# Patient Record
Sex: FEMALE | Race: WHITE | NOT HISPANIC OR LATINO | Employment: OTHER | URBAN - METROPOLITAN AREA
[De-identification: names, ages, dates, MRNs, and addresses within clinical notes are randomized per-mention and may not be internally consistent; named-entity substitution may affect disease eponyms.]

---

## 2017-01-23 ENCOUNTER — CONSULT (OUTPATIENT)
Dept: CARDIOLOGY | Facility: CLINIC | Age: 67
End: 2017-01-23

## 2017-01-23 VITALS
SYSTOLIC BLOOD PRESSURE: 134 MMHG | DIASTOLIC BLOOD PRESSURE: 88 MMHG | BODY MASS INDEX: 36.67 KG/M2 | HEIGHT: 64 IN | HEART RATE: 112 BPM | WEIGHT: 214.8 LBS

## 2017-01-23 DIAGNOSIS — I48.19 PERSISTENT ATRIAL FIBRILLATION (HCC): Primary | ICD-10-CM

## 2017-01-23 PROCEDURE — 93000 ELECTROCARDIOGRAM COMPLETE: CPT | Performed by: INTERNAL MEDICINE

## 2017-01-23 PROCEDURE — 99204 OFFICE O/P NEW MOD 45 MIN: CPT | Performed by: INTERNAL MEDICINE

## 2017-01-23 NOTE — MR AVS SNAPSHOT
Chelsey Herrera   1/23/2017 9:00 AM   Consult    Dept Phone:  [phone]   Encounter #:  [id number]    Provider:  Miguelito Owusu DO   Department:  [facility] [place] CARDIOLOGY                Your Full Care Plan              Today's Medication Changes          These changes are accurate as of: 1/23/17 10:11 AM.  If you have any questions, ask your nurse or doctor.               Stop taking medication(s)listed here:     flecainide 100 MG tablet   Commonly known as:  TAMBOCOR   Stopped by:  Miguelito Owusu DO                      Your Updated Medication List          This list is accurate as of: 1/23/17 10:11 AM.  Always use your most recent med list.                apixaban 5 MG tablet tablet   Commonly known as:  ELIQUIS       B-12 (METHYLCOBALAMIN) SL       cholecalciferol 1000 UNITS tablet   Commonly known as:  VITAMIN D3       cyclobenzaprine 10 MG tablet   Commonly known as:  FLEXERIL       ferrous sulfate 325 (65 FE) MG tablet       furosemide 40 MG tablet   Commonly known as:  LASIX       HYDROcodone-acetaminophen  MG per tablet   Commonly known as:  NORCO       MULTI VITAMIN PO       nebivolol 10 MG tablet   Commonly known as:  BYSTOLIC   Take 1 tablet by mouth 2 (Two) Times a Day.       potassium chloride 20 MEQ CR tablet   Commonly known as:  K-DUR,KLOR-CON               You Were Diagnosed With        Codes Comments    Persistent atrial fibrillation    -  Primary ICD-10-CM: I48.1  ICD-9-CM: 427.31       Instructions     None    Patient Instructions History      Upcoming Appointments     Visit Type Date Time Department    CONSULT 1/23/2017  9:00 AM [facility]      MyChart Signup     Our records indicate that you have declined [facility] MyChart signup. If you would like to sign up for [employer], please email [email or web address] or call [phone] to obtain an activation code.             Other Info from Your Visit            "  Allergies     Accupril [Quinapril Hcl]  Cough      Vital Signs     Blood Pressure Pulse Height Weight Last Menstrual Period Body Mass Index    134/88 (BP Location: Left arm, Patient Position: Sitting) 112 64\" (162.6 cm) 214 lb 12.8 oz (97.4 kg) (LMP Unknown) 36.87 kg/m2    Smoking Status                   Never Smoker           Problems and Diagnoses Noted     Atrial fibrillation (irregular heartbeat)        "

## 2017-01-23 NOTE — PROGRESS NOTES
Chelsey Herrera  1950  [phone]      01/23/2017    [facility] CARDIOLOGY    Everardo Mccormack MD  [address] [place] [id number]    REFERRING DOCTOR : Jeremiah Sinclair MD         Patient ID: Chelsey Herrera is a 66 y.o. female.    Chief Complaint: afib w/ RVR       Allergies   Allergen Reactions   • Accupril [Quinapril Hcl] Cough       Current Outpatient Prescriptions:   •  apixaban (ELIQUIS) 5 MG tablet tablet, Take 5 mg by mouth 2 (Two) Times a Day., Disp: , Rfl:   •  B-12, METHYLCOBALAMIN, SL, Place 2,500 mg under the tongue Daily., Disp: , Rfl:   •  cholecalciferol (VITAMIN D3) 1000 UNITS tablet, Take 1,000 Units by mouth Daily., Disp: , Rfl:   •  cyclobenzaprine (FLEXERIL) 10 MG tablet, Take 10 mg by mouth At Night As Needed for muscle spasms., Disp: , Rfl:   •  ferrous sulfate 325 (65 FE) MG tablet, Take 325 mg by mouth Daily With Breakfast., Disp: , Rfl:   •  furosemide (LASIX) 40 MG tablet, Take 40 mg by mouth Daily As Needed., Disp: , Rfl:   •  HYDROcodone-acetaminophen (NORCO)  MG per tablet, Take 1 tablet by mouth Every 6 (Six) Hours As Needed for moderate pain (4-6)., Disp: , Rfl:   •  Multiple Vitamin (MULTI VITAMIN PO), Take 1 tablet by mouth Daily., Disp: , Rfl:   •  nebivolol (BYSTOLIC) 10 MG tablet, Take 1 tablet by mouth 2 (Two) Times a Day., Disp: 30 tablet, Rfl: 12  •  potassium chloride (K-DUR,KLOR-CON) 20 MEQ CR tablet, Take 20 mEq by mouth Daily., Disp: , Rfl:     History of Present Illness     Patient presents today for consultation referred by Migel Sinclair MD regarding atrial fibrillation and failed medications. She was diagnosed about one year ago by PCP when she went in for extreme fatigue. Her main symptoms are fatigue, SOA, and palpitations. She was initially tried on Sotalol with ECV and only maintained sinus for a few minutes and a second cardioversion couple weeks later which then resulted in normal rhythm however  lasted only about 24 hours.. She then tried Flecainide with subsequent ECV and maintained sinus for a  She tells me that she had TTE which was reportedly normal and after talking to Dr Sherwood office EF 65%. She has had normal stress test. No issues with CP, PND, orthopnea, dizziness, syncope.     The following portions of the patient's history were reviewed and updated as appropriate: allergies, current medications, past family history, past medical history, past social history, past surgical history and problem list.    Past Medical History   Diagnosis Date   • Arrhythmia    • Arthritis    • Atrial fibrillation    • Bilateral kidney stones    • Cough    • Hypertension    • Muscle weakness (generalized)    • Palpitations    • SOB (shortness of breath)    • Urination frequency          Past Surgical History   Procedure Laterality Date   • Replacement total knee bilateral     • Carpal tunnel release     • Sinus surgery     • Bariatric surgery     • Cardioversion     • Cardiac catheterization         Social History     Social History   • Marital status:      Spouse name: N/A   • Number of children: N/A   • Years of education: N/A     Occupational History   • Not on file.     Social History Main Topics   • Smoking status: Never Smoker   • Smokeless tobacco: Never Used   • Alcohol use No   • Drug use: No   • Sexual activity: Defer     Other Topics Concern   • Not on file     Social History Narrative     Family History   Problem Relation Age of Onset   • Heart attack Mother    • Heart attack Father    • Heart disease Brother    • Diabetes Brother    • Kidney disease Brother          REVIEW OF SYSTEMS:   CONSTITUTIONAL: No weight loss, fever, chills. + fatigue  HEENT: Eyes: No visual loss, blurred vision, double vision or yellow sclerae. Ears, Nose, Throat: No hearing loss, sneezing, congestion, runny nose or sore throat.   SKIN: No rash or itching.     RESPIRATORY: No hemoptysis, cough or sputum. + SOB  "noted.  GASTROINTESTINAL: No anorexia, nausea, vomiting or diarrhea. No abdominal pain, bright red blood per rectum or melena.  GENITOURINARY: No burning on urination, hematuria or increased frequency.  NEUROLOGICAL: No headache, dizziness, syncope, paralysis, ataxia, numbness or tingling in the extremities. No change in bowel or bladder control.   MUSCULOSKELETAL: No muscle, back pain, joint pain or stiffness.   HEMATOLOGIC: No anemia, bleeding or bruising.   LYMPHATICS: No enlarged nodes. No history of splenectomy.   PSYCHIATRIC: No history of depression, anxiety, hallucinations.   ENDOCRINOLOGIC: No reports of sweating, cold or heat intolerance. No polyuria or polydipsia.   Ext: No edema or bruising      The patient's old records including ambulatory rhythm recordings (ECGs, Holter/event monitor) were reviewed and discussed.           Objective:       Vitals:    01/23/17 0917   BP: 134/88   BP Location: Left arm   Patient Position: Sitting   Pulse: 112   Weight: 214 lb 12.8 oz (97.4 kg)   Height: 64\" (162.6 cm)       Constitutional: oriented to person, place, and time.  well-developed and well-nourished. No distress.   HENT: Normocephalic.   Eyes: Conjunctivae are normal. No scleral icterus.   Neck: Normal carotid pulses, no hepatojugular reflux and no JVD present. Carotid bruit is not present. No tracheal deviation, no edema and no erythema present. No thyromegaly present.   Cardiovascular: Irregularly irregular tachy, S1 normal, S2 normal, normal heart sounds and intact distal pulses.   No extrasystoles are present. PMI is not displaced.  Exam reveals no gallop, no distant heart sounds and no friction rub.    No murmur heard.  Pulses:       Radial pulses are 2+ on the right side, and 2+ on the left side.       Dorsalis pedis pulses are 2+ on the right side, and 2+ on the left side.   Pulmonary/Chest: Effort normal and breath sounds normal. No respiratory distress. She has no decreased breath sounds.  no " wheezes,  Rhonchi or rales.  no tenderness.   Abdominal: Soft. Bowel sounds are normal. She exhibits no distension and no mass. There is no hepatosplenomegaly. There is no tenderness. There is no rebound and no guarding.   Musculoskeletal:  Trace edema, tenderness or deformity.   Neurological: is alert and oriented to person, place, and time.   Skin: Skin is warm and dry. No rash noted. No diaphoretic. No cyanosis or erythema. No pallor. Nails show no clubbing.   Psychiatric: Normal mood and affect.Speech is normal and behavior is normal.    Lab Review:   Results for orders placed or performed during the hospital encounter of 11/29/16   CBC (No Diff)   Result Value Ref Range    WBC 9.41 3.50 - 10.80 10*3/mm3    RBC 4.98 3.89 - 5.14 10*6/mm3    Hemoglobin 13.9 11.5 - 15.5 g/dL    Hematocrit 43.4 34.5 - 44.0 %    MCV 87.1 80.0 - 99.0 fL    MCH 27.9 27.0 - 31.0 pg    MCHC 32.0 32.0 - 36.0 g/dL    RDW 15.0 (H) 11.3 - 14.5 %    RDW-SD 48.0 37.0 - 54.0 fl    MPV 10.5 6.0 - 12.0 fL    Platelets 277 150 - 450 10*3/mm3   Basic Metabolic Panel   Result Value Ref Range    Glucose 109 (H) 70 - 100 mg/dL    BUN 20 9 - 23 mg/dL    Creatinine 0.80 0.60 - 1.30 mg/dL    Sodium 141 132 - 146 mmol/L    Potassium 4.5 3.5 - 5.5 mmol/L    Chloride 103 99 - 109 mmol/L    CO2 30.0 20.0 - 31.0 mmol/L    Calcium 10.4 8.7 - 10.4 mg/dL    eGFR Non African Amer 71 >60 mL/min/1.73    BUN/Creatinine Ratio 25.0 7.0 - 25.0    Anion Gap 8.0 3.0 - 11.0 mmol/L         ECG 12 Lead  Date/Time: 1/23/2017 10:19 AM  Performed by: NOY MIJARES  Authorized by: NOY MIJARES   Rhythm: atrial fibrillation  Comments: Afib with RVR                Diagnosis:   1. Persistent atrial fibrillation      Assessment & Plan:     1. Symptomatic Persistent Atrial Fibrillation w/ failure of Sotalol, Flecainide, and ECV x 3 with Early Recurrence of Afib. CHADS VASC 2 with age and female. Will plan for PVA in near future per patients preference since she kareen failed to  drugs and continues to feels very symptomatic with the atrial fibrillation with shortness of breath and fatigue like she's never had. CHADS-VASC=2, on Eliquis 5mg BID. Currently on Bystolic 10mg BID. Will add Cardizem 120mg daily for better rate control and will send a EKG next week to look at rates. The risks, benefits, and alternatives of the procedure have been reviewed and the patient wishes to proceed. Normal EF and negative stress for ischemia in the past.         Scribed for Miguelito Owusu DO by Merline Nieto PA-C. 1/23/2017  9:49 AM    I, Miguelito Owusu DO, personally performed the services described in this documentation as scribed by the above named individual in my presence, and it is both accurate and complete.  1/23/2017  10:22 AM    Miguelito Owusu DO  10:22 AM  01/23/17        CC: MD Merline Bazzi PA  01/23/17  9:49 AM      EMR Dragon/Transcription disclaimer:  Much of this encounter note is an electronic transcription/translation of spoken language to printed text. Electronic translation of spoken language may permit erroneous, or at times, nonsensical words or phrases to be inadvertently transcribed. Although I have reviewed the note for such errors, some may still exist.

## 2017-01-30 DIAGNOSIS — I48.19 PERSISTENT ATRIAL FIBRILLATION (HCC): Primary | ICD-10-CM

## 2017-02-09 DIAGNOSIS — I48.19 PERSISTENT ATRIAL FIBRILLATION (HCC): Primary | ICD-10-CM

## 2017-02-09 RX ORDER — ACETAMINOPHEN 325 MG/1
650 TABLET ORAL EVERY 4 HOURS PRN
Status: CANCELLED | OUTPATIENT
Start: 2017-02-09

## 2017-02-09 RX ORDER — SODIUM CHLORIDE 0.9 % (FLUSH) 0.9 %
1-10 SYRINGE (ML) INJECTION AS NEEDED
Status: CANCELLED | OUTPATIENT
Start: 2017-02-09

## 2017-02-09 RX ORDER — ONDANSETRON 2 MG/ML
4 INJECTION INTRAMUSCULAR; INTRAVENOUS EVERY 6 HOURS PRN
Status: CANCELLED | OUTPATIENT
Start: 2017-02-09

## 2017-02-17 PROBLEM — M79.7 FIBROMYALGIA: Status: ACTIVE | Noted: 2017-02-17

## 2017-02-17 PROBLEM — N20.0 NEPHROLITHIASIS: Status: ACTIVE | Noted: 2017-02-17

## 2017-02-17 PROBLEM — Z98.890 HISTORY OF SURGERY: Status: ACTIVE | Noted: 2017-02-17

## 2017-02-17 PROBLEM — E66.812 OBESITY, CLASS II, BMI 35-39.9: Status: ACTIVE | Noted: 2017-02-17

## 2017-02-17 PROBLEM — E66.9 OBESITY, CLASS II, BMI 35-39.9: Status: ACTIVE | Noted: 2017-02-17

## 2017-02-17 PROBLEM — I10 HYPERTENSION: Status: ACTIVE | Noted: 2017-02-17

## 2017-02-17 NOTE — NURSING NOTE
"PVA SCREENING TOOL  Chelsey Herrera 1950   [address] [address]   [phone] (home)     Referral Source: Sinclair  Information obtained from: [x] Medical record review  [x] Patient report  Scheduled for: PVA on 2/22/17 with Dr Owusu     AFib Specific History:  AFib Type: persistent  [id number] Score: 3 Contributing Factors: HTN, AGE >65 and Female   Anticoagulation: Eliquis  Cardioversion x 2  Failed AAD(s): sotalol, flecainide  Prior Ablation: None     Is Ms. Herrera aware of her AFib? Yes   Onset: Nov 2015  Exacerbations:    Frequency:  persistent Alleviations:     Duration:      Symptoms:   [x] Palpitations:    [] Chest Discomfort:    [x] Dizziness: occasional [] Presyncope:    [] Lightheadedness:   [] Syncope:    [x] Fatigue:    [] Other: diaphoresis    [x] Short of Breath:     Last Echo(s):  [x] TTE Date: 10/20/16        [] BRETT Date: 11/10/16       EF: 65%     EF: 36-40%        LA: 3.97cm    LA: \"normal size\"        VHD? Mild TR   VHD? Mild MR & TR     Past medical History:   [] Diabetes - NO    [] HYPOthyroid  [] HYPERthyroid - NO       No results found for: HGBA1C        No results found for: TSH    [x] HTN        [x] Tx? Bystolic, diltazem, lasix prn       [x] Controlled? Yes    [] Heart Failure - NO   [] CVA - NO                        [] TIA - NO        [] Ischemic        [] Hemorrhagic         [] Nonischemic         [] Embolic        [] Diastolic    [] CAD - NO       [] MI - NO        [] Dyslipidemia - NO    [x] Ischemic Evaluation       [x] Stress Test: [facility] 2/12/15: EF 63%, normal perfusion & wall motion       [] Heart Cath:     [] Sleep Apnea Diagnosed - NO     [] Sleep Apnea Suspected - NO     [x] Obesity - gastric bypass 2005 w/ subsequent weight gain       [] BMI 30-35        [x] BMI >35 (36.9)        [x] Weight reduction discussed with Ms. Herrera         [] Nutrition Consult            [] Declined by Ms. Herrera     Other Pertinent PMH:    Social / Lifestyle History:   []   " Tobacco: NEVER     []   Alcohol: No    [x]   Caffeine: 2 cups per day   []   Recreational Drugs: Denies   []   Stress Issues:    []   Exercise: none at present due to SOA from persistent AFib    Summary of Patient Contact:  I spoke with Ms. Herrera about her upcoming PVA.  She was well informed about the procedure from prior discussion with her physician and from reading the provided literature.  I answered a few remaining questions. Ms. Herrera verbalized understanding and she is ready to proceed.      REG Leary, RN 02/17/17 14:10

## 2017-02-21 ENCOUNTER — HOSPITAL ENCOUNTER (OUTPATIENT)
Dept: CT IMAGING | Facility: HOSPITAL | Age: 67
Discharge: HOME | End: 2017-02-21
Attending: INTERNAL MEDICINE | Admitting: INTERNAL MEDICINE

## 2017-02-21 ENCOUNTER — APPOINTMENT (OUTPATIENT)
Dept: PREADMISSION TESTING | Facility: HOSPITAL | Age: 67
End: 2017-02-21

## 2017-02-21 DIAGNOSIS — Z79.899 OTHER LONG TERM (CURRENT) DRUG THERAPY: ICD-10-CM

## 2017-02-21 DIAGNOSIS — R79.89 ABNORMAL TSH: ICD-10-CM

## 2017-02-21 DIAGNOSIS — I48.19 PERSISTENT ATRIAL FIBRILLATION (HCC): ICD-10-CM

## 2017-02-21 DIAGNOSIS — Z13.1 DM (DIABETES MELLITUS SCREEN): Primary | ICD-10-CM

## 2017-02-21 DIAGNOSIS — R94.6 ABNORMAL RESULTS OF THYROID FUNCTION STUDIES: ICD-10-CM

## 2017-02-21 LAB
ALBUMIN SERPL-MCNC: 4.6 G/DL (ref 3.2–4.8)
ALBUMIN/GLOB SERPL: 1.6 G/DL (ref 1.5–2.5)
ALP SERPL-CCNC: 121 U/L (ref 25–100)
ALT SERPL W P-5'-P-CCNC: 13 U/L (ref 7–40)
ANION GAP SERPL CALCULATED.3IONS-SCNC: 7 MMOL/L (ref 3–11)
AST SERPL-CCNC: 22 U/L (ref 0–33)
BILIRUB SERPL-MCNC: 0.9 MG/DL (ref 0.3–1.2)
BUN BLD-MCNC: 17 MG/DL (ref 9–23)
BUN/CREAT SERPL: 21.3 (ref 7–25)
CALCIUM SPEC-SCNC: 9.8 MG/DL (ref 8.7–10.4)
CHLORIDE SERPL-SCNC: 105 MMOL/L (ref 99–109)
CO2 SERPL-SCNC: 27 MMOL/L (ref 20–31)
CREAT BLD-MCNC: 0.8 MG/DL (ref 0.6–1.3)
DEPRECATED RDW RBC AUTO: 47.2 FL (ref 37–54)
ERYTHROCYTE [DISTWIDTH] IN BLOOD BY AUTOMATED COUNT: 14.6 % (ref 11.3–14.5)
GFR SERPL CREATININE-BSD FRML MDRD: 71 ML/MIN/1.73
GLOBULIN UR ELPH-MCNC: 2.8 GM/DL
GLUCOSE BLD-MCNC: 112 MG/DL (ref 70–100)
HBA1C MFR BLD: 6.1 % (ref 4.8–5.6)
HCT VFR BLD AUTO: 40.2 % (ref 34.5–44)
HGB BLD-MCNC: 13.7 G/DL (ref 11.5–15.5)
INR PPP: 1.01
MAGNESIUM SERPL-MCNC: 2.2 MG/DL (ref 1.3–2.7)
MCH RBC QN AUTO: 30 PG (ref 27–31)
MCHC RBC AUTO-ENTMCNC: 34.1 G/DL (ref 32–36)
MCV RBC AUTO: 88 FL (ref 80–99)
PLATELET # BLD AUTO: 282 10*3/MM3 (ref 150–450)
PMV BLD AUTO: 10.2 FL (ref 6–12)
POTASSIUM BLD-SCNC: 4.6 MMOL/L (ref 3.5–5.5)
PROT SERPL-MCNC: 7.4 G/DL (ref 5.7–8.2)
PROTHROMBIN TIME: 11 SECONDS (ref 9.6–11.5)
RBC # BLD AUTO: 4.57 10*6/MM3 (ref 3.89–5.14)
SODIUM BLD-SCNC: 139 MMOL/L (ref 132–146)
T4 FREE SERPL-MCNC: 0.83 NG/DL (ref 0.89–1.76)
TSH SERPL DL<=0.05 MIU/L-ACNC: 6.87 MIU/ML (ref 0.35–5.35)
WBC NRBC COR # BLD: 9.7 10*3/MM3 (ref 3.5–10.8)

## 2017-02-21 PROCEDURE — 85610 PROTHROMBIN TIME: CPT | Performed by: PHYSICIAN ASSISTANT

## 2017-02-21 PROCEDURE — 36415 COLL VENOUS BLD VENIPUNCTURE: CPT

## 2017-02-21 PROCEDURE — 83036 HEMOGLOBIN GLYCOSYLATED A1C: CPT | Performed by: INTERNAL MEDICINE

## 2017-02-21 PROCEDURE — 84439 ASSAY OF FREE THYROXINE: CPT | Performed by: PHYSICIAN ASSISTANT

## 2017-02-21 PROCEDURE — 0 IOPAMIDOL PER 1 ML: Performed by: INTERNAL MEDICINE

## 2017-02-21 PROCEDURE — 85027 COMPLETE CBC AUTOMATED: CPT | Performed by: PHYSICIAN ASSISTANT

## 2017-02-21 PROCEDURE — 84443 ASSAY THYROID STIM HORMONE: CPT | Performed by: PHYSICIAN ASSISTANT

## 2017-02-21 PROCEDURE — 83735 ASSAY OF MAGNESIUM: CPT | Performed by: PHYSICIAN ASSISTANT

## 2017-02-21 PROCEDURE — 71275 CT ANGIOGRAPHY CHEST: CPT

## 2017-02-21 PROCEDURE — 80053 COMPREHEN METABOLIC PANEL: CPT | Performed by: PHYSICIAN ASSISTANT

## 2017-02-21 RX ORDER — HYDROCODONE BITARTRATE AND ACETAMINOPHEN 7.5; 325 MG/1; MG/1
1 TABLET ORAL EVERY 6 HOURS PRN
COMMUNITY

## 2017-02-21 RX ORDER — DILTIAZEM HYDROCHLORIDE 120 MG/1
120 CAPSULE, COATED, EXTENDED RELEASE ORAL DAILY
COMMUNITY
End: 2017-02-23 | Stop reason: HOSPADM

## 2017-02-21 RX ADMIN — IOPAMIDOL 80 ML: 755 INJECTION, SOLUTION INTRAVENOUS at 13:20

## 2017-02-21 NOTE — DISCHARGE INSTRUCTIONS
The following instructions given during Pre Admission Testing visit:    Do not eat or drink anything after MN except for sips of water with your a.m. Prescription meds unless otherwise instructed by your physician.    Glasses and jewelry may be worn, but dentures must be removed prior to cath/procedure.    Leave any items you consider valuable at home.    Family members may wait in [facility] waiting area during procedure.    Bring all medications in their original containers the day of procedure.    Bring photo ID and insurance cards on the day of procedure.    Need to make arrangements for transportation prior to discharge.    The following handouts were given:     Heart Cath pathway (if applicable)   Cardiac Cath booklet published by [facility]    OR appropriate [facility] procedure booklet    If applicable, pt instructed to bring CPAP mask and tubing the day of procedure.  E.P. Book given

## 2017-02-22 ENCOUNTER — ANESTHESIA EVENT (OUTPATIENT)
Dept: CARDIOLOGY | Facility: HOSPITAL | Age: 67
End: 2017-02-22

## 2017-02-22 ENCOUNTER — ANESTHESIA (OUTPATIENT)
Dept: CARDIOLOGY | Facility: HOSPITAL | Age: 67
End: 2017-02-22

## 2017-02-22 ENCOUNTER — HOSPITAL ENCOUNTER (OUTPATIENT)
Facility: HOSPITAL | Age: 67
Discharge: HOME | End: 2017-02-23
Attending: INTERNAL MEDICINE | Admitting: INTERNAL MEDICINE

## 2017-02-22 ENCOUNTER — HOSPITAL ENCOUNTER (OUTPATIENT)
Dept: CARDIOLOGY | Facility: HOSPITAL | Age: 67
Setting detail: HOSPITAL OUTPATIENT SURGERY
Discharge: HOME | End: 2017-02-22
Attending: INTERNAL MEDICINE

## 2017-02-22 VITALS — SYSTOLIC BLOOD PRESSURE: 114 MMHG | HEART RATE: 129 BPM | DIASTOLIC BLOOD PRESSURE: 86 MMHG | OXYGEN SATURATION: 97 %

## 2017-02-22 DIAGNOSIS — I48.19 PERSISTENT ATRIAL FIBRILLATION (HCC): ICD-10-CM

## 2017-02-22 LAB
ACT BLD: 157 SECONDS (ref 82–152)
ACT BLD: 337 SECONDS (ref 82–152)
ACT BLD: 348 SECONDS (ref 82–152)
ACT BLD: 353 SECONDS (ref 82–152)
ACT BLD: 358 SECONDS (ref 82–152)
ACT BLD: 368 SECONDS (ref 82–152)
ACT BLD: 379 SECONDS (ref 82–152)
LV EF 2D ECHO EST: 55 %

## 2017-02-22 PROCEDURE — 85347 COAGULATION TIME ACTIVATED: CPT

## 2017-02-22 PROCEDURE — C1894 INTRO/SHEATH, NON-LASER: HCPCS | Performed by: INTERNAL MEDICINE

## 2017-02-22 PROCEDURE — 93613 INTRACARDIAC EPHYS 3D MAPG: CPT | Performed by: INTERNAL MEDICINE

## 2017-02-22 PROCEDURE — C1759 CATH, INTRA ECHOCARDIOGRAPHY: HCPCS | Performed by: INTERNAL MEDICINE

## 2017-02-22 PROCEDURE — 93320 DOPPLER ECHO COMPLETE: CPT | Performed by: INTERNAL MEDICINE

## 2017-02-22 PROCEDURE — 93623 PRGRMD STIMJ&PACG IV RX NFS: CPT | Performed by: INTERNAL MEDICINE

## 2017-02-22 PROCEDURE — C1769 GUIDE WIRE: HCPCS | Performed by: INTERNAL MEDICINE

## 2017-02-22 PROCEDURE — 93656 COMPRE EP EVAL ABLTJ ATR FIB: CPT | Performed by: INTERNAL MEDICINE

## 2017-02-22 PROCEDURE — G0378 HOSPITAL OBSERVATION PER HR: HCPCS

## 2017-02-22 PROCEDURE — C1732 CATH, EP, DIAG/ABL, 3D/VECT: HCPCS | Performed by: INTERNAL MEDICINE

## 2017-02-22 PROCEDURE — 93325 DOPPLER ECHO COLOR FLOW MAPG: CPT | Performed by: INTERNAL MEDICINE

## 2017-02-22 PROCEDURE — C1893 INTRO/SHEATH, FIXED,NON-PEEL: HCPCS | Performed by: INTERNAL MEDICINE

## 2017-02-22 PROCEDURE — 93662 INTRACARDIAC ECG (ICE): CPT | Performed by: INTERNAL MEDICINE

## 2017-02-22 PROCEDURE — 25010000002 FUROSEMIDE PER 20 MG: Performed by: INTERNAL MEDICINE

## 2017-02-22 PROCEDURE — 25010000002 ONDANSETRON PER 1 MG: Performed by: PHYSICIAN ASSISTANT

## 2017-02-22 PROCEDURE — 25010000002 PROTAMINE SULFATE PER 10 MG: Performed by: INTERNAL MEDICINE

## 2017-02-22 PROCEDURE — 25010000002 KETOROLAC TROMETHAMINE PER 15 MG: Performed by: INTERNAL MEDICINE

## 2017-02-22 PROCEDURE — 93325 DOPPLER ECHO COLOR FLOW MAPG: CPT

## 2017-02-22 PROCEDURE — 93312 ECHO TRANSESOPHAGEAL: CPT | Performed by: INTERNAL MEDICINE

## 2017-02-22 PROCEDURE — 25010000002 PROPOFOL 10 MG/ML EMULSION: Performed by: NURSE ANESTHETIST, CERTIFIED REGISTERED

## 2017-02-22 PROCEDURE — 93312 ECHO TRANSESOPHAGEAL: CPT

## 2017-02-22 PROCEDURE — 25010000002 HEPARIN (PORCINE) PER 1000 UNITS: Performed by: INTERNAL MEDICINE

## 2017-02-22 PROCEDURE — 93321 DOPPLER ECHO F-UP/LMTD STD: CPT

## 2017-02-22 PROCEDURE — 25010000002 PHENYLEPHRINE PER 1 ML: Performed by: NURSE ANESTHETIST, CERTIFIED REGISTERED

## 2017-02-22 PROCEDURE — 25010000002 MIDAZOLAM PER 1 MG: Performed by: INTERNAL MEDICINE

## 2017-02-22 PROCEDURE — 25010000002 DEXAMETHASONE PER 1 MG: Performed by: NURSE ANESTHETIST, CERTIFIED REGISTERED

## 2017-02-22 PROCEDURE — C1730 CATH, EP, 19 OR FEW ELECT: HCPCS | Performed by: INTERNAL MEDICINE

## 2017-02-22 PROCEDURE — 25010000002 FENTANYL CITRATE (PF) 100 MCG/2ML SOLUTION: Performed by: NURSE ANESTHETIST, CERTIFIED REGISTERED

## 2017-02-22 RX ORDER — PROMETHAZINE HYDROCHLORIDE 25 MG/ML
6.25 INJECTION, SOLUTION INTRAMUSCULAR; INTRAVENOUS ONCE AS NEEDED
Status: DISCONTINUED | OUTPATIENT
Start: 2017-02-22 | End: 2017-02-23 | Stop reason: HOSPADM

## 2017-02-22 RX ORDER — ATRACURIUM BESYLATE 10 MG/ML
INJECTION, SOLUTION INTRAVENOUS AS NEEDED
Status: DISCONTINUED | OUTPATIENT
Start: 2017-02-22 | End: 2017-02-22 | Stop reason: SURG

## 2017-02-22 RX ORDER — OXYCODONE HYDROCHLORIDE AND ACETAMINOPHEN 5; 325 MG/1; MG/1
1 TABLET ORAL EVERY 4 HOURS PRN
Status: DISCONTINUED | OUTPATIENT
Start: 2017-02-22 | End: 2017-02-23 | Stop reason: HOSPADM

## 2017-02-22 RX ORDER — LABETALOL HYDROCHLORIDE 5 MG/ML
5 INJECTION, SOLUTION INTRAVENOUS
Status: DISCONTINUED | OUTPATIENT
Start: 2017-02-22 | End: 2017-02-23 | Stop reason: HOSPADM

## 2017-02-22 RX ORDER — SODIUM CHLORIDE, SODIUM LACTATE, POTASSIUM CHLORIDE, CALCIUM CHLORIDE 600; 310; 30; 20 MG/100ML; MG/100ML; MG/100ML; MG/100ML
100 INJECTION, SOLUTION INTRAVENOUS CONTINUOUS
Status: DISCONTINUED | OUTPATIENT
Start: 2017-02-22 | End: 2017-02-23 | Stop reason: HOSPADM

## 2017-02-22 RX ORDER — FENTANYL CITRATE 50 UG/ML
INJECTION, SOLUTION INTRAMUSCULAR; INTRAVENOUS AS NEEDED
Status: DISCONTINUED | OUTPATIENT
Start: 2017-02-22 | End: 2017-02-22 | Stop reason: SURG

## 2017-02-22 RX ORDER — LIDOCAINE HYDROCHLORIDE 10 MG/ML
INJECTION, SOLUTION INFILTRATION; PERINEURAL AS NEEDED
Status: DISCONTINUED | OUTPATIENT
Start: 2017-02-22 | End: 2017-02-22 | Stop reason: HOSPADM

## 2017-02-22 RX ORDER — PROTAMINE SULFATE 10 MG/ML
INJECTION, SOLUTION INTRAVENOUS AS NEEDED
Status: DISCONTINUED | OUTPATIENT
Start: 2017-02-22 | End: 2017-02-22 | Stop reason: HOSPADM

## 2017-02-22 RX ORDER — HYDROMORPHONE HYDROCHLORIDE 1 MG/ML
0.5 INJECTION, SOLUTION INTRAMUSCULAR; INTRAVENOUS; SUBCUTANEOUS
Status: DISCONTINUED | OUTPATIENT
Start: 2017-02-22 | End: 2017-02-23 | Stop reason: HOSPADM

## 2017-02-22 RX ORDER — FENTANYL CITRATE 50 UG/ML
50 INJECTION, SOLUTION INTRAMUSCULAR; INTRAVENOUS
Status: DISCONTINUED | OUTPATIENT
Start: 2017-02-22 | End: 2017-02-22

## 2017-02-22 RX ORDER — CYCLOBENZAPRINE HCL 10 MG
5 TABLET ORAL ONCE
Status: COMPLETED | OUTPATIENT
Start: 2017-02-22 | End: 2017-02-22

## 2017-02-22 RX ORDER — FUROSEMIDE 40 MG/1
40 TABLET ORAL DAILY PRN
Status: DISCONTINUED | OUTPATIENT
Start: 2017-02-22 | End: 2017-02-23 | Stop reason: HOSPADM

## 2017-02-22 RX ORDER — FUROSEMIDE 10 MG/ML
INJECTION INTRAMUSCULAR; INTRAVENOUS AS NEEDED
Status: DISCONTINUED | OUTPATIENT
Start: 2017-02-22 | End: 2017-02-22 | Stop reason: HOSPADM

## 2017-02-22 RX ORDER — SODIUM CHLORIDE, SODIUM LACTATE, POTASSIUM CHLORIDE, CALCIUM CHLORIDE 600; 310; 30; 20 MG/100ML; MG/100ML; MG/100ML; MG/100ML
INJECTION, SOLUTION INTRAVENOUS CONTINUOUS PRN
Status: DISCONTINUED | OUTPATIENT
Start: 2017-02-22 | End: 2017-02-22 | Stop reason: SURG

## 2017-02-22 RX ORDER — FENTANYL CITRATE 50 UG/ML
INJECTION, SOLUTION INTRAMUSCULAR; INTRAVENOUS
Status: DISCONTINUED
Start: 2017-02-22 | End: 2017-02-22 | Stop reason: WASHOUT

## 2017-02-22 RX ORDER — PANTOPRAZOLE SODIUM 40 MG/1
40 TABLET, DELAYED RELEASE ORAL
Status: DISCONTINUED | OUTPATIENT
Start: 2017-02-23 | End: 2017-02-23 | Stop reason: HOSPADM

## 2017-02-22 RX ORDER — PROMETHAZINE HYDROCHLORIDE 25 MG/1
25 TABLET ORAL ONCE AS NEEDED
Status: DISCONTINUED | OUTPATIENT
Start: 2017-02-22 | End: 2017-02-23 | Stop reason: HOSPADM

## 2017-02-22 RX ORDER — SODIUM CHLORIDE 0.9 % (FLUSH) 0.9 %
1-10 SYRINGE (ML) INJECTION AS NEEDED
Status: DISCONTINUED | OUTPATIENT
Start: 2017-02-22 | End: 2017-02-23 | Stop reason: HOSPADM

## 2017-02-22 RX ORDER — MEPERIDINE HYDROCHLORIDE 25 MG/ML
12.5 INJECTION INTRAMUSCULAR; INTRAVENOUS; SUBCUTANEOUS
Status: DISCONTINUED | OUTPATIENT
Start: 2017-02-22 | End: 2017-02-22

## 2017-02-22 RX ORDER — ACETAMINOPHEN 325 MG/1
650 TABLET ORAL EVERY 4 HOURS PRN
Status: DISCONTINUED | OUTPATIENT
Start: 2017-02-22 | End: 2017-02-22

## 2017-02-22 RX ORDER — ONDANSETRON 2 MG/ML
4 INJECTION INTRAMUSCULAR; INTRAVENOUS EVERY 6 HOURS PRN
Status: DISCONTINUED | OUTPATIENT
Start: 2017-02-22 | End: 2017-02-22

## 2017-02-22 RX ORDER — SODIUM CHLORIDE 0.9 % (FLUSH) 0.9 %
1-10 SYRINGE (ML) INJECTION AS NEEDED
Status: DISCONTINUED | OUTPATIENT
Start: 2017-02-22 | End: 2017-02-22

## 2017-02-22 RX ORDER — NEBIVOLOL 10 MG/1
10 TABLET ORAL
Status: DISCONTINUED | OUTPATIENT
Start: 2017-02-23 | End: 2017-02-23 | Stop reason: HOSPADM

## 2017-02-22 RX ORDER — FLUMAZENIL 0.1 MG/ML
INJECTION INTRAVENOUS
Status: DISCONTINUED
Start: 2017-02-22 | End: 2017-02-22 | Stop reason: WASHOUT

## 2017-02-22 RX ORDER — FLECAINIDE ACETATE 50 MG/1
50 TABLET ORAL EVERY 12 HOURS SCHEDULED
Status: DISCONTINUED | OUTPATIENT
Start: 2017-02-22 | End: 2017-02-23 | Stop reason: HOSPADM

## 2017-02-22 RX ORDER — PROMETHAZINE HYDROCHLORIDE 25 MG/1
25 SUPPOSITORY RECTAL ONCE AS NEEDED
Status: DISCONTINUED | OUTPATIENT
Start: 2017-02-22 | End: 2017-02-23 | Stop reason: HOSPADM

## 2017-02-22 RX ORDER — ONDANSETRON 2 MG/ML
4 INJECTION INTRAMUSCULAR; INTRAVENOUS ONCE AS NEEDED
Status: DISCONTINUED | OUTPATIENT
Start: 2017-02-22 | End: 2017-02-23 | Stop reason: HOSPADM

## 2017-02-22 RX ORDER — DILTIAZEM HYDROCHLORIDE 120 MG/1
120 CAPSULE, COATED, EXTENDED RELEASE ORAL DAILY
Status: DISCONTINUED | OUTPATIENT
Start: 2017-02-22 | End: 2017-02-22

## 2017-02-22 RX ORDER — NALOXONE HYDROCHLORIDE 0.4 MG/ML
INJECTION, SOLUTION INTRAMUSCULAR; INTRAVENOUS; SUBCUTANEOUS
Status: DISCONTINUED
Start: 2017-02-22 | End: 2017-02-22 | Stop reason: WASHOUT

## 2017-02-22 RX ORDER — KETOROLAC TROMETHAMINE 15 MG/ML
15 INJECTION, SOLUTION INTRAMUSCULAR; INTRAVENOUS EVERY 12 HOURS
Status: COMPLETED | OUTPATIENT
Start: 2017-02-22 | End: 2017-02-23

## 2017-02-22 RX ORDER — SODIUM CHLORIDE 9 MG/ML
INJECTION, SOLUTION INTRAVENOUS CONTINUOUS PRN
Status: DISCONTINUED | OUTPATIENT
Start: 2017-02-22 | End: 2017-02-22 | Stop reason: HOSPADM

## 2017-02-22 RX ORDER — MIDAZOLAM HYDROCHLORIDE 1 MG/ML
INJECTION INTRAMUSCULAR; INTRAVENOUS
Status: COMPLETED | OUTPATIENT
Start: 2017-02-22 | End: 2017-02-22

## 2017-02-22 RX ORDER — LIDOCAINE HYDROCHLORIDE 10 MG/ML
INJECTION, SOLUTION INFILTRATION; PERINEURAL AS NEEDED
Status: DISCONTINUED | OUTPATIENT
Start: 2017-02-22 | End: 2017-02-22 | Stop reason: SURG

## 2017-02-22 RX ORDER — SUCRALFATE 1 G/1
1 TABLET ORAL
Status: DISCONTINUED | OUTPATIENT
Start: 2017-02-22 | End: 2017-02-23 | Stop reason: HOSPADM

## 2017-02-22 RX ORDER — ONDANSETRON 2 MG/ML
4 INJECTION INTRAMUSCULAR; INTRAVENOUS EVERY 6 HOURS PRN
Status: DISCONTINUED | OUTPATIENT
Start: 2017-02-22 | End: 2017-02-23 | Stop reason: HOSPADM

## 2017-02-22 RX ORDER — MIDAZOLAM HYDROCHLORIDE 1 MG/ML
INJECTION INTRAMUSCULAR; INTRAVENOUS
Status: DISCONTINUED
Start: 2017-02-22 | End: 2017-02-23 | Stop reason: HOSPADM

## 2017-02-22 RX ORDER — HEPARIN SODIUM 1000 [USP'U]/ML
INJECTION, SOLUTION INTRAVENOUS; SUBCUTANEOUS AS NEEDED
Status: DISCONTINUED | OUTPATIENT
Start: 2017-02-22 | End: 2017-02-22 | Stop reason: HOSPADM

## 2017-02-22 RX ORDER — DEXAMETHASONE SODIUM PHOSPHATE 4 MG/ML
INJECTION, SOLUTION INTRA-ARTICULAR; INTRALESIONAL; INTRAMUSCULAR; INTRAVENOUS; SOFT TISSUE AS NEEDED
Status: DISCONTINUED | OUTPATIENT
Start: 2017-02-22 | End: 2017-02-22 | Stop reason: SURG

## 2017-02-22 RX ORDER — PROPOFOL 10 MG/ML
VIAL (ML) INTRAVENOUS AS NEEDED
Status: DISCONTINUED | OUTPATIENT
Start: 2017-02-22 | End: 2017-02-22 | Stop reason: SURG

## 2017-02-22 RX ADMIN — DEXAMETHASONE SODIUM PHOSPHATE 8 MG: 4 INJECTION, SOLUTION INTRAMUSCULAR; INTRAVENOUS at 13:39

## 2017-02-22 RX ADMIN — MIDAZOLAM HYDROCHLORIDE 2 MG: 1 INJECTION, SOLUTION INTRAMUSCULAR; INTRAVENOUS at 13:10

## 2017-02-22 RX ADMIN — PHENYLEPHRINE HYDROCHLORIDE 1 MCG/KG/MIN: 10 INJECTION INTRAVENOUS at 13:50

## 2017-02-22 RX ADMIN — Medication 2 PATCH: at 23:00

## 2017-02-22 RX ADMIN — FLECAINIDE ACETATE 50 MG: 50 TABLET ORAL at 21:51

## 2017-02-22 RX ADMIN — APIXABAN 5 MG: 5 TABLET, FILM COATED ORAL at 21:51

## 2017-02-22 RX ADMIN — PHENYLEPHRINE HYDROCHLORIDE 1 MCG: 10 INJECTION INTRAVENOUS at 13:49

## 2017-02-22 RX ADMIN — PROPOFOL 120 MG: 10 INJECTION, EMULSION INTRAVENOUS at 13:39

## 2017-02-22 RX ADMIN — KETOROLAC TROMETHAMINE 15 MG: 15 INJECTION, SOLUTION INTRAMUSCULAR; INTRAVENOUS at 19:25

## 2017-02-22 RX ADMIN — MIDAZOLAM HYDROCHLORIDE 2 MG: 1 INJECTION, SOLUTION INTRAMUSCULAR; INTRAVENOUS at 13:12

## 2017-02-22 RX ADMIN — OXYCODONE AND ACETAMINOPHEN 1 TABLET: 5; 325 TABLET ORAL at 22:14

## 2017-02-22 RX ADMIN — LIDOCAINE HYDROCHLORIDE 50 MG: 10 INJECTION, SOLUTION INFILTRATION; PERINEURAL at 13:39

## 2017-02-22 RX ADMIN — SUCRALFATE 1 G: 1 TABLET ORAL at 21:51

## 2017-02-22 RX ADMIN — ONDANSETRON 4 MG: 2 INJECTION INTRAMUSCULAR; INTRAVENOUS at 17:23

## 2017-02-22 RX ADMIN — FENTANYL CITRATE 100 MCG: 50 INJECTION, SOLUTION INTRAMUSCULAR; INTRAVENOUS at 13:39

## 2017-02-22 RX ADMIN — CYCLOBENZAPRINE HYDROCHLORIDE 5 MG: 10 TABLET, FILM COATED ORAL at 23:14

## 2017-02-22 RX ADMIN — MIDAZOLAM HYDROCHLORIDE 2 MG: 1 INJECTION, SOLUTION INTRAMUSCULAR; INTRAVENOUS at 13:08

## 2017-02-22 RX ADMIN — SODIUM CHLORIDE, POTASSIUM CHLORIDE, SODIUM LACTATE AND CALCIUM CHLORIDE: 600; 310; 30; 20 INJECTION, SOLUTION INTRAVENOUS at 13:39

## 2017-02-22 RX ADMIN — ATRACURIUM BESYLATE 50 MG: 10 INJECTION, SOLUTION INTRAVENOUS at 13:39

## 2017-02-22 NOTE — H&P
[facility] CARDIOLOGY     Everardo Mccormack MD  [address] [address]     REFERRING DOCTOR : Jeremiah Sinclair MD         Subjective    Patient ID: Chelsey Herrera is a 66 y.o. female.     Chief Complaint: afib w/ RVR              Allergies   Allergen Reactions   • Accupril [Quinapril Hcl] Cough         Current Outpatient Prescriptions:   • apixaban (ELIQUIS) 5 MG tablet tablet, Take 5 mg by mouth 2 (Two) Times a Day., Disp: , Rfl:   • B-12, METHYLCOBALAMIN, SL, Place 2,500 mg under the tongue Daily., Disp: , Rfl:   • cholecalciferol (VITAMIN D3) 1000 UNITS tablet, Take 1,000 Units by mouth Daily., Disp: , Rfl:   • cyclobenzaprine (FLEXERIL) 10 MG tablet, Take 10 mg by mouth At Night As Needed for muscle spasms., Disp: , Rfl:   • ferrous sulfate 325 (65 FE) MG tablet, Take 325 mg by mouth Daily With Breakfast., Disp: , Rfl:   • furosemide (LASIX) 40 MG tablet, Take 40 mg by mouth Daily As Needed., Disp: , Rfl:   • HYDROcodone-acetaminophen (NORCO)  MG per tablet, Take 1 tablet by mouth Every 6 (Six) Hours As Needed for moderate pain (4-6)., Disp: , Rfl:   • Multiple Vitamin (MULTI VITAMIN PO), Take 1 tablet by mouth Daily., Disp: , Rfl:   • nebivolol (BYSTOLIC) 10 MG tablet, Take 1 tablet by mouth 2 (Two) Times a Day., Disp: 30 tablet, Rfl: 12  • potassium chloride (K-DUR,KLOR-CON) 20 MEQ CR tablet, Take 20 mEq by mouth Daily., Disp: , Rfl:      History of Present Illness      Patient presents today for consultation referred by Migel Sinclair MD regarding atrial fibrillation and failed medications. She was diagnosed about one year ago by PCP when she went in for extreme fatigue. Her main symptoms are fatigue, SOA, and palpitations. She was initially tried on Sotalol with ECV and only maintained sinus for a few minutes and a second cardioversion couple weeks later which then resulted in normal rhythm however lasted only about 24 hours.. She then tried  Flecainide with subsequent ECV and maintained sinus for a  She tells me that she had TTE which was reportedly normal and after talking to Dr Sherwood office EF 65%. She has had normal stress test. No issues with CP, PND, orthopnea, dizziness, syncope.      The following portions of the patient's history were reviewed and updated as appropriate: allergies, current medications, past family history, past medical history, past social history, past surgical history and problem list.      Medical History         Past Medical History   Diagnosis Date   • Arrhythmia     • Arthritis     • Atrial fibrillation     • Bilateral kidney stones     • Cough     • Hypertension     • Muscle weakness (generalized)     • Palpitations     • SOB (shortness of breath)     • Urination frequency                  Surgical History          Past Surgical History   Procedure Laterality Date   • Replacement total knee bilateral       • Carpal tunnel release       • Sinus surgery       • Bariatric surgery       • Cardioversion       • Cardiac catheterization                 Social History    Social History            Social History   • Marital status:        Spouse name: N/A   • Number of children: N/A   • Years of education: N/A      Occupational History   • Not on file.           Social History Main Topics   • Smoking status: Never Smoker   • Smokeless tobacco: Never Used   • Alcohol use No   • Drug use: No   • Sexual activity: Defer           Other Topics Concern   • Not on file      Social History Narrative               Family History   Problem Relation Age of Onset   • Heart attack Mother     • Heart attack Father     • Heart disease Brother     • Diabetes Brother     • Kidney disease Brother              REVIEW OF SYSTEMS:   CONSTITUTIONAL: No weight loss, fever, chills. + fatigue  HEENT: Eyes: No visual loss, blurred vision, double vision or yellow sclerae. Ears, Nose, Throat: No hearing loss, sneezing, congestion, runny nose or  "sore throat.   SKIN: No rash or itching.   RESPIRATORY: No hemoptysis, cough or sputum. + SOB noted.  GASTROINTESTINAL: No anorexia, nausea, vomiting or diarrhea. No abdominal pain, bright red blood per rectum or melena.  GENITOURINARY: No burning on urination, hematuria or increased frequency.  NEUROLOGICAL: No headache, dizziness, syncope, paralysis, ataxia, numbness or tingling in the extremities. No change in bowel or bladder control.   MUSCULOSKELETAL: No muscle, back pain, joint pain or stiffness.   HEMATOLOGIC: No anemia, bleeding or bruising.   LYMPHATICS: No enlarged nodes. No history of splenectomy.   PSYCHIATRIC: No history of depression, anxiety, hallucinations.   ENDOCRINOLOGIC: No reports of sweating, cold or heat intolerance. No polyuria or polydipsia.   Ext: No edema or bruising        The patient's old records including ambulatory rhythm recordings (ECGs, Holter/event monitor) were reviewed and discussed.            Objective:   Objective       Vitals        Vitals:     01/23/17 0917   BP: 134/88   BP Location: Left arm   Patient Position: Sitting   Pulse: 112   Weight: 214 lb 12.8 oz (97.4 kg)   Height: 64\" (162.6 cm)            Constitutional: oriented to person, place, and time. well-developed and well-nourished. No distress.   HENT: Normocephalic.   Eyes: Conjunctivae are normal. No scleral icterus.   Neck: Normal carotid pulses, no hepatojugular reflux and no JVD present. Carotid bruit is not present. No tracheal deviation, no edema and no erythema present. No thyromegaly present.   Cardiovascular: Irregularly irregular tachy, S1 normal, S2 normal, normal heart sounds and intact distal pulses. No extrasystoles are present. PMI is not displaced. Exam reveals no gallop, no distant heart sounds and no friction rub.   No murmur heard.  Pulses:  Radial pulses are 2+ on the right side, and 2+ on the left side.  Dorsalis pedis pulses are 2+ on the right side, and 2+ on the left side. "   Pulmonary/Chest: Effort normal and breath sounds normal. No respiratory distress. She has no decreased breath sounds. no wheezes, Rhonchi or rales. no tenderness.   Abdominal: Soft. Bowel sounds are normal. She exhibits no distension and no mass. There is no hepatosplenomegaly. There is no tenderness. There is no rebound and no guarding.   Musculoskeletal: Trace edema, tenderness or deformity.   Neurological: is alert and oriented to person, place, and time.   Skin: Skin is warm and dry. No rash noted. No diaphoretic. No cyanosis or erythema. No pallor. Nails show no clubbing.   Psychiatric: Normal mood and affect.Speech is normal and behavior is normal.     Lab Review:         Results for orders placed or performed during the hospital encounter of 11/29/16   CBC (No Diff)   Result Value Ref Range     WBC 9.41 3.50 - 10.80 10*3/mm3     RBC 4.98 3.89 - 5.14 10*6/mm3     Hemoglobin 13.9 11.5 - 15.5 g/dL     Hematocrit 43.4 34.5 - 44.0 %     MCV 87.1 80.0 - 99.0 fL     MCH 27.9 27.0 - 31.0 pg     MCHC 32.0 32.0 - 36.0 g/dL     RDW 15.0 (H) 11.3 - 14.5 %     RDW-SD 48.0 37.0 - 54.0 fl     MPV 10.5 6.0 - 12.0 fL     Platelets 277 150 - 450 10*3/mm3   Basic Metabolic Panel   Result Value Ref Range     Glucose 109 (H) 70 - 100 mg/dL     BUN 20 9 - 23 mg/dL     Creatinine 0.80 0.60 - 1.30 mg/dL     Sodium 141 132 - 146 mmol/L     Potassium 4.5 3.5 - 5.5 mmol/L     Chloride 103 99 - 109 mmol/L     CO2 30.0 20.0 - 31.0 mmol/L     Calcium 10.4 8.7 - 10.4 mg/dL     eGFR Non African Amer 71 >60 mL/min/1.73     BUN/Creatinine Ratio 25.0 7.0 - 25.0     Anion Gap 8.0 3.0 - 11.0 mmol/L           ECG 12 Lead  Date/Time: 1/23/2017 10:19 AM  Performed by: NOY MIJARES  Authorized by: MARYANA, NOY   Rhythm: atrial fibrillation  Comments: Afib with RVR                Diagnosis:   1. Persistent atrial fibrillation        Assessment & Plan:      1. Symptomatic Persistent Atrial Fibrillation w/ failure of Sotalol, Flecainide, and  ECV x 3 with Early Recurrence of Afib. CHADS VASC 2 with age and female. Will plan for PVA in near future per patients preference since she kareen failed to drugs and continues to feels very symptomatic with the atrial fibrillation with shortness of breath and fatigue like she's never had. CHADS-VASC=2, on Eliquis 5mg BID. Currently on Bystolic 10mg BID. Will add Cardizem 120mg daily for better rate control and will send a EKG next week to look at rates. The risks, benefits, and alternatives of the procedure have been reviewed and the patient wishes to proceed. Normal EF and negative stress for ischemia in the past.          Scribed for Miguelito Owusu DO by Merline Nieto PA-C. 1/23/2017 9:49 AM     Miguelito DHALIWAL DO, personally performed the services described in this documentation as scribed by the above named individual in my presence, and it is both accurate and complete. 1/23/17 10:22 AM    Miguelito Owusu DO  10:22 AM  01/23/17    History and Physical Update 2/22/17    No changes in condition since seen on 1/23/17    Lab Review 2/21/17    T4- 0.83    TSH- 6.867    HGA1C- 6.10    MG-2.2    INR-1.01    CMP-glucose 112, BUN 17, creatinine 0.8, sodium 139, potassium 4.6, chloride 105, CO2 27, calcium 9.8, total protein 7.4, albumin 4.6, AST 13, AST 22, alkaline phosphatase 121, total bilirubin 0.9.    CBC-WBC 9.70, hemoglobin 13.7, hematocrit 40.2, platelet count 280,000.    Chasity Neely RN BSN  2/22/17 1230pm          STAFF:  66-year-old female with history of Symptomatic Persistent Symptomatic despite medical therapy including Flec, Sotalol and ECVs here for BRETT, PVA. The risks, benefits, and alternatives of the procedure have been reviewed and the patient wishes to proceed.       Miguelito DHALIWAL have reviewed the note in full and agree with all aspects of the above including physical exam, assessment, labs and plan with changes made accordingly.     Miguelito Owusu DO  02/22/17  12:56 PM

## 2017-02-22 NOTE — PLAN OF CARE
Problem: Patient Care Overview (Adult)  Goal: Plan of Care Review  Outcome: Ongoing (interventions implemented as appropriate)    02/22/17 1040   Coping/Psychosocial Response Interventions   Plan Of Care Reviewed With patient   Patient Care Overview   Progress no change   Outcome Evaluation   Outcome Summary/Follow up Plan Pt to have PVA with Dr. Owusu.

## 2017-02-23 VITALS
SYSTOLIC BLOOD PRESSURE: 145 MMHG | WEIGHT: 215.17 LBS | BODY MASS INDEX: 36.73 KG/M2 | TEMPERATURE: 97.6 F | RESPIRATION RATE: 18 BRPM | HEIGHT: 64 IN | DIASTOLIC BLOOD PRESSURE: 77 MMHG | HEART RATE: 94 BPM | OXYGEN SATURATION: 98 %

## 2017-02-23 PROCEDURE — 93005 ELECTROCARDIOGRAM TRACING: CPT | Performed by: INTERNAL MEDICINE

## 2017-02-23 PROCEDURE — G0378 HOSPITAL OBSERVATION PER HR: HCPCS

## 2017-02-23 PROCEDURE — 99225 PR SBSQ OBSERVATION CARE/DAY 25 MINUTES: CPT | Performed by: INTERNAL MEDICINE

## 2017-02-23 PROCEDURE — 25010000002 KETOROLAC TROMETHAMINE PER 15 MG: Performed by: INTERNAL MEDICINE

## 2017-02-23 RX ORDER — SUCRALFATE 1 G/1
1 TABLET ORAL
Qty: 28 TABLET | Refills: 0 | Status: SHIPPED | OUTPATIENT
Start: 2017-02-23 | End: 2017-03-24

## 2017-02-23 RX ORDER — METOPROLOL TARTRATE 50 MG/1
50 TABLET, FILM COATED ORAL 2 TIMES DAILY
Qty: 60 TABLET | Refills: 3 | Status: SHIPPED | OUTPATIENT
Start: 2017-02-23 | End: 2017-06-12 | Stop reason: SDUPTHER

## 2017-02-23 RX ORDER — PANTOPRAZOLE SODIUM 40 MG/1
40 TABLET, DELAYED RELEASE ORAL DAILY
Qty: 30 TABLET | Refills: 0 | Status: SHIPPED | OUTPATIENT
Start: 2017-02-23 | End: 2017-02-23

## 2017-02-23 RX ORDER — PANTOPRAZOLE SODIUM 40 MG/1
40 TABLET, DELAYED RELEASE ORAL DAILY
Qty: 30 TABLET | Refills: 0 | Status: SHIPPED | OUTPATIENT
Start: 2017-02-23 | End: 2017-03-24

## 2017-02-23 RX ORDER — METOPROLOL TARTRATE 50 MG/1
50 TABLET, FILM COATED ORAL 2 TIMES DAILY
Qty: 60 TABLET | Refills: 3 | Status: SHIPPED | OUTPATIENT
Start: 2017-02-23 | End: 2017-02-23

## 2017-02-23 RX ORDER — FLECAINIDE ACETATE 50 MG/1
50 TABLET ORAL EVERY 12 HOURS SCHEDULED
Qty: 60 TABLET | Refills: 3 | Status: SHIPPED | OUTPATIENT
Start: 2017-02-23 | End: 2017-02-23

## 2017-02-23 RX ORDER — FLECAINIDE ACETATE 50 MG/1
50 TABLET ORAL EVERY 12 HOURS SCHEDULED
Qty: 60 TABLET | Refills: 3 | Status: SHIPPED | OUTPATIENT
Start: 2017-02-23 | End: 2017-06-12 | Stop reason: SDUPTHER

## 2017-02-23 RX ORDER — SUCRALFATE 1 G/1
1 TABLET ORAL
Qty: 28 TABLET | Refills: 0 | Status: SHIPPED | OUTPATIENT
Start: 2017-02-23 | End: 2017-02-23

## 2017-02-23 RX ADMIN — APIXABAN 5 MG: 5 TABLET, FILM COATED ORAL at 09:12

## 2017-02-23 RX ADMIN — OXYCODONE AND ACETAMINOPHEN 1 TABLET: 5; 325 TABLET ORAL at 02:03

## 2017-02-23 RX ADMIN — FLECAINIDE ACETATE 50 MG: 50 TABLET ORAL at 09:12

## 2017-02-23 RX ADMIN — SUCRALFATE 1 G: 1 TABLET ORAL at 09:12

## 2017-02-23 RX ADMIN — KETOROLAC TROMETHAMINE 15 MG: 15 INJECTION, SOLUTION INTRAMUSCULAR; INTRAVENOUS at 09:14

## 2017-02-23 RX ADMIN — NEBIVOLOL HYDROCHLORIDE 10 MG: 10 TABLET ORAL at 09:13

## 2017-02-23 RX ADMIN — PANTOPRAZOLE SODIUM 40 MG: 40 TABLET, DELAYED RELEASE ORAL at 05:10

## 2017-02-23 NOTE — NURSING NOTE
Ablation Nurse Navigator    Pt s/p PVA w/RFA of LA roof & highly fractionated signals along posterior wall  as well as a box in lesion on the posterior wall yesterday. Doing well this AM. No C/O, feeling good.  NSR on Tele.  HR 80s, /77.  Has had breakfast without nausea, has voided without difficulty and has ambulated in the irving without dizziness / difficulty or groin issues.  Going home soon.  Reviewed D/C information with patient and her , particularly normal post procedural expectations and what to call for.  Ablation discharge instruction handout left for future reference.  Verbalized understanding.  Will follow up in the AFib Clinic in one month and with Dr. Owusu in 3 months.  I gave them my contact information and encouraged to call me with questions or concerns in the interim.    Of note, TSH somewhat elevated at 6.867, T4 slightly low at 0.83.  She will follow up with her PCP about this.  I gave her copy of her lab results to take with her.      REBECA LearyN, RN  02/23/17  10:12 AM

## 2017-02-23 NOTE — PROGRESS NOTES
Discharge Planning Assessment  [facility]     Patient Name: Chelsey Herrera  MRN: [id number]  Today's Date: 2/23/2017    Admit Date: 2/22/2017          Discharge Needs Assessment       02/23/17 1027    Living Environment    Lives With spouse    Living Arrangements house   Lives in [facility]    Provides Primary Care For no one    Quality Of Family Relationships supportive;involved;helpful    Able to Return to Prior Living Arrangements yes    Discharge Needs Assessment    Concerns To Be Addressed denies needs/concerns at this time    Anticipated Changes Related to Illness none    Equipment Currently Used at Home none    Equipment Needed After Discharge none    Transportation Available car;family or friend will provide    Discharge Disposition still a patient    Discharge Contact Information if Applicable [phone]            Discharge Plan       02/23/17 1028    Case Management/Social Work Plan    Plan Home    Patient/Family In Agreement With Plan yes    Additional Comments Discussed with pt and s.o. Pt is independent of ADL's. Denies HH or DME. Pt has Medicare and [place] [employer] PPO secondary. Meds are affordable. Denies needs. CM will follow.         Discharge Placement     No information found        Expected Discharge Date and Time     Expected Discharge Date Expected Discharge Time    Feb 23, 2017               Demographic Summary       02/23/17 1026    Referral Information    Referral Source admission list    Contact Information    Permission Granted to Share Information With     Primary Care Physician Information    Name Dr. Everardo Mccormack            Functional Status       02/23/17 1027    Functional Status Prior    Ambulation 0-->independent    Transferring 0-->independent    Toileting 0-->independent    Bathing 0-->independent    Dressing 0-->independent    Eating 0-->independent    Communication 0-->understands/communicates without difficulty    Swallowing 0-->swallows  foods/liquids without difficulty    IADL    Medications independent    Meal Preparation independent    Housekeeping independent    Laundry independent    Shopping independent    Oral Care independent    Activity Tolerance    Current Activity Limitations none    Usual Activity Tolerance good    Current Activity Tolerance moderate            Psychosocial     None            Abuse/Neglect     None            Legal     None            Substance Abuse     None            Patient Forms     None          Merline Multani

## 2017-02-23 NOTE — DISCHARGE SUMMARY
Date of Discharge:  2/23/2017    Discharge Diagnosis: Persistent atrial fibrillation, status post pulmonary vein ablation    Presenting Problem/History of Present Illness  Persistent atrial fibrillation [I48.1]  Persistent atrial fibrillation [I48.1]      Hospital Course  Patient is a 66 y.o. female with symptomatic persistent atrial fibrillation despite sotalol, flecainide and 3 external cardioversions presented for a scheduled pulmonary vein ablation with Dr. Miguelito Owusu.  Patient underwent a diagnostic electrophysiology study with successful radiofrequency ablation of the pulmonary veins.  The patient was transferred to the telemetry floor for post procedure monitoring where she has remained stable.  He has been ambulating the halls without any difficulties and there is no evidence of any access site bleeding or hematoma..  The patient was continued on Eliquis was 5 mg twice a day and flecainide 50 mg twice a day was initiated.  She will follow-up with the atrial fib clinic in one month and with Dr. Owusu in 10-12 weeks.      Procedures Performed  Procedure(s):  PVA. DNS meds.         Echo EF Estimated  Lab Results   Component Value Date    ECHOEFEST 55 02/22/2017       Condition on Discharge:  Stable    Physical Exam at Discharge    Vital Signs  Temp:  [97.6 °F (36.4 °C)-98.4 °F (36.9 °C)] 97.6 °F (36.4 °C)  Heart Rate:  [] 94  Resp:  [16-22] 18  BP: ()/() 145/77    Physical Exam:   Physical Exam   Constitutional: She is oriented to person, place, and time. She appears well-developed and well-nourished.   HENT:   Head: Normocephalic and atraumatic.   Eyes: Pupils are equal, round, and reactive to light. No scleral icterus.   Neck: No JVD present. Carotid bruit is not present. No thyromegaly present.   Cardiovascular: Normal rate, regular rhythm, S1 normal and S2 normal.  Exam reveals no gallop.    No murmur heard.  Pulmonary/Chest: Effort normal and breath sounds normal.   Abdominal:  Soft. There is no tenderness.   Neurological: She is alert and oriented to person, place, and time.   Skin: Skin is warm and dry. No cyanosis. Nails show no clubbing.   Psychiatric: She has a normal mood and affect. Her behavior is normal.       Discharge Disposition  Home or Self Care    Discharge Medications   Sharon Chelsey GREGORY   Home Medication Instructions ABRAM:[id number]    Printed on:02/23/17 1111   Medication Information                      apixaban (ELIQUIS) 5 MG tablet tablet  Take 5 mg by mouth 2 (Two) Times a Day.             cyclobenzaprine (FLEXERIL) 10 MG tablet  Take 10 mg by mouth At Night As Needed for muscle spasms.             flecainide (TAMBOCOR) 50 MG tablet  Take 1 tablet by mouth Every 12 (Twelve) Hours.             furosemide (LASIX) 40 MG tablet  Take 40 mg by mouth Daily As Needed (swelling).             HYDROcodone-acetaminophen (NORCO) 7.5-325 MG per tablet  Take 1 tablet by mouth Every 6 (Six) Hours As Needed for moderate pain (4-6).             metoprolol tartrate (LOPRESSOR) 50 MG tablet  Take 1 tablet by mouth 2 (Two) Times a Day.             pantoprazole (PROTONIX) 40 MG EC tablet  Take 1 tablet by mouth Daily. Take for one month             potassium chloride (K-DUR,KLOR-CON) 20 MEQ CR tablet  Take 20 mEq by mouth As Needed (with lasix).             sucralfate (CARAFATE) 1 G tablet  Take 1 tablet by mouth 4 (Four) Times a Day Before Meals & at Bedtime. Take for one week                 Discharge Diet:  healthy heart    Follow-up Appointments  Future Appointments  Date Time Provider Department Center   3/24/2017 10:00 AM TYLER Sommers [facility] None   6/12/2017 3:00 PM Miguelito Owusu DO [facility] None     Additional Instructions for the Follow-ups that You Need to Schedule     Discharge Follow-up with Specified Provider    As directed    Follow Up:  3 Months   Follow Up Details:  Follow up with Afib clinic in one month and Dr Owusu in 3 months                 Test  Results Pending at Discharge       TYLER Rodriguez  02/23/17  10:43 AM    Janae SCOTT dictating as a scribe for Dr. Miguelito Owusu      I, Miguelito Owusu have reviewed the note in full and agree with all aspects of the above including physical exam, assessment, labs and plan with changes made accordingly.     Miguelito Owusu DO  02/23/17  11:27 AM

## 2017-02-23 NOTE — PLAN OF CARE
Problem: Patient Care Overview (Adult)  Goal: Plan of Care Review  Outcome: Ongoing (interventions implemented as appropriate)    02/23/17 0520   Coping/Psychosocial Response Interventions   Plan Of Care Reviewed With patient   Patient Care Overview   Progress improving   Outcome Evaluation   Outcome Summary/Follow up Plan Vitals stable. SR on the monitor. Pt c/o chronic back pain, dose of toradol IV, percocet given prn. Pt tolerated activity well post PVA. No further complain.         Problem: Arrhythmia/Dysrhythmia (Symptomatic) (Adult)  Goal: Signs and Symptoms of Listed Potential Problems Will be Absent or Manageable (Arrhythmia/Dysrhythmia)  Outcome: Ongoing (interventions implemented as appropriate)    Problem: Cardiac Catheterization with/without PCI (Adult)  Goal: Signs and Symptoms of Listed Potential Problems Will be Absent or Manageable (Cardiac Catheterization with/without PCI)  Outcome: Ongoing (interventions implemented as appropriate)

## 2017-02-23 NOTE — PROGRESS NOTES
[facility] at [facility]  Cardiovascular Consultation Note  Chelsey Herrera  [id number]  [id number]  1950    DATE OF ADMISSION: 2/22/2017  DATE OF FOLLOW UP:  2/23/2017  /  Everardo Mccormack MD    Subjective:     Patient ID: Chelsey Herrera is a 66 y.o. female.    Chief Complaint: Afib s/p PVA      Allergies   Allergen Reactions   • Accupril [Quinapril Hcl] Cough       Current Facility-Administered Medications:   •  apixaban (ELIQUIS) tablet 5 mg, 5 mg, Oral, BID, Miguelito Umer, DO, 5 mg at 02/22/17 2151  •  flecainide (TAMBOCOR) tablet 50 mg, 50 mg, Oral, Q12H, Miguelito Umer, DO, 50 mg at 02/22/17 2151  •  furosemide (LASIX) tablet 40 mg, 40 mg, Oral, Daily PRN, Miguelito Umer, DO  •  HYDROmorphone PF (DILAUDID) injection 0.5 mg, 0.5 mg, Intravenous, Q5 Min PRN, Criselda Barragan CRNA  •  ketorolac (TORADOL) injection 15 mg, 15 mg, Intravenous, Q12H, Miguelito Umer, DO, 15 mg at 02/22/17 1925  •  labetalol (NORMODYNE,TRANDATE) injection 5 mg, 5 mg, Intravenous, Q5 Min PRN, Criselda Barragan CRNA  •  lactated ringers bolus 500 mL, 500 mL, Intravenous, Once PRN, Criselda Barragan CRNA  •  lactated ringers infusion, 100 mL/hr, Intravenous, Continuous, Kurtis Gerber CRNA, 100 mL/hr at 02/22/17 2346  •  midazolam (VERSED) 2 MG/2ML injection  - ADS Override Pull, , , ,   •  nebivolol (BYSTOLIC) tablet 10 mg, 10 mg, Oral, Q24H, Miguelito Umer, DO  •  ondansetron (ZOFRAN) injection 4 mg, 4 mg, Intravenous, Once PRN, Kurtis Gerber CRNA  •  ondansetron (ZOFRAN) injection 4 mg, 4 mg, Intravenous, Q6H PRN, Miguelito Umer, DO  •  oxyCODONE-acetaminophen (PERCOCET) 5-325 MG per tablet 1 tablet, 1 tablet, Oral, Q4H PRN, Miguelito Owusu DO, 1 tablet at 02/23/17 0203  •  pantoprazole (PROTONIX) EC tablet 40 mg, 40 mg, Oral, Q AM, Miguelito Owusu DO, 40 mg at 02/23/17 0510  •  promethazine (PHENERGAN) injection 6.25 mg, 6.25 mg, Intravenous, Once PRN **OR** promethazine (PHENERGAN) injection  6.25 mg, 6.25 mg, Intramuscular, Once PRN **OR** promethazine (PHENERGAN) suppository 25 mg, 25 mg, Rectal, Once PRN **OR** promethazine (PHENERGAN) tablet 25 mg, 25 mg, Oral, Once PRN, Criselda Barragan CRNA  •  sodium chloride 0.9 % flush 1-10 mL, 1-10 mL, Intravenous, PRN, Miguelito Umer, DO  •  sucralfate (CARAFATE) tablet 1 g, 1 g, Oral, 4x Daily AC & at Bedtime, Miguelito Umer, DO, 1 g at 02/22/17 2151    History of Present Illness  Patient doing okay today with no major complaints.  Status post pulmonary vein ablation.  No chest pain and groin sites are okay.    The following portions of the patient's history were reviewed and updated as appropriate: allergies, current medications, past family history, past medical history, past social history, past surgical history and problem list.    ROS   14 point ROS negative except as outlined in problem list, HPI and other parts of the note.    Procedures       Objective:       Vitals:    02/22/17 2100 02/23/17 0100 02/23/17 0400 02/23/17 0438   BP: 128/64 95/63 111/68 139/83   BP Location:       Patient Position:       Pulse: 71 54 73 71   Resp:  22 16 20   Temp:  98.2 °F (36.8 °C)  98.4 °F (36.9 °C)   TempSrc:  Oral  Oral   SpO2: 100%  98%    Weight:       Height:           Intake/Output Summary (Last 24 hours) at 02/23/17 0858  Last data filed at 02/23/17 0435   Gross per 24 hour   Intake   2840 ml   Output   1250 ml   Net   1590 ml       GENERAL: Well-developed, well-nourished patient in no acute distress.  HEENT: Normocephalic, atraumatic, PERRLA. Moist mucous membranes.  NECK: No JVD present at 30°. No carotid bruits auscultated.  LUNGS: Clear to auscultation.  CARDIOVASCULAR: Heart has a regular rate and rhythm. No murmurs, gallops or rubs noted.   ABDOMEN: Soft, nontender. Positive bowel sounds.  MUSCULOSKELETAL: No gross deformities. No clubbing, cyanosis  EXT: pulses intact, no swelling  SKIN: Pink, warm  Neuro: Nonfocal exam. Gait intact  Groin sites  ok    The patient's old records including ambulatory rhythm recordings (ECGs, Holter/event monitor) were reviewed and discussed.      Lab Review:     Results from last 7 days  Lab Units 02/21/17  1200   SODIUM mmol/L 139   POTASSIUM mmol/L 4.6   CHLORIDE mmol/L 105   TOTAL CO2 mmol/L 27.0   BUN mg/dL 17   CREATININE mg/dL 0.80   GLUCOSE mg/dL 112*   CALCIUM mg/dL 9.8           Results from last 7 days  Lab Units 02/21/17  1200   WBC 10*3/mm3 9.70   HEMOGLOBIN g/dL 13.7   HEMATOCRIT % 40.2   PLATELETS 10*3/mm3 282       Results from last 7 days  Lab Units 02/21/17  1200   INR  1.01           Results from last 7 days  Lab Units 02/21/17  1200   MAGNESIUM mg/dL 2.2         Tele: NSR with rates in the 80s  NSR with QRS 84 msec, QTc ok            Assessment & Plan:   1. Symptomatic persistent atrial fibrillation despite sotalol flecainide therapy.  Status post pulmonary vein ablation yesterday with ablation along the roof and also along highly fractionated signals of the posterior wall with box in lesion as well. In SR today.  Started patient back on flecainide 50 mg twice a day and will change to Bystolic over to metoprolol 50 mg twice a day due to issue with insurance coverage. Stop Cardizem as well. Continue NOAC    Follow up with [facility] Fib clinic in one month and myself in 3 months.  Okay to discharge home about 11 AM if no issues           Miguelito Owusu DO  02/23/17  8:58 AM        EMR Dragon/Transcription disclaimer:  Much of this encounter note is an electronic transcription/translation of spoken language to printed text. Electronic translation of spoken language may permit erroneous, or at times, nonsensical words or phrases to be inadvertently transcribed. Although I have reviewed the note for such errors, some may still exist.

## 2017-03-24 ENCOUNTER — PROCEDURE VISIT (OUTPATIENT)
Dept: CARDIOLOGY | Facility: HOSPITAL | Age: 67
End: 2017-03-24

## 2017-03-24 ENCOUNTER — OFFICE VISIT (OUTPATIENT)
Dept: CARDIOLOGY | Facility: HOSPITAL | Age: 67
End: 2017-03-24

## 2017-03-24 VITALS
OXYGEN SATURATION: 99 % | HEART RATE: 68 BPM | RESPIRATION RATE: 18 BRPM | SYSTOLIC BLOOD PRESSURE: 149 MMHG | WEIGHT: 220 LBS | DIASTOLIC BLOOD PRESSURE: 67 MMHG | BODY MASS INDEX: 37.56 KG/M2 | TEMPERATURE: 97.8 F | HEIGHT: 64 IN

## 2017-03-24 DIAGNOSIS — I48.0 PAROXYSMAL ATRIAL FIBRILLATION (HCC): Primary | ICD-10-CM

## 2017-03-24 DIAGNOSIS — I48.0 PAROXYSMAL ATRIAL FIBRILLATION (HCC): ICD-10-CM

## 2017-03-24 PROCEDURE — 93010 ELECTROCARDIOGRAM REPORT: CPT | Performed by: INTERNAL MEDICINE

## 2017-03-24 PROCEDURE — 99215 OFFICE O/P EST HI 40 MIN: CPT | Performed by: NURSE PRACTITIONER

## 2017-03-24 PROCEDURE — 93005 ELECTROCARDIOGRAM TRACING: CPT

## 2017-03-24 RX ORDER — LEVOTHYROXINE SODIUM 0.03 MG/1
25 TABLET ORAL DAILY
Refills: 0 | COMMUNITY
Start: 2017-03-01 | End: 2020-07-06

## 2017-03-24 NOTE — PROGRESS NOTES
Encounter Date: 03/24/2017    Patient ID: Chelsey Herrera is a 66 y.o. female    Chief Complaint: Establish Care (1 month post PVA)     HPI :    This a patient with persistent atrial fibrillation who underwent PVA one month ago he comes in today for follow-up.  She relates that around the 13th of this month she went into atrial fibrillation which lasted approximately a week.  She had heart rates ranging from  bpm accompanied by significant shortness of breath and fatigue.  She has a pulse oximeter at home that she uses to monitor her heart rate and had oxygen levels as low as 70s and 80%.  Despite her 's urging she did not call or seek treatment.  Her symptoms eventually abated on her own.  She has had brief symptoms of atrial fibrillation lasting up to an hour since then lasting shortness of breath.  Her symptoms occur when she exerts herself such as walking for greater than one hour.    She denies shortness of breath otherwise, PND, orthopnea, edema, chest pain/pressure.  There has been no hematuria, hematochezia, melena.    Past Medical History:   Diagnosis Date   • Arrhythmia    • Arthritis    • Atrial fibrillation     CHADS-VASc = 3   • Back pain    • Bilateral kidney stones    • Cough    • Dental bridge present    • Fibromyalgia    • Hypertension    • Muscle weakness (generalized)    • Palpitations    • PONV (postoperative nausea and vomiting)    • SOB (shortness of breath)    • Urination frequency    • Wears eyeglasses     reading         Past Surgical History:   Procedure Laterality Date   • BARIATRIC SURGERY      gastric bypass   • CARDIAC CATHETERIZATION     • CARDIAC ELECTROPHYSIOLOGY PROCEDURE N/A 2/22/2017    Procedure: PVA. DNS meds.;  Surgeon: Miguelito Owusu DO;  Location: [facility] INVASIVE LOCATION;  Service:    • CARDIOVERSION      X 2   • CARPAL TUNNEL RELEASE Bilateral    • COLONOSCOPY      5 years ago   • GALLBLADDER SURGERY     • OTHER SURGICAL HISTORY      uterus lift/repair    • REPLACEMENT TOTAL KNEE BILATERAL Bilateral    • SINUS SURGERY      X 2       Social History     Social History   • Marital status:      Spouse name: N/A   • Number of children: N/A   • Years of education: N/A     Occupational History   • Not on file.     Social History Main Topics   • Smoking status: Never Smoker   • Smokeless tobacco: Never Used   • Alcohol use No   • Drug use: No   • Sexual activity: Defer     Other Topics Concern   • Not on file     Social History Narrative    Patient consumes 2  servings coffee daily.     Patient lives at home with , Yash.                Family History   Problem Relation Age of Onset   • Heart attack Mother    • Heart attack Father    • Heart disease Brother    • Diabetes Brother    • Kidney disease Brother        Review of Systems:  Review of Systems   Constitution: Positive for chills, weakness, malaise/fatigue and weight gain. Negative for decreased appetite, diaphoresis, fever, night sweats and weight loss.   HENT: Positive for headaches. Negative for congestion and nosebleeds.    Eyes: Negative.  Negative for blurred vision, visual disturbance and visual halos.   Cardiovascular: Positive for dyspnea on exertion, irregular heartbeat and palpitations. Negative for chest pain, claudication, cyanosis, leg swelling, near-syncope, orthopnea, paroxysmal nocturnal dyspnea and syncope.   Respiratory: Positive for cough, shortness of breath and snoring. Negative for hemoptysis, sleep disturbances due to breathing, sputum production and wheezing.    Endocrine: Negative for cold intolerance, heat intolerance, polydipsia, polyphagia and polyuria.   Hematologic/Lymphatic: Bruises/bleeds easily.   Skin: Negative for dry skin, itching and rash.   Musculoskeletal: Positive for arthritis. Negative for falls, muscle weakness and myalgias.   Gastrointestinal: Positive for abdominal pain. Negative for bloating, constipation, diarrhea, dysphagia, heartburn, melena, nausea and  "vomiting.   Genitourinary: Negative for dysuria, flank pain, hematuria and nocturia.   Neurological: Positive for difficulty with concentration, excessive daytime sleepiness and loss of balance. Negative for dizziness.   Psychiatric/Behavioral: Negative for altered mental status and depression. The patient has insomnia. The patient is not nervous/anxious.    Allergic/Immunologic: Positive for environmental allergies.       Objective:  Vitals:    03/24/17 0938 03/24/17 0939 03/24/17 0946   BP: 172/77 158/59 149/67   BP Location: Right arm Left arm Left arm   Patient Position: Sitting Sitting Standing   Pulse: 68  68   Resp: 18     Temp: 97.8 °F (36.6 °C)     TempSrc: Temporal Artery      SpO2: 99%     Weight: 220 lb (99.8 kg)     Height: 64\" (162.6 cm)       Physical Exam   Constitutional: She is oriented to person, place, and time. She appears well-developed and well-nourished. No distress.   obese   HENT:   Head: Normocephalic and atraumatic.   Mouth/Throat: Oropharynx is clear and moist. No oropharyngeal exudate.   Eyes: Conjunctivae are normal. Pupils are equal, round, and reactive to light.   Neck: No JVD present. No tracheal deviation present. No thyromegaly present.   Cardiovascular: Normal rate, regular rhythm, normal heart sounds and intact distal pulses.  Exam reveals no gallop and no friction rub.    No murmur heard.  Pulmonary/Chest: Effort normal and breath sounds normal. No respiratory distress. She has no wheezes. She has no rales. She exhibits no tenderness.   Abdominal: Soft. Bowel sounds are normal. She exhibits no distension.   Musculoskeletal: She exhibits no edema.   Neurological: She is alert and oriented to person, place, and time.   Skin: Skin is warm and dry.   Psychiatric: She has a normal mood and affect.   Accompanied by    :    Current Outpatient Prescriptions:   •  apixaban (ELIQUIS) 5 MG tablet tablet, Take 5 mg by mouth 2 (Two) Times a Day., Disp: , Rfl:   •  cyclobenzaprine " (FLEXERIL) 10 MG tablet, Take 10 mg by mouth At Night As Needed for muscle spasms., Disp: , Rfl:   •  flecainide (TAMBOCOR) 50 MG tablet, Take 1 tablet by mouth Every 12 (Twelve) Hours., Disp: 60 tablet, Rfl: 3  •  furosemide (LASIX) 40 MG tablet, Take 40 mg by mouth Daily As Needed (swelling)., Disp: , Rfl:   •  HYDROcodone-acetaminophen (NORCO) 7.5-325 MG per tablet, Take 1 tablet by mouth Every 6 (Six) Hours As Needed for moderate pain (4-6)., Disp: , Rfl:   •  metoprolol tartrate (LOPRESSOR) 50 MG tablet, Take 1 tablet by mouth 2 (Two) Times a Day., Disp: 60 tablet, Rfl: 3  •  pantoprazole (PROTONIX) 40 MG EC tablet, Take 1 tablet by mouth Daily. Take for one month, Disp: 30 tablet, Rfl: 0  •  potassium chloride (K-DUR,KLOR-CON) 20 MEQ CR tablet, Take 20 mEq by mouth As Needed (with lasix)., Disp: , Rfl:   •  sucralfate (CARAFATE) 1 G tablet, Take 1 tablet by mouth 4 (Four) Times a Day Before Meals & at Bedtime. Take for one week, Disp: 28 tablet, Rfl: 0     Lab/Diagnostic Studies:  EKG in the office today shows normal sinus rhythm with nonspecific T-wave abnormality.  Intervals are acceptable.  Final analysis is pending.    Assessment:    Atrial fibrillation:  -CHADSVASc score equals 2  -Status post PVA one month ago  -Currently in normal sinus rhythm  -On apixaban, Lopressor, flecainide    Hypertension, elevated in the office today, but tells me it runs 140/80 at home        Plan:  -Continue current medications  -Call for sustained atrial fib lasting greater than 4 hours or causing hypoxia for any duration of time      Discussion:      *Please note that portions of this documentation may have been completed with a voice recognition program.  Efforts were made to edit this dictation, but occasional words may have been mistranscribed.

## 2017-06-12 ENCOUNTER — OFFICE VISIT (OUTPATIENT)
Dept: CARDIOLOGY | Facility: CLINIC | Age: 67
End: 2017-06-12

## 2017-06-12 VITALS
HEART RATE: 73 BPM | SYSTOLIC BLOOD PRESSURE: 132 MMHG | BODY MASS INDEX: 37.52 KG/M2 | DIASTOLIC BLOOD PRESSURE: 80 MMHG | HEIGHT: 64 IN | WEIGHT: 219.8 LBS

## 2017-06-12 DIAGNOSIS — I48.19 PERSISTENT ATRIAL FIBRILLATION (HCC): Primary | ICD-10-CM

## 2017-06-12 DIAGNOSIS — I10 ESSENTIAL HYPERTENSION: ICD-10-CM

## 2017-06-12 PROCEDURE — 99213 OFFICE O/P EST LOW 20 MIN: CPT | Performed by: PHYSICIAN ASSISTANT

## 2017-06-12 PROCEDURE — 93270 REMOTE 30 DAY ECG REV/REPORT: CPT | Performed by: PHYSICIAN ASSISTANT

## 2017-06-12 RX ORDER — METOPROLOL TARTRATE 50 MG/1
50 TABLET, FILM COATED ORAL 2 TIMES DAILY
Qty: 60 TABLET | Refills: 3 | Status: SHIPPED | OUTPATIENT
Start: 2017-06-12 | End: 2017-10-08 | Stop reason: SDUPTHER

## 2017-06-12 RX ORDER — FLECAINIDE ACETATE 50 MG/1
50 TABLET ORAL EVERY 12 HOURS SCHEDULED
Qty: 60 TABLET | Refills: 3 | Status: SHIPPED | OUTPATIENT
Start: 2017-06-12 | End: 2017-09-29 | Stop reason: SDUPTHER

## 2017-06-12 NOTE — PROGRESS NOTES
Subjective:   Chelsey Herrera  1950  [phone]      06/12/2017    [facility] CARDIOLOGY    Everardo Mccormack MD  [address] [place] [facility] [address] [id number]    REFERRING DOCTOR: Jeremiah Sinclair MD       Patient ID: Chelsey Herrera is a 66 y.o. female.    Chief Complaint:   Chief Complaint   Patient presents with   • Atrial Fibrillation       Allergies   Allergen Reactions   • Accupril [Quinapril Hcl] Cough       Current Outpatient Prescriptions:   •  apixaban (ELIQUIS) 5 MG tablet tablet, Take 5 mg by mouth 2 (Two) Times a Day., Disp: , Rfl:   •  cyclobenzaprine (FLEXERIL) 10 MG tablet, Take 10 mg by mouth At Night As Needed for muscle spasms., Disp: , Rfl:   •  flecainide (TAMBOCOR) 50 MG tablet, Take 1 tablet by mouth Every 12 (Twelve) Hours., Disp: 60 tablet, Rfl: 3  •  furosemide (LASIX) 40 MG tablet, Take 40 mg by mouth Daily As Needed (swelling)., Disp: , Rfl:   •  HYDROcodone-acetaminophen (NORCO) 7.5-325 MG per tablet, Take 1 tablet by mouth Every 6 (Six) Hours As Needed for moderate pain (4-6)., Disp: , Rfl:   •  levothyroxine (SYNTHROID, LEVOTHROID) 25 MCG tablet, Take 25 mcg by mouth Daily., Disp: , Rfl: 0  •  metoprolol tartrate (LOPRESSOR) 50 MG tablet, Take 1 tablet by mouth 2 (Two) Times a Day., Disp: 60 tablet, Rfl: 3  •  potassium chloride (K-DUR,KLOR-CON) 20 MEQ CR tablet, Take 20 mEq by mouth As Needed (with lasix)., Disp: , Rfl:     History of Present Illness    Patient presents today in follow-up for her persistent afib s/p PVA in March. She did have recurrence of afib about one week after procedure that lasted for one week. She had a couple of shorter episdoes after that but none recently to her knowledge. She is reporting fatigue. No issues with chest pain, shortness of breath, fevers, chills, night sweats, PND, orthopnea or palpitations. No recent ER visits or hospital stays.      The following portions of the patient's history were reviewed  "and updated as appropriate: allergies, current medications, past family history, past medical history, past social history, past surgical history and problem list.    ROS   14 point ROS negative except as outlined in problem list, HPI and other parts of the note.      ECG 12 Lead  Date/Time: 6/12/2017 4:24 PM  Performed by: ABI SCOTT  Authorized by: ABI SCOTT   Comparison: compared with previous ECG   Rhythm: sinus rhythm  Rate: normal  BPM: 73  Conduction: conduction normal  ST Segments: ST segments normal  T Waves: T waves normal  QRS axis: normal  Other: no other findings  Clinical impression: normal ECG               Objective:       Vitals:    06/12/17 1547   BP: 132/80   BP Location: Left arm   Patient Position: Sitting   Pulse: 73   Weight: 219 lb 12.8 oz (99.7 kg)   Height: 64\" (162.6 cm)       GENERAL: Well-developed, well-nourished patient in no acute distress.  HEENT: Normocephalic, atraumatic, PERRLA. Moist mucous membranes.  NECK: No JVD present at 30°. No carotid bruits auscultated.  LUNGS: Clear to auscultation.  CARDIOVASCULAR: Heart has a regular rate and rhythm. No murmurs, gallops or rubs noted.   ABDOMEN: Soft, nontender. Positive bowel sounds.  MUSCULOSKELETAL: No gross deformities. No clubbing, cyanosis, or lower extremity edema.  SKIN: Pink, warm  Neuro: Nonfocal exam. Gait intact  Ext: No edema or bruising    The patient's old records including ambulatory rhythm recordings (ECGs, Holter/event monitor) were reviewed and discussed.              Diagnosis:   1. Persistent atrial fibrillation    2. Essential hypertension    Assessment & Plan:     1.Persistent afib s/p PVA with recurrence about one week post PVA. Patient has not felt anymore episodes since procedure but is reporting fatigue. Will get monitor to rule out afib as cause and if no recurrence, can stop Flecainide. Continue all meds for now. Eliquis 5mg BID for a/c     2. HTN, controlled.     3. Follow-up in 2 months.    "     SILAS Montalvo  06/12/17  4:09 PM      EMR Dragon/Transcription disclaimer:  Much of this encounter note is an electronic transcription/translation of spoken language to printed text. Electronic translation of spoken language may permit erroneous, or at times, nonsensical words or phrases to be inadvertently transcribed. Although I have reviewed the note for such errors, some may still exist.

## 2017-07-12 ENCOUNTER — OFFICE VISIT (OUTPATIENT)
Dept: CARDIOLOGY | Facility: CLINIC | Age: 67
End: 2017-07-12

## 2017-07-12 DIAGNOSIS — I48.19 PERSISTENT ATRIAL FIBRILLATION (HCC): ICD-10-CM

## 2017-07-12 PROCEDURE — 93272 ECG/REVIEW INTERPRET ONLY: CPT | Performed by: INTERNAL MEDICINE

## 2017-08-31 ENCOUNTER — OFFICE VISIT (OUTPATIENT)
Dept: CARDIOLOGY | Facility: CLINIC | Age: 67
End: 2017-08-31

## 2017-08-31 VITALS
HEART RATE: 64 BPM | DIASTOLIC BLOOD PRESSURE: 84 MMHG | SYSTOLIC BLOOD PRESSURE: 132 MMHG | BODY MASS INDEX: 37.18 KG/M2 | WEIGHT: 217.8 LBS | HEIGHT: 64 IN

## 2017-08-31 DIAGNOSIS — M79.7 FIBROMYALGIA: ICD-10-CM

## 2017-08-31 DIAGNOSIS — I48.19 PERSISTENT ATRIAL FIBRILLATION (HCC): Primary | ICD-10-CM

## 2017-08-31 PROCEDURE — 99213 OFFICE O/P EST LOW 20 MIN: CPT | Performed by: INTERNAL MEDICINE

## 2017-08-31 PROCEDURE — 93000 ELECTROCARDIOGRAM COMPLETE: CPT | Performed by: INTERNAL MEDICINE

## 2017-08-31 RX ORDER — CHOLECALCIFEROL (VITAMIN D3) 125 MCG
5 CAPSULE ORAL NIGHTLY
COMMUNITY
End: 2020-07-06

## 2017-09-29 RX ORDER — FLECAINIDE ACETATE 50 MG/1
50 TABLET ORAL EVERY 12 HOURS SCHEDULED
Qty: 180 TABLET | Refills: 3 | Status: SHIPPED | OUTPATIENT
Start: 2017-09-29 | End: 2018-10-10 | Stop reason: SDUPTHER

## 2017-10-09 RX ORDER — METOPROLOL TARTRATE 50 MG/1
TABLET, FILM COATED ORAL
Qty: 60 TABLET | Refills: 5 | Status: SHIPPED | OUTPATIENT
Start: 2017-10-09 | End: 2018-04-04 | Stop reason: SDUPTHER

## 2018-01-06 ENCOUNTER — HOSPITAL ENCOUNTER (OUTPATIENT)
Age: 68
End: 2018-01-06
Payer: MEDICARE

## 2018-01-06 DIAGNOSIS — R05: ICD-10-CM

## 2018-01-06 DIAGNOSIS — R07.81: Primary | ICD-10-CM

## 2018-01-06 PROCEDURE — 71100 X-RAY EXAM RIBS UNI 2 VIEWS: CPT

## 2018-01-06 PROCEDURE — 71046 X-RAY EXAM CHEST 2 VIEWS: CPT

## 2018-01-15 ENCOUNTER — OFFICE VISIT (OUTPATIENT)
Dept: CARDIOLOGY | Facility: CLINIC | Age: 68
End: 2018-01-15

## 2018-01-15 VITALS
BODY MASS INDEX: 36.4 KG/M2 | HEIGHT: 64 IN | DIASTOLIC BLOOD PRESSURE: 90 MMHG | SYSTOLIC BLOOD PRESSURE: 140 MMHG | HEART RATE: 73 BPM | WEIGHT: 213.2 LBS

## 2018-01-15 DIAGNOSIS — I10 ESSENTIAL HYPERTENSION: ICD-10-CM

## 2018-01-15 DIAGNOSIS — I48.19 PERSISTENT ATRIAL FIBRILLATION (HCC): Primary | ICD-10-CM

## 2018-01-15 PROCEDURE — 99213 OFFICE O/P EST LOW 20 MIN: CPT | Performed by: INTERNAL MEDICINE

## 2018-01-15 PROCEDURE — 93000 ELECTROCARDIOGRAM COMPLETE: CPT | Performed by: NURSE PRACTITIONER

## 2018-01-15 NOTE — PROGRESS NOTES
Chelsey BRI Sharon  1950  [phone]      01/15/2018    [facility] CARDIOLOGY     Everardo Mccormack MD  [address] [place] [id number]    Chief Complaint   Patient presents with   • Atrial Fibrillation       Problem List:   1. Persistent atrial fibrillation   A. Diagnosed 2016 with c/o extreme fatigue, SOB and palpitations   B. Previously treated with sotalol and flecainide with recurrence   C. Echocardiogram 10/20/16: EF 65%, mild TR   D. 11/10/16: BRETT showing EF 36-40%, mild MR, mild TR, ECV to NSR   E. 11/29/16: recurrence of atrial fibrillation, repeat ECV to NSR   F. S/p successful PVA 02/2017   G. 30-day event monitor 6/12-7/11 showing no recurrence of atrial fibrillation, average HR 72 bpm  2. Palpitations  3. Hypertension  4. Hypothyroidism    Allergies  Allergies   Allergen Reactions   • Accupril [Quinapril Hcl] Cough       Current Medications    Current Outpatient Prescriptions:   •  apixaban (ELIQUIS) 5 MG tablet tablet, Take 5 mg by mouth 2 (Two) Times a Day., Disp: , Rfl:   •  cyclobenzaprine (FLEXERIL) 10 MG tablet, Take 10 mg by mouth At Night As Needed for muscle spasms., Disp: , Rfl:   •  flecainide (TAMBOCOR) 50 MG tablet, Take 1 tablet by mouth Every 12 (Twelve) Hours., Disp: 180 tablet, Rfl: 3  •  furosemide (LASIX) 40 MG tablet, Take 40 mg by mouth Daily As Needed (swelling)., Disp: , Rfl:   •  HYDROcodone-acetaminophen (NORCO) 7.5-325 MG per tablet, Take 1 tablet by mouth Every 6 (Six) Hours As Needed for moderate pain (4-6)., Disp: , Rfl:   •  levothyroxine (SYNTHROID, LEVOTHROID) 25 MCG tablet, Take 25 mcg by mouth Daily., Disp: , Rfl: 0  •  melatonin 5 MG tablet tablet, Take 5 mg by mouth Every Night., Disp: , Rfl:   •  metoprolol tartrate (LOPRESSOR) 50 MG tablet, TAKE 1 TABLET TWICE A DAY, Disp: 60 tablet, Rfl: 5  •  potassium chloride (K-DUR,KLOR-CON) 20 MEQ CR tablet, Take 20 mEq by mouth As Needed (with lasix)., Disp: , Rfl:  "    History of Present Illness   HPI    Pt presents for follow up of persistent atrial fibrillation s/p ablation 02/2017. Since we saw her in August, she was taken off of her flecainide.  She was doing well for about 3 weeks off the flecainide when she developed atrial fibrillation that lasted about 2 days.  She took a flecainide the first day and another flecainide the next day and has been back on her scheduled dose since then.  Episode lasted 2 days, has had no atrial fibrillation since then.  She has been on her Eliquis without bleeding issues or TIA/CVA symptoms.  Symptoms with afib include palpitations, fatigue and shortness of breath.  She otherwise denies any c/o SOB, CP, LH, and dizziness. Denies any hospitalizations or ER visits. Overall feels well.    ROS:  General:  Denies fatigue, weight gain or loss  Cardiovascular:  Denies CP, PND, syncope, near syncope, edema or palpitations.  Pulmonary:  Denies MURILLO, cough, or wheezing      Vitals:    01/15/18 1419   BP: 140/90   BP Location: Left arm   Patient Position: Sitting   Pulse: 73   Weight: 96.7 kg (213 lb 3.2 oz)   Height: 162.6 cm (64\")     PE:  General: NAD  Neck: no JVD, no carotid bruits, no TM  Heart RRR, NL S1, S2, S4 present, no rubs, murmurs  Lungs: CTA, no wheezes, rhonchi, or rales  Abd: soft, non-tender, NL BS  Ext: No musculoskeletal deformities, no edema, cyanosis, or clubbing  Psych: normal mood and affect    Diagnostic Data:        ECG 12 Lead  Date/Time: 1/15/2018 2:27 PM  Performed by: NOY MIJARES  Authorized by: NOY MIJARES   Comparison: compared with previous ECG from 9/5/2017  Similar to previous ECG  Rhythm: sinus rhythm  BPM: 73              1. Persistent atrial fibrillation    2. Essential hypertension          Plan:  1) Persistent atrial fibrillation:  - S/p PVA 02/2017  - 2 day episode of atrial fibrillation in September after stopping flecianide for 3 weeks, no recurrence after restarting her flecainide. Doing well at this " well. If recurrences of Afib consider changing AAD vs redo Ablation but no changes for now.   - Continue flecainide 50 mg BID with QRS and QTc  2) Anticoagulation  - We will continue Eliquis at this time given recurrent atrial fibrillation off flecainide  3) HTN   - Well controlled on current medications  - Wt loss, exercise, salt reduction    F/up in 5 months    Scribed for Miguelito Owusu DO by Tiffany Bagley, TYLER. 1/15/2018  2:27 PM     I, Miguelito Owusu DO, personally performed the services described in this documentation as scribed by the above named individual in my presence, and it is both accurate and complete.  1/15/2018  2:41 PM    Miguelito Owusu DO  2:41 PM  01/15/18

## 2018-04-04 RX ORDER — METOPROLOL TARTRATE 50 MG/1
TABLET, FILM COATED ORAL
Qty: 60 TABLET | Refills: 11 | Status: SHIPPED | OUTPATIENT
Start: 2018-04-04 | End: 2019-03-25 | Stop reason: SDUPTHER

## 2018-06-18 ENCOUNTER — OFFICE VISIT (OUTPATIENT)
Dept: CARDIOLOGY | Facility: CLINIC | Age: 68
End: 2018-06-18

## 2018-06-18 VITALS
SYSTOLIC BLOOD PRESSURE: 122 MMHG | DIASTOLIC BLOOD PRESSURE: 78 MMHG | WEIGHT: 217.6 LBS | HEART RATE: 62 BPM | BODY MASS INDEX: 37.15 KG/M2 | HEIGHT: 64 IN

## 2018-06-18 DIAGNOSIS — I48.19 PERSISTENT ATRIAL FIBRILLATION (HCC): Primary | ICD-10-CM

## 2018-06-18 DIAGNOSIS — I10 ESSENTIAL HYPERTENSION: ICD-10-CM

## 2018-06-18 PROCEDURE — 93000 ELECTROCARDIOGRAM COMPLETE: CPT | Performed by: NURSE PRACTITIONER

## 2018-06-18 PROCEDURE — 99213 OFFICE O/P EST LOW 20 MIN: CPT | Performed by: NURSE PRACTITIONER

## 2018-06-18 NOTE — PROGRESS NOTES
Chelsey BRI Sharon  1950  [phone]      01/15/2018    [facility] CARDIOLOGY     Everardo Mccormack MD  [address] [place] [id number]    Chief Complaint   Patient presents with   • Atrial Fibrillation       Problem List:   1. Persistent atrial fibrillation   A. Diagnosed 2016 with c/o extreme fatigue, SOB and palpitations   B. Previously treated with sotalol and flecainide with recurrence   C. Echocardiogram 10/20/16: EF 65%, mild TR   D. 11/10/16: BRETT showing EF 36-40%, mild MR, mild TR, ECV to NSR   E. 11/29/16: recurrence of atrial fibrillation, repeat ECV to NSR   F. S/p successful PVA 02/2017   G. 30-day event monitor 6/12-7/11 showing no recurrence of atrial fibrillation, average HR 72 bpm  2. Palpitations  3. Hypertension  4. Hypothyroidism    Allergies  Allergies   Allergen Reactions   • Accupril [Quinapril Hcl] Cough       Current Medications    Current Outpatient Prescriptions:   •  apixaban (ELIQUIS) 5 MG tablet tablet, Take 5 mg by mouth 2 (Two) Times a Day., Disp: , Rfl:   •  cyclobenzaprine (FLEXERIL) 10 MG tablet, Take 10 mg by mouth At Night As Needed for muscle spasms., Disp: , Rfl:   •  flecainide (TAMBOCOR) 50 MG tablet, Take 1 tablet by mouth Every 12 (Twelve) Hours., Disp: 180 tablet, Rfl: 3  •  furosemide (LASIX) 40 MG tablet, Take 40 mg by mouth Daily As Needed (swelling)., Disp: , Rfl:   •  HYDROcodone-acetaminophen (NORCO) 7.5-325 MG per tablet, Take 1 tablet by mouth Every 6 (Six) Hours As Needed for moderate pain (4-6)., Disp: , Rfl:   •  levothyroxine (SYNTHROID, LEVOTHROID) 25 MCG tablet, Take 25 mcg by mouth Daily., Disp: , Rfl: 0  •  melatonin 5 MG tablet tablet, Take 5 mg by mouth Every Night., Disp: , Rfl:   •  metoprolol tartrate (LOPRESSOR) 50 MG tablet, TAKE 1 TABLET TWICE A DAY, Disp: 60 tablet, Rfl: 11  •  potassium chloride (K-DUR,KLOR-CON) 20 MEQ CR tablet, Take 20 mEq by mouth As Needed (with lasix)., Disp: , Rfl:     HPI:  "Ms. Herrera is a 66 y/o female with the above noted past medical history who presents for follow up of persistent atrial fibrillation, s/p ablation 02/2017. She restarted Flecainide at last visit for recurrent atrial fibrillation. She has done well since. No recurrent symptomatic episodes. She is also on metoprolol. She has been on her Eliquis without bleeding issues or TIA/CVA symptoms. Denies CP, LH, dizziness, SOB, palpitations. No recent ED or Hospital visits. Reviewed recent labs from OSH with normal creatinine, TA's, TSH.     ROS:  General:  Denies fatigue, weight gain or loss  Cardiovascular:  Denies CP, PND, syncope, near syncope, edema or palpitations.  Pulmonary:  Denies MURILLO, cough, or wheezing      Vitals:    06/18/18 1005   BP: 122/78   BP Location: Left arm   Patient Position: Sitting   Pulse: 62   Weight: 98.7 kg (217 lb 9.6 oz)   Height: 162.6 cm (64\")     PE:  General: NAD  Neck: no JVD, no carotid bruits, no TM  Heart RRR, NL S1, S2, no rubs, murmurs  Lungs: CTA, no wheezes, rhonchi, or rales  Abd: soft, non-tender, + BS.  Ext: No musculoskeletal deformities, no edema, cyanosis, or clubbing  Psych: normal mood and affect    Diagnostic Data:        ECG 12 Lead  Date/Time: 6/18/2018 8:34 PM  Performed by: SUNDAY RODRIGUEZ  Authorized by: SUNDAY RODRIGUEZ   Comparison: compared with previous ECG from 1/15/2018  Rhythm: sinus rhythm  BPM: 62              1. Persistent atrial fibrillation    2. Essential hypertension          Plan:  1) Persistent atrial fibrillation:  - S/p PVA 02/2017  - If recurrences of Afib consider changing AAD vs redo Ablation but no changes for now.   - Continue flecainide 50 mg BID. QRS acceptable  - Continue anticoagulation with Eliquis.     2) HTN, controlled.   - Continue current medications.       F/up in 6 months    TYLER Sanders  10:40 AM  06/18/18                  "

## 2018-10-10 RX ORDER — FLECAINIDE ACETATE 50 MG/1
50 TABLET ORAL EVERY 12 HOURS SCHEDULED
Qty: 180 TABLET | Refills: 3 | Status: SHIPPED | OUTPATIENT
Start: 2018-10-10

## 2019-01-07 ENCOUNTER — OFFICE VISIT (OUTPATIENT)
Dept: CARDIOLOGY | Facility: CLINIC | Age: 69
End: 2019-01-07

## 2019-01-07 VITALS
HEIGHT: 64 IN | DIASTOLIC BLOOD PRESSURE: 78 MMHG | HEART RATE: 65 BPM | BODY MASS INDEX: 36.95 KG/M2 | SYSTOLIC BLOOD PRESSURE: 124 MMHG | WEIGHT: 216.4 LBS

## 2019-01-07 DIAGNOSIS — I48.19 PERSISTENT ATRIAL FIBRILLATION (HCC): Primary | ICD-10-CM

## 2019-01-07 PROCEDURE — 93000 ELECTROCARDIOGRAM COMPLETE: CPT | Performed by: INTERNAL MEDICINE

## 2019-01-07 PROCEDURE — 99213 OFFICE O/P EST LOW 20 MIN: CPT | Performed by: INTERNAL MEDICINE

## 2019-01-07 RX ORDER — AMOXICILLIN 500 MG/1
CAPSULE ORAL AS NEEDED
Refills: 0 | COMMUNITY
Start: 2019-01-06 | End: 2020-07-06

## 2019-01-07 NOTE — PROGRESS NOTES
Chelsey BRI Sharon  1950  [phone]      01/15/2018    [facility] CARDIOLOGY     Everardo Mccormack MD  [address] [facility]  [place] [id number]    Chief Complaint   Patient presents with   • Atrial Fibrillation       Problem List:   1. Persistent atrial fibrillation   A. Diagnosed 2016 with c/o extreme fatigue, SOB and palpitations   B. Previously treated with sotalol and flecainide with recurrence   C. Echocardiogram 10/20/16: EF 65%, mild TR   D. 11/10/16: BRETT showing EF 36-40%, mild MR, mild TR, ECV to NSR   E. 11/29/16: recurrence of atrial fibrillation, repeat ECV to NSR   F. S/p successful PVA 02/2017   G. 30-day event monitor 6/12-7/11 showing no recurrence of atrial fibrillation, average HR 72 bpm  2. Palpitations  3. Hypertension  4. Hypothyroidism    Allergies  Allergies   Allergen Reactions   • Accupril [Quinapril Hcl] Cough       Current Medications    Current Outpatient Medications:   •  amoxicillin (AMOXIL) 500 MG capsule, As Needed. As needed  before dental appointment, Disp: , Rfl: 0  •  apixaban (ELIQUIS) 5 MG tablet tablet, Take 5 mg by mouth 2 (Two) Times a Day., Disp: , Rfl:   •  cyclobenzaprine (FLEXERIL) 10 MG tablet, Take 10 mg by mouth At Night As Needed for muscle spasms., Disp: , Rfl:   •  flecainide (TAMBOCOR) 50 MG tablet, TAKE 1 TABLET BY MOUTH EVERY 12 (TWELVE) HOURS., Disp: 180 tablet, Rfl: 3  •  furosemide (LASIX) 40 MG tablet, Take 40 mg by mouth Daily As Needed (swelling)., Disp: , Rfl:   •  HYDROcodone-acetaminophen (NORCO) 7.5-325 MG per tablet, Take 1 tablet by mouth Every 6 (Six) Hours As Needed for moderate pain (4-6)., Disp: , Rfl:   •  levothyroxine (SYNTHROID, LEVOTHROID) 25 MCG tablet, Take 25 mcg by mouth Daily., Disp: , Rfl: 0  •  melatonin 5 MG tablet tablet, Take 5 mg by mouth Every Night., Disp: , Rfl:   •  metoprolol tartrate (LOPRESSOR) 50 MG tablet, TAKE 1 TABLET TWICE A DAY, Disp: 60 tablet, Rfl: 11  •  potassium chloride  "(K-DUR,KLOR-CON) 20 MEQ CR tablet, Take 20 mEq by mouth As Needed (with lasix)., Disp: , Rfl:     History of Present Illness   HPI    Pt presents for follow up of persistent atrial fibrillation s/p ablation 02/2017. Since we saw her in August, she was taken off of her flecainide.  She was doing well for about 3 weeks after PVA off the flecainide when she developed atrial fibrillation that lasted about 2 days.  She took a flecainide the first day and another flecainide the next day and has been back on her scheduled dose since then.  Episode lasted 2 days, has had no atrial fibrillation since then.  She has been on her Eliquis without bleeding issues or TIA/CVA symptoms.  Symptoms with afib include palpitations, fatigue and shortness of breath. On Flecainide with no recurrences of Afib.  She otherwise denies any c/o SOB, CP, LH, and dizziness. Denies any hospitalizations or ER visits. Overall feels well.    ROS:  General:  Denies fatigue, weight gain or loss  Cardiovascular:  Denies CP, PND, syncope, near syncope, edema or palpitations.  Pulmonary:  Denies MURILLO, cough, or wheezing      Vitals:    01/07/19 1426   BP: 124/78   BP Location: Left arm   Patient Position: Sitting   Pulse: 65   Weight: 98.2 kg (216 lb 6.4 oz)   Height: 162.6 cm (64\")     PE:  General: NAD  Neck: no JVD, no carotid bruits, no TM  Heart RRR, NL S1, S2, S4 present, no rubs, murmurs  Lungs: CTA, no wheezes, rhonchi, or rales  Abd: soft, non-tender, NL BS  Ext: No musculoskeletal deformities, no edema, cyanosis, or clubbing  Psych: normal mood and affect    Diagnostic Data:        ECG 12 Lead  Date/Time: 1/7/2019 2:38 PM  Performed by: Miguelito Owusu DO  Authorized by: Miguelito Owusu DO   Rhythm: sinus rhythm  Comments: QRS 76 msec.   HR 65 bpm            1. Persistent atrial fibrillation (CMS/HCC)          Plan:  1) Persistent atrial fibrillation:  - S/p PVA 02/2017  - 2 day episode of atrial fibrillation in September after stopping " flecianide for 3 weeks, no recurrence after restarting her flecainide. Doing well at this well. If recurrences of Afib consider changing AAD vs redo Ablation but no changes for now.   - Continue flecainide 50 mg BID with QRS and QTc. No Afib since restarting on the flecainide.   2) Anticoagulation  - We will continue Eliquis at this time given recurrent atrial fibrillation off flecainide  3) HTN   - Well controlled on current medications  - Wt loss, exercise, salt reduction    Will follow up with Dr Sinclair for now and if recurrences of Afib then will come back to see EP at that time. This is what patient wants to do since I will be leaving in about 5 weeks.     CC: Migel Owusu,   2:40 PM  01/07/19

## 2019-03-25 RX ORDER — METOPROLOL TARTRATE 50 MG/1
50 TABLET, FILM COATED ORAL 2 TIMES DAILY
Qty: 60 TABLET | Refills: 5 | Status: SHIPPED | OUTPATIENT
Start: 2019-03-25 | End: 2019-03-28 | Stop reason: SDUPTHER

## 2019-03-28 RX ORDER — METOPROLOL TARTRATE 50 MG/1
50 TABLET, FILM COATED ORAL 2 TIMES DAILY
Qty: 60 TABLET | Refills: 5 | Status: SHIPPED | OUTPATIENT
Start: 2019-03-28 | End: 2019-07-19 | Stop reason: SDUPTHER

## 2019-03-28 RX ORDER — METOPROLOL TARTRATE 50 MG/1
50 TABLET, FILM COATED ORAL 2 TIMES DAILY
Qty: 60 TABLET | Refills: 5 | Status: SHIPPED | OUTPATIENT
Start: 2019-03-28 | End: 2019-03-28 | Stop reason: SDUPTHER

## 2019-07-19 RX ORDER — METOPROLOL TARTRATE 50 MG/1
TABLET, FILM COATED ORAL
Qty: 180 TABLET | Refills: 0 | Status: SHIPPED | OUTPATIENT
Start: 2019-07-19

## 2020-05-06 ENCOUNTER — HOSPITAL ENCOUNTER (OUTPATIENT)
Age: 70
End: 2020-05-06
Payer: MEDICARE

## 2020-05-06 DIAGNOSIS — Z12.31: Primary | ICD-10-CM

## 2020-05-06 PROCEDURE — 77067 SCR MAMMO BI INCL CAD: CPT

## 2020-05-06 PROCEDURE — 77063 BREAST TOMOSYNTHESIS BI: CPT

## 2020-05-29 ENCOUNTER — HOSPITAL ENCOUNTER (OUTPATIENT)
Age: 70
End: 2020-05-29
Payer: MEDICARE

## 2020-05-29 DIAGNOSIS — M54.31: ICD-10-CM

## 2020-05-29 DIAGNOSIS — M70.61: Primary | ICD-10-CM

## 2020-05-29 PROCEDURE — 72110 X-RAY EXAM L-2 SPINE 4/>VWS: CPT

## 2020-05-29 PROCEDURE — 73502 X-RAY EXAM HIP UNI 2-3 VIEWS: CPT

## 2020-05-29 PROCEDURE — 72202 X-RAY EXAM SI JOINTS 3/> VWS: CPT

## 2020-07-06 ENCOUNTER — OFFICE VISIT (OUTPATIENT)
Dept: ORTHOPEDIC SURGERY | Facility: CLINIC | Age: 70
End: 2020-07-06

## 2020-07-06 VITALS — OXYGEN SATURATION: 98 % | HEIGHT: 64 IN | BODY MASS INDEX: 36.19 KG/M2 | WEIGHT: 212 LBS | HEART RATE: 72 BPM

## 2020-07-06 DIAGNOSIS — M16.11 PRIMARY OSTEOARTHRITIS OF RIGHT HIP: Primary | ICD-10-CM

## 2020-07-06 PROCEDURE — 99203 OFFICE O/P NEW LOW 30 MIN: CPT | Performed by: ORTHOPAEDIC SURGERY

## 2020-07-06 RX ORDER — MELATONIN
1000 DAILY
COMMUNITY
End: 2021-05-27

## 2020-07-06 RX ORDER — LEVOTHYROXINE SODIUM 0.05 MG/1
50 TABLET ORAL DAILY
COMMUNITY
Start: 2020-05-24

## 2020-07-06 NOTE — PROGRESS NOTES
[facility] Note    Subjective     Chief Complaint   Patient presents with   • Right Hip - Pain        HPI    Chelsey Herrera is a 69 y.o. female who presents with right hip pain.  Onset: atraumatic and gradual in nature. The issue has been ongoing for 2 month(s). Pain is a 6/10 on the pain scale. Pain is described as aching and burning. Associated symptoms include pain. The pain is worse with walking, standing, sitting and climbing stairs; resting and pain medication and/or NSAID improve the pain. Previous treatments have included: NSAIDS.  Pain is located in the groin region, but she also has posterior pain and calf pain.  She has a known history of back problems.    I have reviewed the following portions of the patient's history:History of Present Illness    Patient Active Problem List   Diagnosis   • Persistent atrial fibrillation (CMS/HCC)   • Hypertension   • Obesity, Class II, BMI 35-39.9   • Fibromyalgia   • Nephrolithiasis   • History of surgery     Past Medical History:   Diagnosis Date   • Arrhythmia    • Arthritis    • Atrial fibrillation (CMS/HCC)     CHADS-VASc = 3   • Back pain    • Bilateral kidney stones    • Cough    • Dental bridge present    • Fibromyalgia    • Hypertension    • Muscle weakness (generalized)    • Palpitations    • Persistent atrial fibrillation (CMS/HCC) 1/23/2017    A) Diagnosed 2016 by PCP when presenting with extreme fatigue B) CHADS-VASc = 3 (age, female, HTN)  C) 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 Changed to flecainide. F) ECV 11/29/16 on flecainide. Recurrence of afib about 24hrs later. F) TTE 10/20/16 showing EF of 65%, mild TR, LA 3.97cm   G) MPS 2/12/15: EF 63%, normal perfusion & wall motion   • Persistent atrial fibrillation (CMS/HCC) 1/23/2017    A) Diagnosed 2016 by PCP when presenting with  extreme fatigue B) CHADS-VASc = 3 (age, female, HTN)  C) 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 Changed to flecainide. F) ECV 11/29/16 on flecainide. Recurrence of afib about 24hrs later. F) TTE 10/20/16 bin   • PONV (postoperative nausea and vomiting)    • SOB (shortness of breath)    • Urination frequency    • Wears eyeglasses     reading      Past Surgical History:   Procedure Laterality Date   • BARIATRIC SURGERY      gastric bypass   • CARDIAC CATHETERIZATION     • CARDIAC ELECTROPHYSIOLOGY PROCEDURE N/A 2/22/2017    Procedure: PVA. DNS meds.;  Surgeon: Miguelito Owusu DO;  Location: [facility] INVASIVE LOC[facility];  Service:    • CARDIOVERSION      X 2   • CARPAL TUNNEL RELEASE Bilateral    • COLONOSCOPY      5 years ago   • GALLBLADDER SURGERY     • OTHER SURGICAL HISTORY      uterus lift/repair   • REPLACEMENT TOTAL KNEE BILATERAL Bilateral    • SINUS SURGERY      X 2      Family History   Problem Relation Age of Onset   • Heart attack Mother    • Heart attack Father    • Heart disease Brother    • Diabetes Brother    • Kidney disease Brother      Social History     Socioeconomic History   • Marital status:      Spouse name: Not on file   • Number of children: Not on file   • Years of education: Not on file   • Highest education level: Not on file   Tobacco Use   • Smoking status: Never Smoker   • Smokeless tobacco: Never Used   Substance and Sexual Activity   • Alcohol use: No   • Drug use: No   • Sexual activity: Defer   Social History Narrative    Patient consumes 2  servings coffee daily.     Patient lives at home with , Yash.           Current Outpatient Medications on File Prior to Visit   Medication Sig Dispense Refill   • apixaban (ELIQUIS) 5 MG tablet tablet Take 5 mg by mouth 2 (Two) Times a Day.     • cholecalciferol (VITAMIN D3) 25 MCG (1000  UT) tablet Take 1,000 Units by mouth Daily.     • cyclobenzaprine (FLEXERIL) 10 MG tablet Take 10 mg by mouth At Night As Needed for muscle spasms.     • diclofenac (VOLTAREN) 1 % gel gel COLLIN EXT AA QID UTD     • flecainide (TAMBOCOR) 50 MG tablet TAKE 1 TABLET BY MOUTH EVERY 12 (TWELVE) HOURS. 180 tablet 3   • furosemide (LASIX) 40 MG tablet Take 40 mg by mouth Daily As Needed (swelling).     • HYDROcodone-acetaminophen (NORCO) 7.5-325 MG per tablet Take 1 tablet by mouth Every 6 (Six) Hours As Needed for moderate pain (4-6).     • levothyroxine (SYNTHROID, LEVOTHROID) 50 MCG tablet Take 50 mcg by mouth Daily.     • metoprolol tartrate (LOPRESSOR) 50 MG tablet TAKE 1 TABLET BY MOUTH TWICE A  tablet 0   • potassium chloride (K-DUR,KLOR-CON) 20 MEQ CR tablet Take 20 mEq by mouth As Needed (with lasix).     • [DISCONTINUED] amoxicillin (AMOXIL) 500 MG capsule As Needed. As needed  before dental appointment  0   • [DISCONTINUED] levothyroxine (SYNTHROID, LEVOTHROID) 25 MCG tablet Take 25 mcg by mouth Daily.  0   • [DISCONTINUED] melatonin 5 MG tablet tablet Take 5 mg by mouth Every Night.       No current facility-administered medications on file prior to visit.       Allergies   Allergen Reactions   • Accupril [Quinapril Hcl] Cough   • Cephalexin Itching   • Ciprofloxacin Itching   • Triamcinolone Other (See Comments)     Burning sensation        Review of Systems   Constitutional: Positive for activity change and fatigue.   HENT: Negative.    Eyes: Negative.    Respiratory: Negative.    Cardiovascular: Positive for leg swelling.   Gastrointestinal: Negative.    Endocrine: Negative.    Genitourinary: Negative.    Musculoskeletal: Positive for arthralgias, back pain and joint swelling.   Skin: Negative.    Allergic/Immunologic: Negative.    Neurological: Negative.    Hematological: Negative.    Psychiatric/Behavioral: Positive for sleep disturbance.        Objective      Physical Exam  Pulse 72   Ht 162.6 cm  "(64.02\")   Wt 96.2 kg (212 lb)   SpO2 98%   BMI 36.37 kg/m²     Body mass index is 36.37 kg/m².    General:   Mental Status:  Alert   Appearance: Cooperative, in no acute distress   Build and Nutrition: Overweight female   Orientation: Alert and oriented to person, place and time   Posture: Normal   Gait: Normal    Integument:   Right hip: No skin lesions, no rash, no ecchymosis    Neurologic:   Sensation:    Right foot: Intact to light touch on the dorsal and plantar aspect   Motor:  Right lower extremity: 5/5 quadriceps, hamstrings, ankle dorsiflexors, and ankle plantar flexors    Vascular:   Right lower extremity: 2+ dorsalis pedis pulse, prompt capillary refill    Lower Extremities:   Right Hip:    Tenderness:  Mild lateral tenderness    Swelling: None    Crepitus:  None    Atrophy:  None    Range of motion:  External Rotation: 30°       Internal Rotation: 30°       Flexion:  100°       Extension:  0°   Instability:  None  Deformities:  None  Functional testing: Positive Stinchfield    No leg length discrepancy      Imaging/Studies    Outside radiographs from [facility] from 5/29/2020 of the right hip were reviewed, which showed significant joint space narrowing, with near bone-on-bone contact, with osteophyte formation on the femoral head, and no acute bony abnormalities.    Assessment and Plan     Chelsey was seen today for pain.    Diagnoses and all orders for this visit:    Primary osteoarthritis of right hip  -     External Facility Surgical/Procedural Request; Future        1. Primary osteoarthritis of right hip        I reviewed my findings with patient today.  She has right hip arthritis, we discussed treatment options.  She would like to start conservatively, and I offered her an intra-articular hip injection under fluoroscopic guidance for both diagnostic and therapeutic purposes, and she does have back issues that may be contributing to her symptoms.  We will set that up at a " mutually convenient time.    Return in about 4 weeks (around 8/3/2020) for After Hip Injection.    Medical Decision Making  Management Options : prescription/IM medicine  Data/Risk: independent visualization of imaging, lab tests, or EMG/NCV      Silas Ravi MD  07/06/20  13:55    Dragon disclaimer:  Much of this encounter note is an electronic transcription/translation of spoken language to printed text. The electronic translation of spoken language may permit erroneous, or at times, nonsensical words or phrases to be inadvertently transcribed; Although I have reviewed the note for such errors, some may still exist.

## 2020-08-06 ENCOUNTER — TELEPHONE (OUTPATIENT)
Dept: ORTHOPEDIC SURGERY | Facility: CLINIC | Age: 70
End: 2020-08-06

## 2020-08-06 NOTE — TELEPHONE ENCOUNTER
----- Message from Silas Ravi MD sent at 8/5/2020  5:41 PM EDT -----  Okay thanks.    ----- Message -----  From: Tracy Skinner  Sent: 8/5/2020   3:05 PM EDT  To: Silas Ravi MD    I RECEIVED A CLEARANCE FOR PATIENT TO STOP HER ELIQUIS 2 DAYS PRIOR TO PROCEDURE.  ----- Message -----  From: Silas Ravi MD  Sent: 7/10/2020  10:56 AM EDT  To: Tracy Skinner    Yes, she will need to come off the Eliquis before the injection.  She will need clearance to come off of that by her cardiologist.    ----- Message -----  From: Tracy Skinner  Sent: 7/9/2020   2:15 PM EDT  To: Silas Ravi MD    PATIENT IS ON ELIQUIS/DOES SHE NEED TO STOP PRIOR TO INJECTION?    TXS

## 2020-08-11 ENCOUNTER — APPOINTMENT (OUTPATIENT)
Dept: PREADMISSION TESTING | Facility: HOSPITAL | Age: 70
End: 2020-08-11

## 2020-08-11 PROCEDURE — U0004 COV-19 TEST NON-CDC HGH THRU: HCPCS

## 2020-08-11 PROCEDURE — U0002 COVID-19 LAB TEST NON-CDC: HCPCS

## 2020-08-11 PROCEDURE — C9803 HOPD COVID-19 SPEC COLLECT: HCPCS

## 2020-08-12 LAB
REF LAB TEST METHOD: NORMAL
SARS-COV-2 RNA RESP QL NAA+PROBE: NOT DETECTED

## 2020-08-14 ENCOUNTER — OUTSIDE FACILITY SERVICE (OUTPATIENT)
Dept: ORTHOPEDIC SURGERY | Facility: CLINIC | Age: 70
End: 2020-08-14

## 2020-08-14 PROCEDURE — 77002 NEEDLE LOCALIZATION BY XRAY: CPT | Performed by: ORTHOPAEDIC SURGERY

## 2020-08-14 PROCEDURE — 20610 DRAIN/INJ JOINT/BURSA W/O US: CPT | Performed by: ORTHOPAEDIC SURGERY

## 2020-09-14 ENCOUNTER — OFFICE VISIT (OUTPATIENT)
Dept: ORTHOPEDIC SURGERY | Facility: CLINIC | Age: 70
End: 2020-09-14

## 2020-09-14 VITALS — HEIGHT: 64 IN | OXYGEN SATURATION: 98 % | WEIGHT: 210.4 LBS | HEART RATE: 67 BPM | BODY MASS INDEX: 35.92 KG/M2

## 2020-09-14 DIAGNOSIS — M16.11 PRIMARY OSTEOARTHRITIS OF RIGHT HIP: Primary | ICD-10-CM

## 2020-09-14 PROCEDURE — 99212 OFFICE O/P EST SF 10 MIN: CPT | Performed by: ORTHOPAEDIC SURGERY

## 2020-09-14 NOTE — PROGRESS NOTES
[facility] [place] [place]    Subjective     Chief Complaint   Patient presents with   • Follow-up     1 month follow up for Right hip intra-artcular injection 8-14-20        HPI    It has been 1  month(s) since Ms. Herrera's last visit. She returns to clinic today for follow-up of right hip arthritis. She rates her pain a 1/10 on the pain scale The pain is worse with standing and sitting; resting, heat and pain medication and/or NSAID improve the pain. Overall, she is doing better.  Injection helped dramatically for her hip pain.  Ambulatory without external aids.    I have reviewed the following portions of the patient's history and agree with: History of Present Illness and Review of Systems    Patient Active Problem List   Diagnosis   • Persistent atrial fibrillation (CMS/HCC)   • Hypertension   • Obesity, Class II, BMI 35-39.9   • Fibromyalgia   • Nephrolithiasis   • History of surgery     Past Medical History:   Diagnosis Date   • Arrhythmia    • Arthritis    • Atrial fibrillation (CMS/HCC)     CHADS-VASc = 3   • Back pain    • Bilateral kidney stones    • Cough    • Dental bridge present    • Fibromyalgia    • Hypertension    • Muscle weakness (generalized)    • Palpitations    • Persistent atrial fibrillation (CMS/HCC) 1/23/2017    A) Diagnosed 2016 by PCP when presenting with extreme fatigue B) CHADS-VASc = 3 (age, female, HTN)  C) 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 Changed to flecainide. F) ECV 11/29/16 on flecainide. Recurrence of afib about 24hrs later. F) TTE 10/20/16 showing EF of 65%, mild TR, LA 3.97cm   G) MPS 2/12/15: EF 63%, normal perfusion & wall motion   • Persistent atrial fibrillation (CMS/HCC) 1/23/2017    A) Diagnosed 2016 by PCP when presenting with extreme fatigue B) CHADS-VASc = 3 (age, female, HTN)  C) Failure of Flecainide  and Sotalol with recurrent afib  D) BRETT 11/10/2016 w/ EF 36-40%, normal sized LA, mild MR & TR  E) ECV 11/10/16 on sotalol with early recurrence of afib (minutes). Changed to flecainide. F) ECV 11/29/16 on flecainide. Recurrence of afib about 24hrs later. F) TTE 10/20/16 bin   • PONV (postoperative nausea and vomiting)    • SOB (shortness of breath)    • Urination frequency    • Wears eyeglasses     reading      Past Surgical History:   Procedure Laterality Date   • BARIATRIC SURGERY      gastric bypass   • CARDIAC CATHETERIZATION     • CARDIAC ELECTROPHYSIOLOGY PROCEDURE N/A 2/22/2017    Procedure: PVA. DNS meds.;  Surgeon: Miguelito Owusu DO;  Location: [facility] INVASIVE LOCATION;  Service:    • CARDIOVERSION      X 2   • CARPAL TUNNEL RELEASE Bilateral    • COLONOSCOPY      5 years ago   • GALLBLADDER SURGERY     • OTHER SURGICAL HISTORY      uterus lift/repair   • REPLACEMENT TOTAL KNEE BILATERAL Bilateral    • SINUS SURGERY      X 2      Family History   Problem Relation Age of Onset   • Heart attack Mother    • Heart attack Father    • Heart disease Brother    • Diabetes Brother    • Kidney disease Brother      Social History     Socioeconomic History   • Marital status:      Spouse name: Not on file   • Number of children: Not on file   • Years of education: Not on file   • Highest education level: Not on file   Tobacco Use   • Smoking status: Never Smoker   • Smokeless tobacco: Never Used   Substance and Sexual Activity   • Alcohol use: No   • Drug use: No   • Sexual activity: Defer   Social History Narrative    Patient consumes 2  servings coffee daily.     Patient lives at home with , Yash.           Current Outpatient Medications on File Prior to Visit   Medication Sig Dispense Refill   • apixaban (ELIQUIS) 5 MG tablet tablet Take 5 mg by mouth 2 (Two) Times a Day.     • cholecalciferol (VITAMIN D3) 25 MCG (1000 UT) tablet Take 1,000 Units by mouth Daily.     • cyclobenzaprine (FLEXERIL)  "10 MG tablet Take 10 mg by mouth At Night As Needed for muscle spasms.     • diclofenac (VOLTAREN) 1 % gel gel COLLIN EXT AA QID UTD     • flecainide (TAMBOCOR) 50 MG tablet TAKE 1 TABLET BY MOUTH EVERY 12 (TWELVE) HOURS. 180 tablet 3   • furosemide (LASIX) 40 MG tablet Take 40 mg by mouth Daily As Needed (swelling).     • HYDROcodone-acetaminophen (NORCO) 7.5-325 MG per tablet Take 1 tablet by mouth Every 6 (Six) Hours As Needed for moderate pain (4-6).     • levothyroxine (SYNTHROID, LEVOTHROID) 50 MCG tablet Take 50 mcg by mouth Daily.     • metoprolol tartrate (LOPRESSOR) 50 MG tablet TAKE 1 TABLET BY MOUTH TWICE A  tablet 0   • potassium chloride (K-DUR,KLOR-CON) 20 MEQ CR tablet Take 20 mEq by mouth As Needed (with lasix).       No current facility-administered medications on file prior to visit.       Allergies   Allergen Reactions   • Accupril [Quinapril Hcl] Cough   • Cephalexin Itching   • Ciprofloxacin Itching   • Triamcinolone Other (See Comments)     Burning sensation        Review of Systems   Constitutional: Negative.    HENT: Negative.    Eyes: Negative.    Respiratory: Negative.    Cardiovascular: Negative.    Gastrointestinal: Negative.    Endocrine: Negative.    Genitourinary: Negative.    Musculoskeletal: Positive for arthralgias and back pain.   Skin: Negative.    Allergic/Immunologic: Negative.    Neurological: Negative.    Hematological: Negative.    Psychiatric/Behavioral: Negative.         Objective      Physical Exam  Pulse 67   Ht 162.6 cm (64.02\")   Wt 95.4 kg (210 lb 6.4 oz)   SpO2 98%   BMI 36.10 kg/m²     Body mass index is 36.1 kg/m².    General:   Mental Status:  Alert   Appearance: Cooperative, in no acute distress   Build and Nutrition: Overweight female   Orientation: Alert and oriented to person, place and time   Posture: Normal   Gait: Normal    Integument:   Right hip: No skin lesions, no rash, no ecchymosis    Lower Extremity:   Right Hip:    Tenderness: "  None    Swelling:  None    Crepitus:  None    Range of motion:  External Rotation: 30°       Internal Rotation: 30°       Flexion:  100°       Extension:  0°    Deformities:  None  Functional testing: Negative Stinchfield    No leg length discrepancy        Assessment and Plan     Chelsey was seen today for follow-up.    Diagnoses and all orders for this visit:    Primary osteoarthritis of right hip        1. Primary osteoarthritis of right hip        I reviewed my findings with the patient today.  Her right hip is doing well following the intra-articular hip injection, and I will see her back in 6 months, but sooner for any problems.  Repeat injection could be considered in the future, as she is hoping to avoid surgical intervention.  However, long-term she may be a candidate for hip replacement surgery.    Return in about 6 months (around 3/14/2021).        Silas Ravi MD  09/14/20  11:03 VÍCTORT    Dragon disclaimer:  Much of this encounter note is an electronic transcription/translation of spoken language to printed text. The electronic translation of spoken language may permit erroneous, or at times, nonsensical words or phrases to be inadvertently transcribed; Although I have reviewed the note for such errors, some may still exist.

## 2021-04-05 ENCOUNTER — OFFICE VISIT (OUTPATIENT)
Dept: ORTHOPEDIC SURGERY | Facility: CLINIC | Age: 71
End: 2021-04-05

## 2021-04-05 VITALS
DIASTOLIC BLOOD PRESSURE: 91 MMHG | HEART RATE: 65 BPM | HEIGHT: 64 IN | WEIGHT: 216.2 LBS | BODY MASS INDEX: 36.91 KG/M2 | SYSTOLIC BLOOD PRESSURE: 184 MMHG

## 2021-04-05 DIAGNOSIS — M16.11 PRIMARY OSTEOARTHRITIS OF RIGHT HIP: Primary | ICD-10-CM

## 2021-04-05 PROCEDURE — 99214 OFFICE O/P EST MOD 30 MIN: CPT | Performed by: ORTHOPAEDIC SURGERY

## 2021-04-05 RX ORDER — PREGABALIN 150 MG/1
150 CAPSULE ORAL ONCE
Status: CANCELLED | OUTPATIENT
Start: 2021-04-05 | End: 2021-04-05

## 2021-04-05 RX ORDER — ACETAMINOPHEN 325 MG/1
1000 TABLET ORAL ONCE
Status: CANCELLED | OUTPATIENT
Start: 2021-04-05 | End: 2021-04-05

## 2021-04-05 RX ORDER — MELOXICAM 7.5 MG/1
15 TABLET ORAL ONCE
Status: CANCELLED | OUTPATIENT
Start: 2021-04-05 | End: 2021-04-05

## 2021-04-05 RX ORDER — METHOCARBAMOL 750 MG/1
TABLET ORAL
COMMUNITY
Start: 2021-03-31 | End: 2021-05-27

## 2021-04-05 NOTE — PROGRESS NOTES
[facility] [place] [facility] Note    Subjective     Chief Complaint   Patient presents with   • Follow-up     6.5 month follow up - Primary osteoarthritis of right hip         HPI    Chelsey Herrera is a 70 y.o. female who follows up for right hip pain.    She had a previous intra-articular hip injection on 08/14/2020 and was last seen by me in 09/2020. The patient states that she has pain in her buttock area and the pain is worst when she starts walking after being seated or lying down, but improves after she is in motion for a while. She is interested in a hip replacement. She has a history of atrial fibrillation so she is on Eliquis. She is followed by Dr. Sinclair for cardiology.     I have reviewed the following portions of the patient's history and agree with: History of Present Illness and Review of Systems    Patient Active Problem List   Diagnosis   • Persistent atrial fibrillation (CMS/HCC)   • Hypertension   • Obesity, Class II, BMI 35-39.9   • Fibromyalgia   • Nephrolithiasis   • History of surgery   • Primary osteoarthritis of right hip     Past Medical History:   Diagnosis Date   • Arrhythmia    • Arthritis    • Atrial fibrillation (CMS/HCC)     CHADS-VASc = 3   • Back pain    • Bilateral kidney stones    • Cough    • Dental bridge present    • Fibromyalgia    • Hypertension    • Muscle weakness (generalized)    • Palpitations    • Persistent atrial fibrillation (CMS/HCC) 1/23/2017    A) Diagnosed 2016 by PCP when presenting with extreme fatigue B) CHADS-VASc = 3 (age, female, HTN)  C) 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 Changed to flecainide. F) ECV 11/29/16 on flecainide. Recurrence of afib about 24hrs later. F) TTE 10/20/16 showing EF of 65%, mild TR, LA 3.97cm   G) MPS 2/12/15: EF 63%, normal perfusion & wall motion   • Persistent atrial  fibrillation (CMS/HCC) 1/23/2017    A) Diagnosed 2016 by PCP when presenting with extreme fatigue B) CHADS-VASc = 3 (age, female, HTN)  C) 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 Changed to flecainide. F) ECV 11/29/16 on flecainide. Recurrence of afib about 24hrs later. F) TTE 10/20/16 bin   • PONV (postoperative nausea and vomiting)    • SOB (shortness of breath)    • Urination frequency    • Wears eyeglasses     reading      Past Surgical History:   Procedure Laterality Date   • BARIATRIC SURGERY      gastric bypass   • CARDIAC CATHETERIZATION     • CARDIAC ELECTROPHYSIOLOGY PROCEDURE N/A 2/22/2017    Procedure: PVA. DNS meds.;  Surgeon: Miguelito Owusu DO;  Location: [facility] INVASIVE LOCATION;  Service:    • CARDIOVERSION      X 2   • CARPAL TUNNEL RELEASE Bilateral    • COLONOSCOPY      5 years ago   • GALLBLADDER SURGERY     • OTHER SURGICAL HISTORY      uterus lift/repair   • REPLACEMENT TOTAL KNEE BILATERAL Bilateral    • SINUS SURGERY      X 2      Family History   Problem Relation Age of Onset   • Heart attack Mother    • Heart attack Father    • Heart disease Brother    • Diabetes Brother    • Kidney disease Brother      Social History     Socioeconomic History   • Marital status:      Spouse name: Not on file   • Number of children: Not on file   • Years of education: Not on file   • Highest education level: Not on file   Tobacco Use   • Smoking status: Never Smoker   • Smokeless tobacco: Never Used   Substance and Sexual Activity   • Alcohol use: No   • Drug use: No   • Sexual activity: Defer      Current Outpatient Medications on File Prior to Visit   Medication Sig Dispense Refill   • apixaban (ELIQUIS) 5 MG tablet tablet Take 5 mg by mouth 2 (Two) Times a Day.     • cholecalciferol (VITAMIN D3) 25 MCG (1000 UT) tablet Take 1,000 Units by mouth  Daily.     • cyclobenzaprine (FLEXERIL) 10 MG tablet Take 10 mg by mouth At Night As Needed for muscle spasms.     • D3-50 1.25 MG (77205 UT) capsule TAKE 1 CAPSULE BY MOUTH TWICE WEEKLY ON SAME DAYS EACH WEEK FOR 12 WEEKS     • diclofenac (VOLTAREN) 1 % gel gel COLLIN EXT AA QID UTD     • flecainide (TAMBOCOR) 50 MG tablet TAKE 1 TABLET BY MOUTH EVERY 12 (TWELVE) HOURS. 180 tablet 3   • furosemide (LASIX) 40 MG tablet Take 40 mg by mouth Daily As Needed (swelling).     • HYDROcodone-acetaminophen (NORCO) 7.5-325 MG per tablet Take 1 tablet by mouth Every 6 (Six) Hours As Needed for moderate pain (4-6).     • levothyroxine (SYNTHROID, LEVOTHROID) 50 MCG tablet Take 50 mcg by mouth Daily.     • metoprolol tartrate (LOPRESSOR) 50 MG tablet TAKE 1 TABLET BY MOUTH TWICE A  tablet 0   • potassium chloride (K-DUR,KLOR-CON) 20 MEQ CR tablet Take 20 mEq by mouth As Needed (with lasix).       No current facility-administered medications on file prior to visit.      Allergies   Allergen Reactions   • Accupril [Quinapril Hcl] Cough   • Cephalexin Itching   • Ciprofloxacin Itching   • Triamcinolone Other (See Comments)     Burning sensation        Review of Systems   Constitutional: Negative for activity change, appetite change, chills, diaphoresis, fatigue, fever and unexpected weight change.   HENT: Negative for congestion, dental problem, drooling, ear discharge, ear pain, facial swelling, hearing loss, mouth sores, nosebleeds, postnasal drip, rhinorrhea, sinus pressure, sneezing, sore throat, tinnitus, trouble swallowing and voice change.    Eyes: Negative for photophobia, pain, discharge, redness, itching and visual disturbance.   Respiratory: Negative for apnea, cough, choking, chest tightness, shortness of breath, wheezing and stridor.    Cardiovascular: Negative for chest pain, palpitations and leg swelling.   Gastrointestinal: Negative for abdominal distention, abdominal pain, anal bleeding, blood in stool,  "constipation, diarrhea, nausea, rectal pain and vomiting.   Endocrine: Negative for cold intolerance, heat intolerance, polydipsia, polyphagia and polyuria.   Genitourinary: Negative for decreased urine volume, difficulty urinating, dysuria, enuresis, flank pain, frequency, genital sores, hematuria and urgency.   Musculoskeletal: Positive for back pain. Negative for arthralgias, gait problem, joint swelling, myalgias, neck pain and neck stiffness.   Skin: Negative for color change, pallor, rash and wound.   Allergic/Immunologic: Negative for environmental allergies, food allergies and immunocompromised state.   Neurological: Negative for dizziness, tremors, seizures, syncope, facial asymmetry, speech difficulty, weakness, light-headedness, numbness and headaches.   Hematological: Negative for adenopathy. Does not bruise/bleed easily.   Psychiatric/Behavioral: Positive for sleep disturbance. Negative for agitation, behavioral problems, confusion, decreased concentration, dysphoric mood, hallucinations, self-injury and suicidal ideas. The patient is not nervous/anxious and is not hyperactive.         Objective      Physical Exam  BP (!) 184/91   Pulse 65   Ht 162.6 cm (64.02\")   Wt 98.1 kg (216 lb 3.2 oz)   BMI 37.09 kg/m²     Body mass index is 37.09 kg/m².    General:   Mental Status:  Alert   Appearance: Cooperative, in no acute distress   Build and Nutrition: Obese female   Orientation: Alert and oriented to person, place and time   Posture: Normal   Gait: Normal     Integument       • Right hip: No skin lesions, rash, or ecchymosis.    Lower Extremities  • Right Hip:        • Tenderness: None       • Swelling: None       • Crepitus: None       • Range of motion:             • External rotation: 30°             • Internal rotation: 10° with pain             • Flexion: 100°             • Extension: 0°       • Deformities: None       • Functional Testing:             • Stinchfield Test: Negative             • " Her right lower extremity is slightly shorter than her left.    Imaging/Studies  Imaging Results (Last 24 Hours)     Procedure Component Value Units Date/Time    XR Hip With or Without Pelvis 2 - 3 View Right [[id number]] Resulted: 04/05/21 1603     Updated: 04/05/21 1604    Narrative:      Right Hip Radiographs  Indication: right hip pain  Views: low AP pelvis and lateral of the right hip    Comparison: Outside imaging from 5/29/2020    Findings:   Advanced arthritic changes, with bone-on-bone contact, osteophyte   formation on both the femoral and acetabular aspects, with no obvious   acute bony abnormalities.  Worsening compared to the previous imaging.            Assessment and Plan     Diagnoses and all orders for this visit:    1. Primary osteoarthritis of right hip (Primary)  -     XR Hip With or Without Pelvis 2 - 3 View Right  -     Case Request; Standing  -     Instructions on coughing, deep breathing, and incentive spirometry.; Future  -     CBC and Differential; Future  -     Basic metabolic panel; Future  -     Protime-INR; Future  -     APTT; Future  -     Hemoglobin A1c; Future  -     Sedimentation rate; Future  -     C-reactive protein; Future  -     Tranexamic Acid 1,000 mg in sodium chloride 0.9 % 100 mL  -     Tranexamic Acid 1,000 mg in sodium chloride 0.9 % 100 mL  -     acetaminophen (TYLENOL) tablet 975 mg  -     meloxicam (MOBIC) tablet 15 mg  -     pregabalin (LYRICA) capsule 150 mg  -     mupirocin (BACTROBAN) 2 % nasal ointment 1 application  -     clindamycin (CLEOCIN) 900 mg in dextrose (D5W) 5 % 100 mL IVPB  -     Case Request    Other orders  -     Inpatient Admission; Standing  -     Follow Anesthesia Guidelines / Standing Orders; Future  -     Obtain informed consent  -     Provide instructions to patient regarding NPO status  -     Chlorhexidine Skin Prep - Educate and Review With Patient; Future  -     Provide Patient With ERAS Hydration Instructions  -     Provide Patient With  Enhanced Recovery Booklet(s) or Handout  -     Provide Instructions/Handout For Benzoyl Peroxide 5% Wash If Having Shoulder/Arm Surgery (If Prescribed)  -     Provide Instructions/Handout For Bactroban And Chlorhexidine Shower (If Prescribed)  -     Perform A Memory Screening On All Hip/Knee Replacement Patients >Or Equal To 65 Years Or Older  -     Complete A PROMIS And HOOS Or KOOS Survey If Having Hip Or Knee Replacement  -     Follow Anesthesia Guidelines / Standing Orders; Standing  -     Verify NPO Status; Standing  -     Verify The Time Patient Completed ERAS Hydration Drink; Standing  -     SCD (sequential compression device)- to be placed on patient in Pre-op; Standing  -     Clip operative site; Standing  -     Obtain informed consent (if not collected inpatient or PAT); Standing  -     Notify Physician - Standard; Standing  -     Provide Patient With Carbo Loading Instructions  -     Provide Patient With ERAS Booklet(s)/Handout  -     mupirocin (BACTROBAN) 2 % ointment; Apply into the nostril(s) as directed by provider 2 (Two) Times a Day.  Dispense: 22 g; Refill: 0  -     Chlorhexidine Gluconate 4 % solution; Shower with hibiclens solution as directed for 5 days prior to surgery  Dispense: 237 mL; Refill: 0        1. Primary osteoarthritis of right hip      Plan  The patient is interested in learning more about a hip replacement and we discussed risks and benefits. She is interested in scheduling in 06/2021. She has had very limited relief with previous therapies including anti-inflammatories and therapeutic exercises.     Surgical Counseling   I have informed the patient of the diagnosis and the prognosis. Exhaustive conservative treatment modalities have not resulted in long term pain relief.  The symptoms have progressed to the point of daily pain and inability to perform activities of daily living without significant pain. The patient has reached the point of desiring to proceed with total hip  arthroplasty after discussing the risks, benefits and alternatives to the procedure. The surgical procedure itself was discussed in detail.  Risks of the procedure were discussed, which included but are not limited to, bleeding, infection, damage to blood vessels and nerves, incomplete pain relief, loosening of the prosthesis (early or late), deep infection (early or late), need for further surgery, leg length discrepancy, hip dislocation, loss of limb, deep venous thrombosis, pulmonary embolus, death, heart attack, stroke, kidney failure, liver failure, and anesthetic complications. In addition, the potential for deep infection developing in the future was discussed, which could require further surgery. The hip would have to be re-opened, debrided, and potentially remove the prosthesis, which may or may not be replaced in the future. Also, the possibility for loosening of the prosthesis has been mentioned. If the prosthesis loosened, a revision arthroplasty could be performed, with results that are not as predictable compared to the original procedure.  The typical rehabilitative course has also been discussed, and full recovery may take up to a year to see the maximum benefit. The importance of patient cooperation in the rehabilitative efforts has also been discussed. No guarantees were given. The patient understands the potential risks versus the benefits and desires to proceed with total hip arthroplasty at a mutually convenient time.    Return for surgery.      Scribed for Silas Ravi MD by SARA WINKLER.  04/05/21   17:15 EDT    I have personally performed the services described in this document as scribed by the above individual, and it is both accurate and complete.  Silas Ravi MD  4/6/2021  05:59 EDT

## 2021-05-17 ENCOUNTER — HOSPITAL ENCOUNTER (OUTPATIENT)
Age: 71
End: 2021-05-17
Payer: MEDICARE

## 2021-05-17 DIAGNOSIS — E55.9: Primary | ICD-10-CM

## 2021-05-17 LAB
25-OH VITAMIN D, TOTAL: 55.2 NG/ML (ref 30–100)
ALBUMIN LEVEL: 4.7 G/DL (ref 3.5–5)
ALBUMIN/GLOB SERPL: 1.8 {RATIO} (ref 1.1–1.8)
ALP ISO SERPL-ACNC: 121 U/L (ref 38–126)
ALT SERPLBLD-CCNC: 14 U/L (ref 12–78)
ANION GAP SERPL CALC-SCNC: 10.5 MEQ/L (ref 5–15)
AST SERPL QL: 26 U/L (ref 14–36)
BILIRUBIN,TOTAL: 0.8 MG/DL (ref 0.2–1.3)
BUN SERPL-MCNC: 17 MG/DL (ref 7–17)
CALCIUM SPEC-MCNC: 9.6 MG/DL (ref 8.4–10.2)
CHLORIDE SPEC-SCNC: 104 MMOL/L (ref 98–107)
CO2 SERPL-SCNC: 27 MMOL/L (ref 22–30)
CREAT BLD-SCNC: 0.8 MG/DL (ref 0.52–1.04)
ESTIMATED GLOMERULAR FILT RATE: 71 ML/MIN (ref 60–?)
GFR (AFRICAN AMERICAN): 85 ML/MIN (ref 60–?)
GLOBULIN SER CALC-MCNC: 2.6 G/DL (ref 1.3–3.2)
GLUCOSE: 115 MG/DL (ref 74–100)
HCT VFR BLD CALC: 35.2 % (ref 37–47)
HGB BLD-MCNC: 10.6 G/DL (ref 12.2–16.2)
MCHC RBC-ENTMCNC: 30.1 G/DL (ref 31.8–35.4)
MCV RBC: 83.4 FL (ref 81–99)
MEAN CORPUSCULAR HEMOGLOBIN: 25.1 PG (ref 27–31.2)
PLATELET # BLD: 325 K/MM3 (ref 142–424)
POTASSIUM: 4.5 MMOL/L (ref 3.5–5.1)
PROT SERPL-MCNC: 7.3 G/DL (ref 6.3–8.2)
RBC # BLD AUTO: 4.22 M/MM3 (ref 4.2–5.4)
SODIUM SPEC-SCNC: 137 MMOL/L (ref 136–145)
WBC # BLD AUTO: 7.6 K/MM3 (ref 4.8–10.8)

## 2021-05-17 PROCEDURE — 85025 COMPLETE CBC W/AUTO DIFF WBC: CPT

## 2021-05-17 PROCEDURE — 82306 VITAMIN D 25 HYDROXY: CPT

## 2021-05-17 PROCEDURE — 36415 COLL VENOUS BLD VENIPUNCTURE: CPT

## 2021-05-17 PROCEDURE — 80053 COMPREHEN METABOLIC PANEL: CPT

## 2021-05-27 ENCOUNTER — PRE-ADMISSION TESTING (OUTPATIENT)
Dept: PREADMISSION TESTING | Facility: HOSPITAL | Age: 71
End: 2021-05-27

## 2021-05-27 VITALS — HEIGHT: 64 IN | WEIGHT: 217.15 LBS | BODY MASS INDEX: 37.07 KG/M2

## 2021-05-27 DIAGNOSIS — M16.11 PRIMARY OSTEOARTHRITIS OF RIGHT HIP: ICD-10-CM

## 2021-05-27 LAB
ANION GAP SERPL CALCULATED.3IONS-SCNC: 12 MMOL/L (ref 5–15)
APTT PPP: 32.7 SECONDS (ref 22–39)
BASOPHILS # BLD AUTO: 0.08 10*3/MM3 (ref 0–0.2)
BASOPHILS NFR BLD AUTO: 0.9 % (ref 0–1.5)
BUN SERPL-MCNC: 11 MG/DL (ref 8–23)
BUN/CREAT SERPL: 15.3 (ref 7–25)
CALCIUM SPEC-SCNC: 9.4 MG/DL (ref 8.6–10.5)
CHLORIDE SERPL-SCNC: 100 MMOL/L (ref 98–107)
CO2 SERPL-SCNC: 25 MMOL/L (ref 22–29)
CREAT SERPL-MCNC: 0.72 MG/DL (ref 0.57–1)
CRP SERPL-MCNC: 0.89 MG/DL (ref 0–0.5)
DEPRECATED RDW RBC AUTO: 47.2 FL (ref 37–54)
EOSINOPHIL # BLD AUTO: 0.26 10*3/MM3 (ref 0–0.4)
EOSINOPHIL NFR BLD AUTO: 2.8 % (ref 0.3–6.2)
ERYTHROCYTE [DISTWIDTH] IN BLOOD BY AUTOMATED COUNT: 15.5 % (ref 12.3–15.4)
ERYTHROCYTE [SEDIMENTATION RATE] IN BLOOD: 32 MM/HR (ref 0–30)
GFR SERPL CREATININE-BSD FRML MDRD: 80 ML/MIN/1.73
GLUCOSE SERPL-MCNC: 107 MG/DL (ref 65–99)
HBA1C MFR BLD: 6.4 % (ref 4.8–5.6)
HCT VFR BLD AUTO: 33.8 % (ref 34–46.6)
HGB BLD-MCNC: 10.4 G/DL (ref 12–15.9)
IMM GRANULOCYTES # BLD AUTO: 0.02 10*3/MM3 (ref 0–0.05)
IMM GRANULOCYTES NFR BLD AUTO: 0.2 % (ref 0–0.5)
INR PPP: 1.23 (ref 0.85–1.16)
LYMPHOCYTES # BLD AUTO: 1.66 10*3/MM3 (ref 0.7–3.1)
LYMPHOCYTES NFR BLD AUTO: 17.9 % (ref 19.6–45.3)
MCH RBC QN AUTO: 25.7 PG (ref 26.6–33)
MCHC RBC AUTO-ENTMCNC: 30.8 G/DL (ref 31.5–35.7)
MCV RBC AUTO: 83.5 FL (ref 79–97)
MONOCYTES # BLD AUTO: 1.02 10*3/MM3 (ref 0.1–0.9)
MONOCYTES NFR BLD AUTO: 11 % (ref 5–12)
NEUTROPHILS NFR BLD AUTO: 6.21 10*3/MM3 (ref 1.7–7)
NEUTROPHILS NFR BLD AUTO: 67.2 % (ref 42.7–76)
NRBC BLD AUTO-RTO: 0 /100 WBC (ref 0–0.2)
PLATELET # BLD AUTO: 271 10*3/MM3 (ref 140–450)
PMV BLD AUTO: 10.3 FL (ref 6–12)
POTASSIUM SERPL-SCNC: 4.2 MMOL/L (ref 3.5–5.2)
PROTHROMBIN TIME: 15.1 SECONDS (ref 11.4–14.4)
QT INTERVAL: 440 MS
QTC INTERVAL: 440 MS
RBC # BLD AUTO: 4.05 10*6/MM3 (ref 3.77–5.28)
SODIUM SERPL-SCNC: 137 MMOL/L (ref 136–145)
WBC # BLD AUTO: 9.25 10*3/MM3 (ref 3.4–10.8)

## 2021-05-27 PROCEDURE — 93005 ELECTROCARDIOGRAM TRACING: CPT

## 2021-05-27 PROCEDURE — 85025 COMPLETE CBC W/AUTO DIFF WBC: CPT

## 2021-05-27 PROCEDURE — 80048 BASIC METABOLIC PNL TOTAL CA: CPT

## 2021-05-27 PROCEDURE — 85730 THROMBOPLASTIN TIME PARTIAL: CPT

## 2021-05-27 PROCEDURE — 93010 ELECTROCARDIOGRAM REPORT: CPT | Performed by: INTERNAL MEDICINE

## 2021-05-27 PROCEDURE — 85652 RBC SED RATE AUTOMATED: CPT

## 2021-05-27 PROCEDURE — 85610 PROTHROMBIN TIME: CPT

## 2021-05-27 PROCEDURE — 86140 C-REACTIVE PROTEIN: CPT

## 2021-05-27 PROCEDURE — 83036 HEMOGLOBIN GLYCOSYLATED A1C: CPT

## 2021-05-27 PROCEDURE — 36415 COLL VENOUS BLD VENIPUNCTURE: CPT

## 2021-05-27 ASSESSMENT — HOOS JR
HOOS JR SCORE: 43.335
HOOS JR SCORE: 15

## 2021-05-27 NOTE — PAT
An arrival time for procedure was not given during PAT visit. If patient had any questions or concerns about their arrival time, they were instructed to contact their surgeon/physician.  Additionally, if the patient referred to an arrival time that was acquired from their my chart account, patient was encouraged to verify that time with their surgeon/physician.  NO arrival times given in Pre Admission Testing Department.    Patient to apply Chlorhexadine wipes  to surgical area (as instructed) the night before procedure and the AM of procedure. Wipes provided.    Verified with patient that they received a prescription for Bactroban and Chlorhexidine shower from the office.  Reinforced with them to fill the prescription if they haven't already.  Verbal and written instructions given regarding proper use of the Bactroban and Chlorhexidine to patient and/or famlily during PAT visit. Patient/family also instructed to complete checklist and return it to Pre-op on the day of surgery.  Patient and/or family verbalized understanding.    Discussed with patient options for receiving total joint replacement education and assessed patient's ability and preference. Joint Replacement Guide given to patient during PAT visit since not received a copy within the last year. Encouraged patient/family to read guide thoroughly and notify PAT staff with any questions or concerns. Handout provided directing patient to links to watch online videos related to joint replacement surgery on the [facility] website. The handout gives detailed instructions for joining an online joint replacement class through Zoom or phone conference offered on Thursdays. Patient agreed to participate by watching videos online. Patient verbalized understanding of instructions and to complete the online learning tool survey. Encouraged to share information with family and/or . An overview of the joint replacement education was provided during the visit  including general perioperative instructions that are routine for all surgical patients (PAT PASS, wipes, directions to pre-op, etc.).  PT STATED THAT SHE AND HER  WATCHED VIDEOS ON 5/25/21, BUT NEEDS TO TAKE THE SURVEY. PT INSTRUCTED TO COMPLETE. PT VERBALIZED UNDERSTANDING.    PT HAS AN APPOINTMENT WITH DR. WOLFE ON 6/3/2021 AT 1100 FOR CARDIAC CLEARANCE AND ELIQUIS INSTRUCTIONS. EKG ON CHART FROM PAT VISIT AND 6/3/2020 FOR COMPARISON. PT DENIES CHEST PAIN OR DYSPNEA.

## 2021-06-03 ENCOUNTER — TELEPHONE (OUTPATIENT)
Dept: ORTHOPEDIC SURGERY | Facility: CLINIC | Age: 71
End: 2021-06-03

## 2021-06-03 NOTE — TELEPHONE ENCOUNTER
PATIENT WALKED INTO CLINIC STATING THAT SHE STARTED TAKING ANTIBIOTICS FOR A SINUS INFECTION ON 6/1/21. PATIENT WOULD LIKE A CALL TO SEE IF IT IS OKAY TO CONTINUE TAKING. PATIENT CAN BE REACHED [phone]

## 2021-06-04 NOTE — TELEPHONE ENCOUNTER
I called patient and told her what Dr. Ravi said. She also spoke with Tracy who faxed her labs to Dr. Mccormack.  Alba

## 2021-06-04 NOTE — TELEPHONE ENCOUNTER
Yes, she should continue to take the antibiotics for her sinus infection.  However, she does have anemia that I recommended she see her primary care physician about.  Did she see her primary care physician for an evaluation?

## 2021-06-08 ENCOUNTER — APPOINTMENT (OUTPATIENT)
Dept: PREADMISSION TESTING | Facility: HOSPITAL | Age: 71
End: 2021-06-08

## 2021-06-08 LAB — SARS-COV-2 RNA PNL SPEC NAA+PROBE: NOT DETECTED

## 2021-06-08 PROCEDURE — C9803 HOPD COVID-19 SPEC COLLECT: HCPCS

## 2021-06-08 PROCEDURE — U0004 COV-19 TEST NON-CDC HGH THRU: HCPCS

## 2021-06-08 PROCEDURE — U0005 INFEC AGEN DETEC AMPLI PROBE: HCPCS

## 2021-06-09 ENCOUNTER — ANESTHESIA EVENT (OUTPATIENT)
Dept: PERIOP | Facility: HOSPITAL | Age: 71
End: 2021-06-09

## 2021-06-09 RX ORDER — FAMOTIDINE 10 MG/ML
20 INJECTION, SOLUTION INTRAVENOUS ONCE
Status: CANCELLED | OUTPATIENT
Start: 2021-06-09 | End: 2021-06-09

## 2021-06-10 ENCOUNTER — APPOINTMENT (OUTPATIENT)
Dept: GENERAL RADIOLOGY | Facility: HOSPITAL | Age: 71
End: 2021-06-10

## 2021-06-10 ENCOUNTER — ANESTHESIA (OUTPATIENT)
Dept: PERIOP | Facility: HOSPITAL | Age: 71
End: 2021-06-10

## 2021-06-10 ENCOUNTER — HOSPITAL ENCOUNTER (INPATIENT)
Facility: HOSPITAL | Age: 71
LOS: 1 days | Discharge: HOME | End: 2021-06-11
Attending: ORTHOPAEDIC SURGERY | Admitting: ORTHOPAEDIC SURGERY

## 2021-06-10 DIAGNOSIS — Z96.641 STATUS POST TOTAL REPLACEMENT OF RIGHT HIP: Primary | ICD-10-CM

## 2021-06-10 DIAGNOSIS — M16.11 PRIMARY OSTEOARTHRITIS OF RIGHT HIP: ICD-10-CM

## 2021-06-10 PROBLEM — R73.09 ELEVATED HEMOGLOBIN A1C: Status: ACTIVE | Noted: 2021-06-10

## 2021-06-10 PROBLEM — Z79.01 CHRONIC ANTICOAGULATION: Status: ACTIVE | Noted: 2021-06-10

## 2021-06-10 LAB — POTASSIUM SERPL-SCNC: 3.7 MMOL/L (ref 3.5–5.2)

## 2021-06-10 PROCEDURE — C1889 IMPLANT/INSERT DEVICE, NOC: HCPCS | Performed by: ORTHOPAEDIC SURGERY

## 2021-06-10 PROCEDURE — 97116 GAIT TRAINING THERAPY: CPT

## 2021-06-10 PROCEDURE — 0SR904A REPLACEMENT OF RIGHT HIP JOINT WITH CERAMIC ON POLYETHYLENE SYNTHETIC SUBSTITUTE, UNCEMENTED, OPEN APPROACH: ICD-10-PCS | Performed by: ORTHOPAEDIC SURGERY

## 2021-06-10 PROCEDURE — 25010000002 FENTANYL CITRATE (PF) 50 MCG/ML SOLUTION: Performed by: NURSE ANESTHETIST, CERTIFIED REGISTERED

## 2021-06-10 PROCEDURE — 76000 FLUOROSCOPY <1 HR PHYS/QHP: CPT

## 2021-06-10 PROCEDURE — C1755 CATHETER, INTRASPINAL: HCPCS | Performed by: ORTHOPAEDIC SURGERY

## 2021-06-10 PROCEDURE — 73502 X-RAY EXAM HIP UNI 2-3 VIEWS: CPT

## 2021-06-10 PROCEDURE — 84132 ASSAY OF SERUM POTASSIUM: CPT | Performed by: ANESTHESIOLOGY

## 2021-06-10 PROCEDURE — 25010000002 PHENYLEPHRINE 10 MG/ML SOLUTION 1 ML VIAL: Performed by: NURSE ANESTHETIST, CERTIFIED REGISTERED

## 2021-06-10 PROCEDURE — 27130 TOTAL HIP ARTHROPLASTY: CPT | Performed by: ORTHOPAEDIC SURGERY

## 2021-06-10 PROCEDURE — 94799 UNLISTED PULMONARY SVC/PX: CPT

## 2021-06-10 PROCEDURE — 27130 TOTAL HIP ARTHROPLASTY: CPT | Performed by: PHYSICIAN ASSISTANT

## 2021-06-10 PROCEDURE — C1776 JOINT DEVICE (IMPLANTABLE): HCPCS | Performed by: ORTHOPAEDIC SURGERY

## 2021-06-10 PROCEDURE — 97161 PT EVAL LOW COMPLEX 20 MIN: CPT

## 2021-06-10 PROCEDURE — 25010000002 DEXAMETHASONE PER 1 MG: Performed by: NURSE ANESTHETIST, CERTIFIED REGISTERED

## 2021-06-10 PROCEDURE — S0260 H&P FOR SURGERY: HCPCS | Performed by: ORTHOPAEDIC SURGERY

## 2021-06-10 PROCEDURE — 25010000002 ROPIVACAINE PER 1 MG: Performed by: ORTHOPAEDIC SURGERY

## 2021-06-10 PROCEDURE — 25010000002 PROPOFOL 10 MG/ML EMULSION: Performed by: NURSE ANESTHETIST, CERTIFIED REGISTERED

## 2021-06-10 PROCEDURE — 25010000002 HYDROMORPHONE PER 4 MG: Performed by: NURSE ANESTHETIST, CERTIFIED REGISTERED

## 2021-06-10 PROCEDURE — 25010000002 ONDANSETRON PER 1 MG: Performed by: NURSE ANESTHETIST, CERTIFIED REGISTERED

## 2021-06-10 DEVICE — IMPLANTABLE DEVICE: Type: IMPLANTABLE DEVICE | Site: HIP | Status: FUNCTIONAL

## 2021-06-10 DEVICE — R3 US DELTA HEAD 32 +0
Type: IMPLANTABLE DEVICE | Site: HIP | Status: FUNCTIONAL
Brand: BIOLOX DELTA

## 2021-06-10 DEVICE — DEV CONTRL TISS STRATAFIX SYMM PDS PLUS VIL CT-1 45CM: Type: IMPLANTABLE DEVICE | Site: HIP | Status: FUNCTIONAL

## 2021-06-10 DEVICE — DEV CONTRL TISS STRATAFIX SPIRAL MNCRYL UD 3/0 PLS 60CM: Type: IMPLANTABLE DEVICE | Site: HIP | Status: FUNCTIONAL

## 2021-06-10 DEVICE — R3 3 HOLE ACETABULAR SHELL 50MM
Type: IMPLANTABLE DEVICE | Site: HIP | Status: FUNCTIONAL
Brand: R3 ACETABULAR

## 2021-06-10 DEVICE — POLARSTEM COLLAR STANDARD                                    NON-CEMENTED WITH TI/HA 2
Type: IMPLANTABLE DEVICE | Site: HIP | Status: FUNCTIONAL
Brand: POLARSTEM

## 2021-06-10 DEVICE — REFLECTION SPHERICAL HEAD SCREW 25MM
Type: IMPLANTABLE DEVICE | Site: HIP | Status: FUNCTIONAL
Brand: REFLECTION

## 2021-06-10 DEVICE — R3 0 DEGREE XLPE ACETABULAR LINER                                    32MM ID X OD 50MM
Type: IMPLANTABLE DEVICE | Site: HIP | Status: FUNCTIONAL
Brand: R3

## 2021-06-10 RX ORDER — LEVOTHYROXINE SODIUM 0.05 MG/1
50 TABLET ORAL
Status: DISCONTINUED | OUTPATIENT
Start: 2021-06-11 | End: 2021-06-11 | Stop reason: HOSPADM

## 2021-06-10 RX ORDER — SODIUM CHLORIDE 0.9 % (FLUSH) 0.9 %
3 SYRINGE (ML) INJECTION EVERY 12 HOURS SCHEDULED
Status: DISCONTINUED | OUTPATIENT
Start: 2021-06-10 | End: 2021-06-11 | Stop reason: HOSPADM

## 2021-06-10 RX ORDER — ACETAMINOPHEN 500 MG
1000 TABLET ORAL ONCE
Status: COMPLETED | OUTPATIENT
Start: 2021-06-10 | End: 2021-06-10

## 2021-06-10 RX ORDER — LABETALOL HYDROCHLORIDE 5 MG/ML
10 INJECTION, SOLUTION INTRAVENOUS EVERY 4 HOURS PRN
Status: DISCONTINUED | OUTPATIENT
Start: 2021-06-10 | End: 2021-06-11 | Stop reason: HOSPADM

## 2021-06-10 RX ORDER — MEPERIDINE HYDROCHLORIDE 25 MG/ML
12.5 INJECTION INTRAMUSCULAR; INTRAVENOUS; SUBCUTANEOUS
Status: DISCONTINUED | OUTPATIENT
Start: 2021-06-10 | End: 2021-06-10 | Stop reason: HOSPADM

## 2021-06-10 RX ORDER — ONDANSETRON 2 MG/ML
4 INJECTION INTRAMUSCULAR; INTRAVENOUS ONCE AS NEEDED
Status: DISCONTINUED | OUTPATIENT
Start: 2021-06-10 | End: 2021-06-10 | Stop reason: HOSPADM

## 2021-06-10 RX ORDER — LEVOTHYROXINE SODIUM 0.05 MG/1
50 TABLET ORAL ONCE
Status: COMPLETED | OUTPATIENT
Start: 2021-06-10 | End: 2021-06-10

## 2021-06-10 RX ORDER — MIDAZOLAM HYDROCHLORIDE 1 MG/ML
0.5 INJECTION INTRAMUSCULAR; INTRAVENOUS
Status: DISCONTINUED | OUTPATIENT
Start: 2021-06-10 | End: 2021-06-10 | Stop reason: HOSPADM

## 2021-06-10 RX ORDER — HYDROMORPHONE HYDROCHLORIDE 1 MG/ML
0.5 INJECTION, SOLUTION INTRAMUSCULAR; INTRAVENOUS; SUBCUTANEOUS
Status: DISCONTINUED | OUTPATIENT
Start: 2021-06-10 | End: 2021-06-10 | Stop reason: HOSPADM

## 2021-06-10 RX ORDER — TRANEXAMIC ACID 10 MG/ML
1000 INJECTION, SOLUTION INTRAVENOUS ONCE
Status: COMPLETED | OUTPATIENT
Start: 2021-06-10 | End: 2021-06-10

## 2021-06-10 RX ORDER — PREGABALIN 150 MG/1
150 CAPSULE ORAL ONCE
Status: COMPLETED | OUTPATIENT
Start: 2021-06-10 | End: 2021-06-10

## 2021-06-10 RX ORDER — ROPIVACAINE HYDROCHLORIDE 5 MG/ML
INJECTION, SOLUTION EPIDURAL; INFILTRATION; PERINEURAL AS NEEDED
Status: DISCONTINUED | OUTPATIENT
Start: 2021-06-10 | End: 2021-06-10 | Stop reason: HOSPADM

## 2021-06-10 RX ORDER — BUPIVACAINE HYDROCHLORIDE 5 MG/ML
INJECTION, SOLUTION PERINEURAL
Status: COMPLETED | OUTPATIENT
Start: 2021-06-10 | End: 2021-06-10

## 2021-06-10 RX ORDER — LIDOCAINE HYDROCHLORIDE 10 MG/ML
INJECTION, SOLUTION EPIDURAL; INFILTRATION; INTRACAUDAL; PERINEURAL AS NEEDED
Status: DISCONTINUED | OUTPATIENT
Start: 2021-06-10 | End: 2021-06-10 | Stop reason: SURG

## 2021-06-10 RX ORDER — MELOXICAM 15 MG/1
15 TABLET ORAL ONCE
Status: COMPLETED | OUTPATIENT
Start: 2021-06-10 | End: 2021-06-10

## 2021-06-10 RX ORDER — CYCLOBENZAPRINE HCL 10 MG
10 TABLET ORAL NIGHTLY PRN
Status: DISCONTINUED | OUTPATIENT
Start: 2021-06-10 | End: 2021-06-11 | Stop reason: HOSPADM

## 2021-06-10 RX ORDER — METOPROLOL TARTRATE 50 MG/1
50 TABLET, FILM COATED ORAL 2 TIMES DAILY
Status: DISCONTINUED | OUTPATIENT
Start: 2021-06-10 | End: 2021-06-11 | Stop reason: HOSPADM

## 2021-06-10 RX ORDER — CLINDAMYCIN PHOSPHATE 900 MG/50ML
900 INJECTION INTRAVENOUS EVERY 8 HOURS
Status: COMPLETED | OUTPATIENT
Start: 2021-06-10 | End: 2021-06-11

## 2021-06-10 RX ORDER — NALOXONE HCL 0.4 MG/ML
0.1 VIAL (ML) INJECTION
Status: DISCONTINUED | OUTPATIENT
Start: 2021-06-10 | End: 2021-06-11 | Stop reason: HOSPADM

## 2021-06-10 RX ORDER — ASPIRIN 325 MG
325 TABLET, DELAYED RELEASE (ENTERIC COATED) ORAL DAILY
Status: DISCONTINUED | OUTPATIENT
Start: 2021-06-11 | End: 2021-06-10

## 2021-06-10 RX ORDER — DEXAMETHASONE SODIUM PHOSPHATE 4 MG/ML
INJECTION, SOLUTION INTRA-ARTICULAR; INTRALESIONAL; INTRAMUSCULAR; INTRAVENOUS; SOFT TISSUE AS NEEDED
Status: DISCONTINUED | OUTPATIENT
Start: 2021-06-10 | End: 2021-06-10 | Stop reason: SURG

## 2021-06-10 RX ORDER — MAGNESIUM HYDROXIDE 1200 MG/15ML
LIQUID ORAL AS NEEDED
Status: DISCONTINUED | OUTPATIENT
Start: 2021-06-10 | End: 2021-06-10 | Stop reason: HOSPADM

## 2021-06-10 RX ORDER — NALOXONE HCL 0.4 MG/ML
0.4 VIAL (ML) INJECTION
Status: DISCONTINUED | OUTPATIENT
Start: 2021-06-10 | End: 2021-06-11 | Stop reason: HOSPADM

## 2021-06-10 RX ORDER — ONDANSETRON 2 MG/ML
4 INJECTION INTRAMUSCULAR; INTRAVENOUS EVERY 6 HOURS PRN
Status: DISCONTINUED | OUTPATIENT
Start: 2021-06-10 | End: 2021-06-11 | Stop reason: HOSPADM

## 2021-06-10 RX ORDER — FENTANYL CITRATE 50 UG/ML
50 INJECTION, SOLUTION INTRAMUSCULAR; INTRAVENOUS
Status: DISCONTINUED | OUTPATIENT
Start: 2021-06-10 | End: 2021-06-10 | Stop reason: HOSPADM

## 2021-06-10 RX ORDER — FLECAINIDE ACETATE 50 MG/1
50 TABLET ORAL EVERY 12 HOURS SCHEDULED
Status: DISCONTINUED | OUTPATIENT
Start: 2021-06-10 | End: 2021-06-11 | Stop reason: HOSPADM

## 2021-06-10 RX ORDER — BUPIVACAINE HCL/0.9 % NACL/PF 0.125 %
PLASTIC BAG, INJECTION (ML) EPIDURAL AS NEEDED
Status: DISCONTINUED | OUTPATIENT
Start: 2021-06-10 | End: 2021-06-10 | Stop reason: SURG

## 2021-06-10 RX ORDER — CLINDAMYCIN PHOSPHATE 900 MG/50ML
900 INJECTION INTRAVENOUS ONCE
Status: COMPLETED | OUTPATIENT
Start: 2021-06-10 | End: 2021-06-10

## 2021-06-10 RX ORDER — NALOXONE HCL 0.4 MG/ML
0.4 VIAL (ML) INJECTION AS NEEDED
Status: DISCONTINUED | OUTPATIENT
Start: 2021-06-10 | End: 2021-06-10 | Stop reason: HOSPADM

## 2021-06-10 RX ORDER — SODIUM CHLORIDE, SODIUM LACTATE, POTASSIUM CHLORIDE, CALCIUM CHLORIDE 600; 310; 30; 20 MG/100ML; MG/100ML; MG/100ML; MG/100ML
9 INJECTION, SOLUTION INTRAVENOUS CONTINUOUS
Status: DISCONTINUED | OUTPATIENT
Start: 2021-06-10 | End: 2021-06-10

## 2021-06-10 RX ORDER — FAMOTIDINE 20 MG/1
20 TABLET, FILM COATED ORAL ONCE
Status: COMPLETED | OUTPATIENT
Start: 2021-06-10 | End: 2021-06-10

## 2021-06-10 RX ORDER — ONDANSETRON 2 MG/ML
INJECTION INTRAMUSCULAR; INTRAVENOUS AS NEEDED
Status: DISCONTINUED | OUTPATIENT
Start: 2021-06-10 | End: 2021-06-10 | Stop reason: SURG

## 2021-06-10 RX ORDER — HYDROMORPHONE HYDROCHLORIDE 1 MG/ML
0.5 INJECTION, SOLUTION INTRAMUSCULAR; INTRAVENOUS; SUBCUTANEOUS
Status: DISCONTINUED | OUTPATIENT
Start: 2021-06-10 | End: 2021-06-11 | Stop reason: HOSPADM

## 2021-06-10 RX ORDER — MELOXICAM 7.5 MG/1
15 TABLET ORAL DAILY
Status: DISCONTINUED | OUTPATIENT
Start: 2021-06-11 | End: 2021-06-11 | Stop reason: HOSPADM

## 2021-06-10 RX ORDER — EPHEDRINE SULFATE 50 MG/ML
INJECTION, SOLUTION INTRAVENOUS AS NEEDED
Status: DISCONTINUED | OUTPATIENT
Start: 2021-06-10 | End: 2021-06-10 | Stop reason: SURG

## 2021-06-10 RX ORDER — PROPOFOL 10 MG/ML
VIAL (ML) INTRAVENOUS AS NEEDED
Status: DISCONTINUED | OUTPATIENT
Start: 2021-06-10 | End: 2021-06-10 | Stop reason: SURG

## 2021-06-10 RX ORDER — MORPHINE SULFATE 4 MG/ML
4 INJECTION, SOLUTION INTRAMUSCULAR; INTRAVENOUS
Status: DISCONTINUED | OUTPATIENT
Start: 2021-06-10 | End: 2021-06-11 | Stop reason: HOSPADM

## 2021-06-10 RX ORDER — SODIUM CHLORIDE 0.9 % (FLUSH) 0.9 %
10 SYRINGE (ML) INJECTION AS NEEDED
Status: DISCONTINUED | OUTPATIENT
Start: 2021-06-10 | End: 2021-06-10 | Stop reason: HOSPADM

## 2021-06-10 RX ORDER — GLYCOPYRROLATE 0.2 MG/ML
INJECTION INTRAMUSCULAR; INTRAVENOUS AS NEEDED
Status: DISCONTINUED | OUTPATIENT
Start: 2021-06-10 | End: 2021-06-10 | Stop reason: SURG

## 2021-06-10 RX ORDER — ONDANSETRON 4 MG/1
4 TABLET, FILM COATED ORAL EVERY 6 HOURS PRN
Status: DISCONTINUED | OUTPATIENT
Start: 2021-06-10 | End: 2021-06-11 | Stop reason: HOSPADM

## 2021-06-10 RX ORDER — SODIUM CHLORIDE 0.9 % (FLUSH) 0.9 %
10 SYRINGE (ML) INJECTION EVERY 12 HOURS SCHEDULED
Status: DISCONTINUED | OUTPATIENT
Start: 2021-06-10 | End: 2021-06-10 | Stop reason: HOSPADM

## 2021-06-10 RX ORDER — OXYCODONE HYDROCHLORIDE 5 MG/1
5 TABLET ORAL EVERY 4 HOURS PRN
Status: DISCONTINUED | OUTPATIENT
Start: 2021-06-10 | End: 2021-06-11 | Stop reason: HOSPADM

## 2021-06-10 RX ORDER — SODIUM CHLORIDE, SODIUM LACTATE, POTASSIUM CHLORIDE, CALCIUM CHLORIDE 600; 310; 30; 20 MG/100ML; MG/100ML; MG/100ML; MG/100ML
100 INJECTION, SOLUTION INTRAVENOUS CONTINUOUS
Status: DISCONTINUED | OUTPATIENT
Start: 2021-06-10 | End: 2021-06-11 | Stop reason: HOSPADM

## 2021-06-10 RX ORDER — EPHEDRINE SULFATE 50 MG/ML
5 INJECTION, SOLUTION INTRAVENOUS ONCE AS NEEDED
Status: DISCONTINUED | OUTPATIENT
Start: 2021-06-10 | End: 2021-06-10 | Stop reason: HOSPADM

## 2021-06-10 RX ORDER — LIDOCAINE HYDROCHLORIDE 10 MG/ML
0.5 INJECTION, SOLUTION EPIDURAL; INFILTRATION; INTRACAUDAL; PERINEURAL ONCE AS NEEDED
Status: COMPLETED | OUTPATIENT
Start: 2021-06-10 | End: 2021-06-10

## 2021-06-10 RX ORDER — SODIUM CHLORIDE 9 MG/ML
120 INJECTION, SOLUTION INTRAVENOUS CONTINUOUS
Status: DISCONTINUED | OUTPATIENT
Start: 2021-06-10 | End: 2021-06-11 | Stop reason: HOSPADM

## 2021-06-10 RX ORDER — SODIUM CHLORIDE 0.9 % (FLUSH) 0.9 %
1-10 SYRINGE (ML) INJECTION AS NEEDED
Status: DISCONTINUED | OUTPATIENT
Start: 2021-06-10 | End: 2021-06-11 | Stop reason: HOSPADM

## 2021-06-10 RX ADMIN — PROPOFOL 30 MG: 10 INJECTION, EMULSION INTRAVENOUS at 10:46

## 2021-06-10 RX ADMIN — OXYCODONE 5 MG: 5 TABLET ORAL at 22:11

## 2021-06-10 RX ADMIN — SODIUM CHLORIDE, POTASSIUM CHLORIDE, SODIUM LACTATE AND CALCIUM CHLORIDE 9 ML/HR: 600; 310; 30; 20 INJECTION, SOLUTION INTRAVENOUS at 08:25

## 2021-06-10 RX ADMIN — EPHEDRINE SULFATE 10 MG: 50 INJECTION INTRAVENOUS at 11:31

## 2021-06-10 RX ADMIN — MELOXICAM 15 MG: 15 TABLET ORAL at 08:26

## 2021-06-10 RX ADMIN — CYCLOBENZAPRINE HYDROCHLORIDE 10 MG: 10 TABLET, FILM COATED ORAL at 20:27

## 2021-06-10 RX ADMIN — MUPIROCIN 1 APPLICATION: 20 OINTMENT TOPICAL at 08:26

## 2021-06-10 RX ADMIN — PROPOFOL 100 MCG/KG/MIN: 10 INJECTION, EMULSION INTRAVENOUS at 10:57

## 2021-06-10 RX ADMIN — FLECAINIDE ACETATE 50 MG: 50 TABLET ORAL at 20:07

## 2021-06-10 RX ADMIN — PROPOFOL 50 MG: 10 INJECTION, EMULSION INTRAVENOUS at 11:56

## 2021-06-10 RX ADMIN — CLINDAMYCIN PHOSPHATE 900 MG: 900 INJECTION, SOLUTION INTRAVENOUS at 10:41

## 2021-06-10 RX ADMIN — METOPROLOL TARTRATE 50 MG: 50 TABLET, FILM COATED ORAL at 20:07

## 2021-06-10 RX ADMIN — SODIUM CHLORIDE, POTASSIUM CHLORIDE, SODIUM LACTATE AND CALCIUM CHLORIDE: 600; 310; 30; 20 INJECTION, SOLUTION INTRAVENOUS at 12:15

## 2021-06-10 RX ADMIN — HYDROMORPHONE HYDROCHLORIDE 0.5 MG: 1 INJECTION, SOLUTION INTRAMUSCULAR; INTRAVENOUS; SUBCUTANEOUS at 13:17

## 2021-06-10 RX ADMIN — GLYCOPYRROLATE 0.2 MCG: 0.4 INJECTION INTRAMUSCULAR; INTRAVENOUS at 11:41

## 2021-06-10 RX ADMIN — FENTANYL CITRATE 50 MCG: 50 INJECTION INTRAMUSCULAR; INTRAVENOUS at 13:40

## 2021-06-10 RX ADMIN — EPHEDRINE SULFATE 10 MG: 50 INJECTION INTRAVENOUS at 11:17

## 2021-06-10 RX ADMIN — LEVOTHYROXINE SODIUM 50 MCG: 50 TABLET ORAL at 15:16

## 2021-06-10 RX ADMIN — LIDOCAINE HYDROCHLORIDE 0.5 ML: 10 INJECTION, SOLUTION EPIDURAL; INFILTRATION; INTRACAUDAL; PERINEURAL at 08:26

## 2021-06-10 RX ADMIN — LIDOCAINE HYDROCHLORIDE 20 MG: 10 INJECTION, SOLUTION EPIDURAL; INFILTRATION; INTRACAUDAL; PERINEURAL at 10:46

## 2021-06-10 RX ADMIN — BUPIVACAINE HYDROCHLORIDE 2 ML: 5 INJECTION, SOLUTION PERINEURAL at 10:52

## 2021-06-10 RX ADMIN — SODIUM CHLORIDE 120 ML/HR: 9 INJECTION, SOLUTION INTRAVENOUS at 14:33

## 2021-06-10 RX ADMIN — CLINDAMYCIN PHOSPHATE 900 MG: 900 INJECTION, SOLUTION INTRAVENOUS at 17:12

## 2021-06-10 RX ADMIN — ACETAMINOPHEN 1000 MG: 500 TABLET ORAL at 08:26

## 2021-06-10 RX ADMIN — PREGABALIN 150 MG: 150 CAPSULE ORAL at 08:26

## 2021-06-10 RX ADMIN — ONDANSETRON 4 MG: 2 INJECTION INTRAMUSCULAR; INTRAVENOUS at 12:26

## 2021-06-10 RX ADMIN — PHENYLEPHRINE HYDROCHLORIDE 0.25 MCG/KG/MIN: 10 INJECTION INTRAVENOUS at 12:08

## 2021-06-10 RX ADMIN — TRANEXAMIC ACID 1000 MG: 10 INJECTION, SOLUTION INTRAVENOUS at 10:55

## 2021-06-10 RX ADMIN — FENTANYL CITRATE 50 MCG: 50 INJECTION INTRAMUSCULAR; INTRAVENOUS at 13:24

## 2021-06-10 RX ADMIN — OXYCODONE 5 MG: 5 TABLET ORAL at 15:16

## 2021-06-10 RX ADMIN — Medication 160 MCG: at 11:39

## 2021-06-10 RX ADMIN — FAMOTIDINE 20 MG: 20 TABLET ORAL at 08:26

## 2021-06-10 RX ADMIN — TRANEXAMIC ACID 1000 MG: 10 INJECTION, SOLUTION INTRAVENOUS at 12:08

## 2021-06-10 RX ADMIN — EPHEDRINE SULFATE 10 MG: 50 INJECTION INTRAVENOUS at 11:40

## 2021-06-10 RX ADMIN — DEXAMETHASONE SODIUM PHOSPHATE 8 MG: 4 INJECTION, SOLUTION INTRA-ARTICULAR; INTRALESIONAL; INTRAMUSCULAR; INTRAVENOUS; SOFT TISSUE at 12:26

## 2021-06-10 NOTE — PLAN OF CARE
Problem: Adult Inpatient Plan of Care  Goal: Plan of Care Review  Flowsheets (Taken 6/10/2021 7037)  Progress: improving  Plan of Care Reviewed With: patient  Outcome Summary: PT eval complete. Pt ambulated 360 feet using RW and min Ax2 for balance initially, progressing to CGA x2 for safety. Pt experiencining slight R knee buckling initially during gait, however, none noted as gait progressed. Bed mobility performed with supervision, STS and toilet transfers with min A. Reviewed HEP and hip precautions. PADD score = 8. Recommend pt d/c home with assist and HHPT, pending improvement with mobility tasks and when medically appropriate.   Goal Outcome Evaluation:  Plan of Care Reviewed With: patient        Progress: improving  Outcome Summary: PT eval complete. Pt ambulated 360 feet using RW and min Ax2 for balance initially, progressing to CGA x2 for safety. Pt experiencining slight R knee buckling initially during gait, however, none noted as gait progressed. Bed mobility performed with supervision, STS and toilet transfers with min A. Reviewed HEP and hip precautions. PADD score = 8. Recommend pt d/c home with assist and HHPT, pending improvement with mobility tasks and when medically appropriate.

## 2021-06-10 NOTE — OP NOTE
DATE OF PROCEDURE:  06/10/21    PREOPERATIVE DIAGNOSIS: right hip arthritis    POSTOPERATIVE DIAGNOSIS: right hip arthritis    PROCEDURE PERFORMED: right total hip arthroplasty with [employer]hew components, anterior modified Santoro-Velazco approach    IMPLANTS: #50 press-fit R3 cup, 25 screw,  neutral polyethylene liner,  #2 standard offset press-fit Polarstem, 32 x +0 Bio-lox ceramic head    SURGEON: Silas Ravi MD    ASSISTANT: Cherrie White PA-C  (Cherrie White PA-C was present and necessary for positioning, draping, retraction, instrumentation and closure.)    SPECIMENS: None    IMPLANTS:   Implants     Implant    Bilateral Knee Hardware - Implanted      As of 5/27/2021     Status: Implanted                         ANESTHESIA:  Spinal    STAFF:  Circulator: Kayla Figueroa RN; Maria Victoria Juarez RN  Radiology Technologist: Dustin Mejía RT; Aly Lang  Scrub Person: Lynne Catalan; Buster Bucio  Vendor Representative: Audie Anne  Nursing Assistant: Leora Stallings  Assistant: Cherrie White PA-C    ESTIMATED BLOOD LOSS: 250 mL     COMPLICATIONS: None    PREOPERATIVE ANTIBIOTICS: Clindamycin 900 mg    INDICATIONS: The patient is a 70 y.o. female with a history of debilitating right hip pain secondary to osteoarthritis, that failed to improve in spite of conservative treatment. The patient opted for a right total hip arthroplasty at this time and consented for the procedure. Please see my office notes for details with regard to preoperative counseling and operative rationale.     DESCRIPTION OF PROCEDURE: The patient was positively identified in the preoperative holding area, brought to the operative suite, and placed in a supine position. After adequate spinal anesthetic had been achieved, the patient was placed in the supine position with a well padded folded blanket bolster under the right flank area, leaving the buttock free. After sterile prep and drape of the right hip  "and lower extremity, as well as draping the non-operative extremity to allow access for limb length assessment, a timeout procedure was performed to confirm the operative site, as well as the other parameters.     After placing a bump under the knee, a skin incision was made over the lateral border of the tensor fascia latae from the level of the anterior superior iliac spine distally on the anterior aspect of the hip for a modified Santoro-Velazco approach. Following a sharp skin incision, dissection was carried down to the level of the fascia, ensuring clear identification of the interval between the tensor and the fascia laterally.  Fascia was then incised from proximal to distal, leaving a good cuff of tissue for later repair, then working form distal to proximal perforating vessels were identified and cauterized, including the perforating vessels along the anterior edge of the gluteus medius.  With the anterior edge of the gluteus medius identified, a Cobra retractor was placed on the capsule over the superior aspect of the femoral neck.  After identifying the superior edge of the vastus lateralis distally, a second Cobra retractor was placed over the capsule overlying the inferior femoral neck.  The reflected head of the rectus femoris was identified, and the fascia released enough to allow placement of a single prong acetabular retractor. The knee bump was then removed, leg externally rotated, and anterior capsule excised first laterally then medially, and the labrum incised superiorly.  Cobra retractors were repositioned on the exposed femoral neck both superiorly and inferiorly.  Description of femoral head: Complete eburnation, with osteophytes at the head neck junction.  A neck cut was then made at the head and neck junction with the aid of an oscillating saw blade, followed by a second cut at the base of the femoral neck.  The \"napkin ring\" femoral neck fragment was removed, as well as the femoral head " after repositioning the posterior Cobra retractor just outside the hip capsule.    Acetabular exposure was then addressed by repositioning the anterior Cobra retractor between the capsule and the psoas tendon.  The labrum was then removed from the rim of the acetabulum anteriorly, superiorly and posteriorly, preserving the transverse acetabular ligament. The anterior Cobra retractor was replaced over the transverse acetabular ligament, and a Alfonso retractor placed over the posterior wall of the acetabulum.  Description of acetabulum: Complete wear, with anterior acetabular osteophytes. With the transverse acetabular ligament as a reference for cup positioning, the acetabulum was sequentially reamed up to 50 to accommodate a 50 press-fit R3 cup, which had excellent press-fit characteristics.  A single 25 mm screw was placed, which had excellent purchase, followed by a neutral polyethylene liner.    Attention was then redirected towards the femoral aspect. The non-operative limb was then placed on a padded Morales stand, to allow for appropriate positioning of the operative limb.  Using the bone hook for traction in the calcar region of the proximal femur, the posterior capsule was released adjacent to the greater trochanter to allow for delivery of the proximal femur with a double fang retractor.  With appropriate external rotation of the proximal femur and further adduction of the leg following double fang retractor placement and removal of the single-toothed fang anteriorly, a Alfonso retractor was placed in the region of the calcar and femoral preparations were made to accommodate the polar stem.  Box osteotome was used to prepare the lateral aspect, followed by the T-handle canal finder, then sequential broaching of the femur to accommodate a #2 broach, standard offset neck, with a +0 x 32 head.      Trial reduction was performed, full arc of motion noted, with appropriate limb lengths after leveling the table.   Intraoperative fluoroscopy showed appropriate implant alignment and leg lengths.  The hip was again dislocated and the final #2 standard offset press-fit Polar stem placed, followed by +0 x 32 ceramic head, with the same reduction characteristics were noted as with the trial with no dislocation throughout the full arc of motion and appropriate limb lengths.      Therefore, the hip was copiously irrigated and attention directed towards closure. Fascia latae was closed with #1 Vicryl in an interrupted figure-of-eight fashion in 3 strategic locations both proximally, distally and in the central portion, followed by oversewing this from distal to proximal with a #1 StrataFix symmetric, which nicely sealed that layer, followed by closure of the subcutaneous layer with 2-0 Vicryl and the skin with 3-0 StrataFix in a running subcuticular fashion.  Adhesive wound closure dressing was applied followed by a sterile dressing with 4 x 4s secured with micropore tape.  The patient tolerated the procedure well and was brought to the recovery room in good condition.     POSTOPERATIVE PLAN:  1. The patient will begin early range of motion and weight-bearing per the post anterior total hip arthroplasty protocol.   2. I anticipate brief hospitalization for initial rehabilitation and pain control followed by continued rehabilitation in a home health or outpatient physical therapy setting.  Patient likely ready for discharge tomorrow as long as her pain is controlled, and cleared both medically and by physical therapy.   3. Postoperative medical management with Dr. Singh.  4. Postoperative DVT prophylaxis with aspirin.  5. Postoperative IV antibiotics with clindamycin for 24 hours.      Silas Ravi MD  06/10/21  12:38 EDT

## 2021-06-10 NOTE — ANESTHESIA POSTPROCEDURE EVALUATION
Patient: Chelsey Herrera    Procedure Summary     Date: 06/10/21 Room / Location:  [facility] /  [facility]    Anesthesia Start: 1041 Anesthesia Stop:     Procedure: TOTAL HIP ARTHROPLASTY ANTERIOR MODIFIED (Right Hip) Diagnosis:       Primary osteoarthritis of right hip      (Primary osteoarthritis of right hip [M16.11])    Surgeons: Silas Ravi MD Provider: Saman Young MD    Anesthesia Type: spinal ASA Status: 3          Anesthesia Type: spinal    Vitals  No vitals data found for the desired time range.          Post Anesthesia Care and Evaluation    Patient location during evaluation: PACU  Patient participation: complete - patient participated  Level of consciousness: awake and alert  Pain score: 0  Pain management: adequate  Airway patency: patent  Anesthetic complications: No anesthetic complications  PONV Status: none  Cardiovascular status: hemodynamically stable and acceptable  Respiratory status: nonlabored ventilation, acceptable and nasal cannula  Hydration status: acceptable

## 2021-06-10 NOTE — ANESTHESIA PREPROCEDURE EVALUATION
Anesthesia Evaluation     history of anesthetic complications: PONV               Airway   Mallampati: I  TM distance: >3 FB  Neck ROM: full  No difficulty expected  Dental      Pulmonary    (+) shortness of breath,   Cardiovascular     ECG reviewed    (+) hypertension, dysrhythmias Atrial Fib,       Neuro/Psych  (+) numbness,     GI/Hepatic/Renal/Endo    (+)   renal disease,     Musculoskeletal     (+) back pain,   Abdominal    Substance History      OB/GYN          Other   arthritis,                      Anesthesia Plan    ASA 3     spinal     intravenous induction     Anesthetic plan, all risks, benefits, and alternatives have been provided, discussed and informed consent has been obtained with: patient.    Plan discussed with CRNA.

## 2021-06-10 NOTE — ANESTHESIA PROCEDURE NOTES
Spinal Block      Patient reassessed immediately prior to procedure    Patient location during procedure: OR  Indication:at surgeon's request  Performed By  CRNA: Dorian Taylor CRNA  Preanesthetic Checklist  Completed: patient identified, IV checked, site marked, risks and benefits discussed, surgical consent, monitors and equipment checked, pre-op evaluation and timeout performed  Spinal Block Prep:  Patient Position:sitting  Sterile Tech:cap, gloves, sterile barriers and mask  Prep:Chloraprep  Patient Monitoring:blood pressure monitoring, continuous pulse oximetry and EKG  Spinal Block Procedure  Approach:midline  Guidance:landmark technique and palpation technique  Location:L3-L4  Needle Type:Sprotte  Needle Gauge:22 G  Placement of Spinal needle event:cerebrospinal fluid aspirated  Paresthesia: no  Fluid Appearance:clear  Medications: bupivacaine (MARCAINE) 0.5 % injection, 2 mL  Med Administered at 6/10/2021 10:52 AM   Post Assessment  Patient Tolerance:patient tolerated the procedure well with no apparent complications  Complications no  Additional Notes  Procedure:  Pt assisted to sitting position, with legs in position of comfort over side of bed.  Pt. instructed in optimal spine presentation, the spine was prepped/ Draped and the skin at insertion site was anesthetized with 1% Lidocaine 2 ml.  The spinal needle was then advanced until CSF flow was obtained and LA was injected:

## 2021-06-10 NOTE — PLAN OF CARE
Pt from PACU, alert and oriented, right hip dressing is CDI, on 2L oxygen per nasal cannula. Pt has been up and ambulated, up in chair, voiding spontaneously, prn pain meds, BP has been elevated, continuing to monitor, otherwise VSS.

## 2021-06-10 NOTE — H&P
Pre-Op H&P  Chelsey Herrera  [id number]  1950      Chief complaint: Right hip pain      Subjective:  Patient is a 70 y.o.female presents for scheduled surgery by Dr. Ravi. She anticipates a TOTAL HIP ARTHROPLASTY ANTERIOR MODIFIED today. Her hip has been painful for over a year. She denies use of assistive device for ambulation or recent falls.   She has hx of afib s/p ablation. She is followed by Dr. Sinclair, cardiology, and had preop cardiac clearance. Last dose of Eliquis 6/7/21.      Review of Systems:  Constitutional-- No fever, chills or sweats. No fatigue.  CV-- No chest pain, palpitation or syncope. +HTN, afib  Resp-- No SOB, cough, hemoptysis  Skin--No rashes or lesions      Allergies:   Allergies   Allergen Reactions    Accupril [Quinapril Hcl] Cough    Cephalexin Itching    Ciprofloxacin Itching    Triamcinolone Other (See Comments)     Burning sensation         Home Meds:  Medications Prior to Admission   Medication Sig Dispense Refill Last Dose    apixaban (ELIQUIS) 5 MG tablet tablet Take 5 mg by mouth 2 (Two) Times a Day.       Chlorhexidine Gluconate 4 % solution Shower with hibiclens solution as directed for 5 days prior to surgery 237 mL 0     cyclobenzaprine (FLEXERIL) 10 MG tablet Take 10 mg by mouth At Night As Needed for muscle spasms.       diclofenac (VOLTAREN) 1 % gel gel Apply 4 g topically to the appropriate area as directed 4 (Four) Times a Day.       flecainide (TAMBOCOR) 50 MG tablet TAKE 1 TABLET BY MOUTH EVERY 12 (TWELVE) HOURS. 180 tablet 3     furosemide (LASIX) 40 MG tablet Take 40 mg by mouth Daily As Needed (swelling).       HYDROcodone-acetaminophen (NORCO) 7.5-325 MG per tablet Take 1 tablet by mouth Every 6 (Six) Hours As Needed for moderate pain (4-6).       levothyroxine (SYNTHROID, LEVOTHROID) 50 MCG tablet Take 50 mcg by mouth Daily.       metoprolol tartrate (LOPRESSOR) 50 MG tablet TAKE 1 TABLET BY MOUTH TWICE A DAY (Patient taking differently: Take 50 mg by  mouth 2 (Two) Times a Day.) 180 tablet 0     mupirocin (BACTROBAN) 2 % ointment Apply into the nostril(s) as directed by provider 2 (Two) Times a Day. 22 g 0     potassium chloride (K-DUR,KLOR-CON) 20 MEQ CR tablet Take 20 mEq by mouth As Needed (with lasix).            PMH:   Past Medical History:   Diagnosis Date    Arthritis     Back pain     Bilateral kidney stones     Chronic fatigue     Chronic pain     Dental bridge present     Fibromyalgia     Hypertension     Hypothyroidism     Insomnia     Muscle weakness (generalized)     Palpitations     Persistent atrial fibrillation (CMS/HCC) 1/23/2017    A) Diagnosed 2016 by PCP when presenting with extreme fatigue B) CHADS-VASc = 3 (age, female, HTN)  C) 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 Changed to flecainide. F) ECV 11/29/16 on flecainide. Recurrence of afib about 24hrs later. F) TTE 10/20/16 bin    PONV (postoperative nausea and vomiting)     Sciatica of right side     SOB (shortness of breath)     Urination frequency     Wears eyeglasses     reading     PSH:    Past Surgical History:   Procedure Laterality Date    CARDIAC CATHETERIZATION      NO STENTS    CARDIAC ELECTROPHYSIOLOGY PROCEDURE N/A 2/22/2017    Procedure: PVA. DNS meds.;  Surgeon: Miguelito Owusu DO;  Location: [facility] INVASIVE LOCATION;  Service:     CARDIOVERSION      X 2    CARPAL TUNNEL RELEASE Bilateral     COLONOSCOPY      2013    GALLBLADDER SURGERY      GASTRIC BYPASS      REPLACEMENT TOTAL KNEE BILATERAL Bilateral     SINUS SURGERY      X 2    UTERINE SUSPENSION LAPAROSCOPIC         Immunization History:  Influenza: 2020  Pneumococcal: UTD  Tetanus: No  Covid x2: 2021    Social History:   Tobacco:   Social History     Tobacco Use   Smoking Status Never Smoker   Smokeless Tobacco Never Used      Alcohol:     Social History     Substance and  "Sexual Activity   Alcohol Use No         Physical Exam:BP (!) 193/92 (BP Location: Right arm, Patient Position: Lying)   Pulse 58   Temp 97 °F (36.1 °C) (Temporal)   Resp 16   Ht 162.6 cm (64\")   Wt 98.5 kg (217 lb 2.5 oz)   LMP  (LMP Unknown)   SpO2 97%   BMI 37.27 kg/m²       General Appearance:    Alert, cooperative, no distress, appears stated age   Head:    Normocephalic, without obvious abnormality, atraumatic   Lungs:     Clear to auscultation bilaterally, respirations unlabored    Heart:   Regular rate and rhythm, S1 and S2 normal    Abdomen:    Soft without tenderness   Extremities:   Extremities normal, atraumatic, no cyanosis or edema   Skin:   Skin color, texture, turgor normal, no rashes or lesions   Neurologic:   Grossly intact     Results Review:     LABS:  Lab Results   Component Value Date    WBC 9.25 05/27/2021    HGB 10.4 (L) 05/27/2021    HCT 33.8 (L) 05/27/2021    MCV 83.5 05/27/2021     05/27/2021    NEUTROABS 6.21 05/27/2021    GLUCOSE 107 (H) 05/27/2021    BUN 11 05/27/2021    CREATININE 0.72 05/27/2021    EGFRIFNONA 80 05/27/2021     05/27/2021    K 4.2 05/27/2021     05/27/2021    CO2 25.0 05/27/2021    MG 2.2 02/21/2017    CALCIUM 9.4 05/27/2021    ALBUMIN 4.60 02/21/2017    AST 22 02/21/2017    ALT 13 02/21/2017    BILITOT 0.9 02/21/2017       RADIOLOGY:  Imaging Results (Last 72 Hours)       ** No results found for the last 72 hours. **            I reviewed the patient's new clinical results.    Cancer Staging (if applicable)  Cancer Patient: __ yes __no __unknown; If yes, clinical stage T:__ N:__M:__, stage group or __N/A      Impression: Primary osteoarthritis right hip      Plan: TOTAL HIP ARTHROPLASTY ANTERIOR MODIFIED      TYLER Brock   6/10/2021   08:14 EDT    Agree with above.  Plan for right total hip arthroplasty.    Silas Ravi MD  06/10/21  09:55 EDT    "

## 2021-06-10 NOTE — THERAPY EVALUATION
Patient Name: Chelsey Herrera  : 1950    MRN: [id number]                              Today's Date: 6/10/2021       Admit Date: 6/10/2021    Visit Dx:     ICD-10-CM ICD-9-CM   1. Primary osteoarthritis of right hip  M16.11 715.15     Patient Active Problem List   Diagnosis   • Persistent atrial fibrillation (CMS/HCC)   • Hypertension   • Obesity, Class II, BMI 35-39.9   • Fibromyalgia   • Nephrolithiasis   • History of surgery   • Primary osteoarthritis of right hip   • Status post total replacement of right hip   • Chronic anticoagulation   • Elevated hemoglobin A1c     Past Medical History:   Diagnosis Date   • Arthritis    • Back pain    • Bilateral kidney stones    • Chronic fatigue    • Chronic pain    • Dental bridge present    • Fibromyalgia    • Hypertension    • Hypothyroidism    • Insomnia    • Muscle weakness (generalized)    • Palpitations    • Persistent atrial fibrillation (CMS/HCC) 2017    A) Diagnosed  by PCP when presenting with extreme fatigue B) CHADS-VASc = 3 (age, female, HTN)  C) Failure of Flecainide and Sotalol with recurrent afib  D) BERTT 11/10/2016 w/ EF 36-40%, normal sized LA, mild MR & TR  E) ECV 11/10/16 on sotalol with early recurrence of afib (minutes). Changed to flecainide. F) ECV 16 on flecainide. Recurrence of afib about 24hrs later. F) TTE 10/20/16 bin   • PONV (postoperative nausea and vomiting)    • Sciatica of right side    • SOB (shortness of breath)    • Urination frequency    • Wears eyeglasses     reading     Past Surgical History:   Procedure Laterality Date   • CARDIAC CATHETERIZATION      NO STENTS   • CARDIAC ELECTROPHYSIOLOGY PROCEDURE N/A 2017    Procedure: PVA. DNS meds.;  Surgeon: Miguelito Owusu DO;  Location: [facility] INVASIVE LOCATION;  Service:    • CARDIOVERSION      X 2   • CARPAL TUNNEL RELEASE Bilateral    • COLONOSCOPY         • GALLBLADDER SURGERY     • GASTRIC BYPASS     • REPLACEMENT TOTAL KNEE BILATERAL Bilateral    •  SINUS SURGERY      X 2   • UTERINE SUSPENSION LAPAROSCOPIC       General Information     Row Name 06/10/21 1535          Physical Therapy Time and Intention    Document Type  evaluation  -PALMIRA     Mode of Treatment  physical therapy;individual therapy  -     Row Name 06/10/21 1535          General Information    Patient Profile Reviewed  yes  -PALMIRA     Prior Level of Function  min assist:;all household mobility;transfer;bed mobility;ADL's  -PALMIRA     Existing Precautions/Restrictions  fall;right;hip, anterior;other (see comments) Ant-mod precautions  -PALMIRA     Barriers to Rehab  none identified  -PALMIRA     Row Name 06/10/21 1535          Living Environment    Lives With  spouse  -     Row Name 06/10/21 1535          Home Main Entrance    Number of Stairs, Main Entrance  four  -PALMIRA     Stair Railings, Main Entrance  railing on left side (ascending)  -     Row Name 06/10/21 1535          Stairs Within Home, Primary    Stairs, Within Home, Primary  0  -PALMIRA     Number of Stairs, Within Home, Primary  none  -PALMIRA     Row Name 06/10/21 1535          Cognition    Orientation Status (Cognition)  oriented x 4  -PALMIRA     Row Name 06/10/21 1535          Safety Issues, Functional Mobility    Safety Issues Affecting Function (Mobility)  safety precaution awareness;safety precautions follow-through/compliance  -PALMIRA     Impairments Affecting Function (Mobility)  endurance/activity tolerance;strength;pain;range of motion (ROM);balance  -PALMIRA       User Key  (r) = Recorded By, (t) = Taken By, (c) = Cosigned By    Initials Name Provider Type    PALMIRA Juan R Mclean PT Physical Therapist        Mobility     Row Name 06/10/21 1535          Bed Mobility    Bed Mobility  scooting/bridging;supine-sit  -PALMIRA     Scooting/Bridging [place] (Bed Mobility)  verbal cues;contact guard  -PALMIRA     Supine-Sit [place] (Bed Mobility)  verbal cues;contact guard  -PALMIRA     Assistive Device (Bed Mobility)  bed rails;head of bed elevated  -PALMIRA     Comment (Bed Mobility)   Verbal cues for LE sequencing off of EOB and trunk control into sitting  -     Row Name 06/10/21 1535          Transfers    Comment (Transfers)  Verbal cues for safe hand placement during standing/sitting and moving R LE out for comfort prior to sitting; min A for toilet transfer  -     Row Name 06/10/21 1535          Sit-Stand Transfer    Sit-Stand [place] (Transfers)  verbal cues;minimum assist (75% patient effort)  -     Assistive Device (Sit-Stand Transfers)  walker, front-wheeled  -     Row Name 06/10/21 1535          Gait/Stairs (Locomotion)    [place] Level (Gait)  verbal cues;minimum assist (75% patient effort);contact guard;2 person assist  -     Assistive Device (Gait)  walker, front-wheeled  -     Distance in Feet (Gait)  360 feet  -     Deviations/Abnormal Patterns (Gait)  bilateral deviations;antalgic;gait speed decreased;arabella decreased;stride length decreased  -     Bilateral Gait Deviations  forward flexed posture  -     Right Sided Gait Deviations  heel strike decreased;weight shift ability decreased  -     [place] Level (Stairs)  not tested  -     Comment (Gait/Stairs)  Pt ambulated with step through pattern and decreased speed. Verbal cues for maintaining upright posture, body within walker, increase step length, WB through LEs, and decreased WB through UEs. Pt experiencining slight R knee buckling initially during gait, however, none noted as gait progressed. Pt requiring min Ax2 for balance initially during gait, however, progressed to CGA x2 for safety. Gait limited by fatigue and weakness.  -     Row Name 06/10/21 1535          Mobility    Extremity Weight-bearing Status  right lower extremity  -     Right Lower Extremity (Weight-bearing Status)  weight-bearing as tolerated (WBAT)  -       User Key  (r) = Recorded By, (t) = Taken By, (c) = Cosigned By    Initials Name Provider Type    Juan R Parker, PT Physical Therapist        Obj/Interventions      [facility] Name 06/10/21 1535          Range of Motion Comprehensive    General Range of Motion  lower extremity range of motion deficits identified  -     Comment, General Range of Motion  R LE AROM impaired 25%; L LE AROM WFL; able to actively DF/PF  -[facility] Name 06/10/21 1535          Strength Comprehensive (MMT)    General Manual Muscle Testing (MMT) Assessment  lower extremity strength deficits identified  -     Comment, General Manual Muscle Testing (MMT) Assessment  R LE functionally 4-/5; L LE functionally 4/5  -[facility] Name 06/10/21 1535          Motor Skills    Therapeutic Exercise  hip;knee;ankle  -[facility] Name 06/10/21 [facility]5          Hip (Therapeutic Exercise)    Hip (Therapeutic Exercise)  isometric exercises  -     Hip Isometrics (Therapeutic Exercise)  gluteal sets;10 repetitions  -[facility] Name 06/10/21 [facility]          Knee (Therapeutic Exercise)    Knee (Therapeutic Exercise)  isometric exercises  -     Knee Isometrics (Therapeutic Exercise)  quad sets;10 repetitions  -PALMIRA     Row Name 06/10/21 [facility]5          Ankle (Therapeutic Exercise)    Ankle (Therapeutic Exercise)  AROM (active range of motion)  -     Ankle AROM (Therapeutic Exercise)  bilateral;dorsiflexion;plantarflexion;10 repetitions  -PALMIRA     Row Name 06/10/21 1535          Sensory Assessment (Somatosensory)    Sensory Assessment (Somatosensory)  LE sensation intact  -       User Key  (r) = Recorded By, (t) = Taken By, (c) = Cosigned By    Initials Name Provider Type    Juan R Parker, PT Physical Therapist        Goals/Plan     [facility] Name 06/10/21 1535          Bed Mobility Goal 1 (PT)    Activity/Assistive Device (Bed Mobility Goal 1, PT)  sit to supine/supine to sit  -     [place] Level/Cues Needed (Bed Mobility Goal 1, PT)  modified independence  -     Time Frame (Bed Mobility Goal 1, PT)  long term goal (LTG);3 days  -[facility] Name 06/10/21 1535          Transfer Goal 1 (PT)    Activity/Assistive Device (Transfer  Goal 1, PT)  sit-to-stand/stand-to-sit;walker, rolling  -PALMIRA     [place] Level/Cues Needed (Transfer Goal 1, PT)  contact guard assist  -PALMIRA     Time Frame (Transfer Goal 1, PT)  long term goal (LTG);3 days  -PALMIRA     Row Name 06/10/21 1535          Gait Training Goal 1 (PT)    Activity/Assistive Device (Gait Training Goal 1, PT)  gait (walking locomotion);walker, rolling  -PALMIRA     [place] Level (Gait Training Goal 1, PT)  modified independence  -PALMIRA     Distance (Gait Training Goal 1, PT)  150 feet  -PALMIRA     Time Frame (Gait Training Goal 1, PT)  long term goal (LTG);3 days  -PALMIRA     Row Name 06/10/21 1535          Stairs Goal 1 (PT)    Activity/Assistive Device (Stairs Goal 1, PT)  stairs, all skills;using handrail, left;cane, straight  -PALMIRA     [place] Level/Cues Needed (Stairs Goal 1, PT)  contact guard assist  -PALMIRA     Number of Stairs (Stairs Goal 1, PT)  4  -PALMIRA     Time Frame (Stairs Goal 1, PT)  long term goal (LTG);3 days  -PALMIRA       User Key  (r) = Recorded By, (t) = Taken By, (c) = Cosigned By    Initials Name Provider Type    Juan R Parker, PT Physical Therapist        Clinical Impression     Row Name 06/10/21 1535          Pain    Additional Documentation  Pain Scale: Numbers Pre/Post-Treatment (Group)  -PALMIRA     Row Name 06/10/21 1535          Pain Scale: Numbers Pre/Post-Treatment    Pretreatment Pain Rating  3/10  -PALMIRA     Posttreatment Pain Rating  4/10  -PALMIRA     Pain Location - Side  Bilateral  -PALMIRA     Pain Location - Orientation  lower  -PALMIRA     Pain Location  back  -PALMIRA     Pain Intervention(s)  Cold applied;Repositioned;Ambulation/increased activity  -     Row Name 06/10/21 1535          Therapy Assessment/Plan (PT)    Patient/Family Therapy Goals Statement (PT)  To return home  -PALMIRA     Rehab Potential (PT)  good, to achieve stated therapy goals  -     Criteria for Skilled Interventions Met (PT)  yes;meets criteria;skilled treatment is necessary  -     Row Name 06/10/21 7735           Positioning and Restraints    Pre-Treatment Position  in bed  -PALMIRA     Post Treatment Position  chair  -PALMIRA     In Chair  notified nsg;reclined;call light within reach;encouraged to call for assist;exit alarm on;legs elevated;compression device  -       User Key  (r) = Recorded By, (t) = Taken By, (c) = Cosigned By    Initials Name Provider Type    Juan R Parker, PT Physical Therapist        Outcome Measures     Row Name 06/10/21 1535          How much help from another person do you currently need...    Turning from your back to your side while in flat bed without using bedrails?  3  -PALMIRA     Moving from lying on back to sitting on the side of a flat bed without bedrails?  3  -PALMIRA     Moving to and from a bed to a chair (including a wheelchair)?  2  -PALMIRA     Standing up from a chair using your arms (e.g., wheelchair, bedside chair)?  3  -PALMIRA     Climbing 3-5 steps with a railing?  2  -PALMIRA     To walk in hospital room?  2  -PALMIRA     AM-PAC 6 Clicks Score (PT)  15  -     Row Name 06/10/21 1535          PADD    Diagnosis  2  -PALMIRA     Gender  1  -PALMIRA     Age Group  1  -PALMIRA     Gait Distance  1  -PALMIRA     Assist Level  0  -PALMIRA     Home Support  3  -PALMIRA     PADD Score  8  -PALMIRA     Patient Preference  home with home health  -PALMIRA     Prediction by PADD Score  directly home (with home health or out-patient rehab)  -     Row Name 06/10/21 1535          Functional Assessment    Outcome Measure Options  AM-PAC 6 Clicks Basic Mobility (PT);PADD  -       User Key  (r) = Recorded By, (t) = Taken By, (c) = Cosigned By    Initials Name Provider Type    Juan R Parker, PT Physical Therapist        Physical Therapy Education                 Title: PT OT SLP Therapies (Done)     Topic: Physical Therapy (Done)     Point: Mobility training (Done)     Learning Progress Summary           Patient Acceptance, E,D, VU by PALMIRA at 6/10/2021 1535    Comment: Educated on safe sequencing with bed mobility, ambulatory/toilet transfers, and gait. Reviewed HEP  and hip precautions.                   Point: Home exercise program (Done)     Learning Progress Summary           Patient Acceptance, E,D, VU by PALMIRA at 6/10/2021 1535    Comment: Educated on safe sequencing with bed mobility, ambulatory/toilet transfers, and gait. Reviewed HEP and hip precautions.                   Point: Body mechanics (Done)     Learning Progress Summary           Patient Acceptance, E,D, VU by PALMIRA at 6/10/2021 1535    Comment: Educated on safe sequencing with bed mobility, ambulatory/toilet transfers, and gait. Reviewed HEP and hip precautions.                   Point: Precautions (Done)     Learning Progress Summary           Patient Acceptance, E,D, VU by PALMIRA at 6/10/2021 1535    Comment: Educated on safe sequencing with bed mobility, ambulatory/toilet transfers, and gait. Reviewed HEP and hip precautions.                               User Key     Initials Effective Dates Name Provider Type Discipline     09/10/19 -  Juan R Mclean, PT Physical Therapist PT              PT Recommendation and Plan  Planned Therapy Interventions (PT): balance training, bed mobility training, gait training, home exercise program, patient/family education, transfer training, stair training, strengthening  Plan of Care Reviewed With: patient  Progress: improving  Outcome Summary: PT eval complete. Pt ambulated 360 feet using RW and min Ax2 for balance initially, progressing to CGA x2 for safety. Pt experiencining slight R knee buckling initially during gait, however, none noted as gait progressed. Bed mobility performed with supervision, STS and toilet transfers with min A. Reviewed HEP and hip precautions. PADD score = 8. Recommend pt d/c home with assist and HHPT, pending improvement with mobility tasks and when medically appropriate.     Time Calculation:   PT Charges     Row Name 06/10/21 1535             Time Calculation    Start Time  1535  -      PT Received On  06/10/21  -      PT Goal Re-Cert Due Date   06/20/21  -PALMIRA         Time Calculation- PT    Total Timed Code Minutes- PT  14 minute(s)  -PALMIRA         Timed Charges    77085 - PT Therapeutic Exercise Minutes  2  -PALMIRA      91899 - Gait Training Minutes   8  -PALMIRA      04156 - PT Therapeutic Activity Minutes  4  -PALMIRA         Untimed Charges    PT Eval/Re-eval Minutes  36  -PALMIRA         Total Minutes    Timed Charges Total Minutes  14  -PALMIRA      Untimed Charges Total Minutes  36  -PALMIRA       Total Minutes  50  -PALMIRA        User Key  (r) = Recorded By, (t) = Taken By, (c) = Cosigned By    Initials Name Provider Type    Juan R Parker, PT Physical Therapist        Therapy Charges for Today     Code Description Service Date Service Provider Modifiers Qty    52705928308 HC GAIT TRAINING EA 15 MIN 6/10/2021 Juan R Mclean, PT GP 1    29233078189 HC PT EVAL LOW COMPLEXITY 3 6/10/2021 Juan R Mclean, PT GP 1    71920324314 HC PT THER SUPP EA 15 MIN 6/10/2021 Juan R Mclean, PT GP 2          PT G-Codes  Outcome Measure Options: AM-PAC 6 Clicks Basic Mobility (PT), PADD  AM-PAC 6 Clicks Score (PT): 15    Juan R Mclean PT  6/10/2021

## 2021-06-10 NOTE — H&P
Patient Name: Chelsey Herrera  MRN: [id number]  : 1950  DOS: 6/10/2021    Attending: Silas Ravi MD    Primary Care Provider: Everardo Mccormack MD      Chief complaint: Right hip pain    Subjective   Patient is a pleasant 70 y.o. female presented for scheduled surgery by Dr. Ravi.  Patient underwent right total hip arthroplasty under spinal anesthesia.  Surgery went well and she will be admitted for further medical management.  Patient reports chronic pain in her back and bilateral hips for which she has controlled with Norco.  She is on chronic anticoagulation with Eliquis.    When seen postop patient is doing well, pain is tolerable.  No nausea, vomiting, shortness of breath, or chest pain.    Allergies:  Allergies   Allergen Reactions   • Accupril [Quinapril Hcl] Cough   • Cephalexin Itching   • Ciprofloxacin Itching   • Triamcinolone Other (See Comments)     Burning sensation       Meds:  Medications Prior to Admission   Medication Sig Dispense Refill Last Dose   • flecainide (TAMBOCOR) 50 MG tablet TAKE 1 TABLET BY MOUTH EVERY 12 (TWELVE) HOURS. 180 tablet 3 6/10/2021 at 0600   • HYDROcodone-acetaminophen (NORCO) 7.5-325 MG per tablet Take 1 tablet by mouth Every 6 (Six) Hours As Needed for moderate pain (4-6).   2021 at 2100   • levothyroxine (SYNTHROID, LEVOTHROID) 50 MCG tablet Take 50 mcg by mouth Daily.   2021 at AM   • metoprolol tartrate (LOPRESSOR) 50 MG tablet TAKE 1 TABLET BY MOUTH TWICE A  tablet 0 6/10/2021 at 0600   • apixaban (ELIQUIS) 5 MG tablet tablet Take 5 mg by mouth 2 (Two) Times a Day.   2021 at 0900   • cyclobenzaprine (FLEXERIL) 10 MG tablet Take 10 mg by mouth At Night As Needed for muscle spasms.   2021   • diclofenac (VOLTAREN) 1 % gel gel Apply 4 g topically to the appropriate area as directed 4 (Four) Times a Day.   2021   • furosemide (LASIX) 40 MG tablet Take 40 mg by mouth Daily As Needed (swelling).   More than a month at Unknown time    • potassium chloride (K-DUR,KLOR-CON) 20 MEQ CR tablet Take 20 mEq by mouth As Needed (with lasix).   More than a month at Unknown time         History:   Past Medical History:   Diagnosis Date   • Arthritis    • Back pain    • Bilateral kidney stones    • Chronic fatigue    • Chronic pain    • Dental bridge present    • Fibromyalgia    • Hypertension    • Hypothyroidism    • Insomnia    • Muscle weakness (generalized)    • Palpitations    • Persistent atrial fibrillation (CMS/HCC) 1/23/2017    A) Diagnosed 2016 by PCP when presenting with extreme fatigue B) CHADS-VASc = 3 (age, female, HTN)  C) 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 Changed to flecainide. F) ECV 11/29/16 on flecainide. Recurrence of afib about 24hrs later. F) TTE 10/20/16 bin   • PONV (postoperative nausea and vomiting)    • Sciatica of right side    • SOB (shortness of breath)    • Urination frequency    • Wears eyeglasses     reading     Past Surgical History:   Procedure Laterality Date   • CARDIAC CATHETERIZATION      NO STENTS   • CARDIAC ELECTROPHYSIOLOGY PROCEDURE N/A 2/22/2017    Procedure: PVA. DNS meds.;  Surgeon: Miguelito Owusu DO;  Location: [facility] INVASIVE LOCATION;  Service:    • CARDIOVERSION      X 2   • CARPAL TUNNEL RELEASE Bilateral    • COLONOSCOPY      2013   • GALLBLADDER SURGERY     • GASTRIC BYPASS     • REPLACEMENT TOTAL KNEE BILATERAL Bilateral    • SINUS SURGERY      X 2   • UTERINE SUSPENSION LAPAROSCOPIC       Family History   Problem Relation Age of Onset   • Heart attack Mother    • Heart attack Father    • Heart disease Brother    • Diabetes Brother    • Kidney disease Brother      Social History     Tobacco Use   • Smoking status: Never Smoker   • Smokeless tobacco: Never Used   Vaping Use   • Vaping Use: Never used   Substance Use Topics   • Alcohol use: No   •  "Drug use: No   She is  and lives with .  Works for [employer].    Review of Systems  All systems were reviewed and negative except for:  Musculoskeletal: positive for  back pain and joint pain    Vital Signs  BP (!) 128/101 (BP Location: Right arm, Patient Position: Lying)   Pulse 63   Temp 97.3 °F (36.3 °C) (Oral)   Resp 18   Ht 162.6 cm (64\")   Wt 98.5 kg (217 lb 2.5 oz)   LMP  (LMP Unknown)   SpO2 100%   BMI 37.27 kg/m²     Physical Exam:    General Appearance:    Alert, cooperative, in no acute distress   Head:    Normocephalic, without obvious abnormality, atraumatic   Eyes:            Lids and lashes normal, conjunctivae and sclerae normal, no   icterus, no pallor, corneas clear    Ears:    Ears appear intact with no abnormalities noted   Throat:   No oral lesions, no thrush, oral mucosa moist   Neck:   No adenopathy, supple, trachea midline, no thyromegaly         Lungs:     Clear to auscultation,respirations regular, even and   unlabored. No wheezes or rales.    Heart:    Regular rhythm and normal rate, normal S1 and S2, no murmur, no gallop   Abdomen:     Normal bowel sounds, no masses, no organomegaly, soft        non-tender, non-distended, no guarding, no rebound                 tenderness.  Obese.   Genitalia:    Deferred   Extremities:  RLE, CDI dressing right hip.  Bilateral leg numbness.   Pulses:   Pulses palpable and equal bilaterally   Skin:   No bleeding, bruising or rash   Neurologic:   Cranial nerves 2 - 12 grossly intact.      I reviewed the patient's new clinical results.             Invalid input(s): NEUTOPHILPCT,  EOSPCT  Results from last 7 days   Lab Units 06/10/21  0805   POTASSIUM mmol/L 3.7     Lab Results   Component Value Date    HGBA1C 6.40 (H) 05/27/2021   Results for ANIBAL MORAN (MRN [id number]) as of 6/10/2021 15:15   Ref. Range 5/27/2021 13:06   Glucose Latest Ref Range: 65 - 99 mg/dL 107 (H)   Sodium Latest Ref Range: 136 - 145 mmol/L 137   Potassium " Latest Ref Range: 3.5 - 5.2 mmol/L 4.2   CO2 Latest Ref Range: 22.0 - 29.0 mmol/L 25.0   Chloride Latest Ref Range: 98 - 107 mmol/L 100   Anion Gap Latest Ref Range: 5.0 - 15.0 mmol/L 12.0   Creatinine Latest Ref Range: 0.57 - 1.00 mg/dL 0.72   BUN Latest Ref Range: 8 - 23 mg/dL 11   BUN/Creatinine Ratio Latest Ref Range: 7.0 - 25.0  15.3   Calcium Latest Ref Range: 8.6 - 10.5 mg/dL 9.4   eGFR Non  Am Latest Ref Range: >60 mL/min/1.73 80   Hemoglobin A1C Latest Ref Range: 4.80 - 5.60 % 6.40 (H)   C-Reactive Protein Latest Ref Range: 0.00 - 0.50 mg/dL 0.89 (H)   Protime Latest Ref Range: 11.4 - 14.4 Seconds 15.1 (H)   INR Latest Ref Range: 0.85 - 1.16  1.23 (H)   PTT Latest Ref Range: 22.0 - 39.0 seconds 32.7   WBC Latest Ref Range: 3.40 - 10.80 10*3/mm3 9.25   RBC Latest Ref Range: 3.77 - 5.28 10*6/mm3 4.05   Hemoglobin Latest Ref Range: 12.0 - 15.9 g/dL 10.4 (L)   Hematocrit Latest Ref Range: 34.0 - 46.6 % 33.8 (L)   RDW Latest Ref Range: 12.3 - 15.4 % 15.5 (H)   MCV Latest Ref Range: 79.0 - 97.0 fL 83.5   MCH Latest Ref Range: 26.6 - 33.0 pg 25.7 (L)   MCHC Latest Ref Range: 31.5 - 35.7 g/dL 30.8 (L)   MPV Latest Ref Range: 6.0 - 12.0 fL 10.3   Platelets Latest Ref Range: 140 - 450 10*3/mm3 271   RDW-SD Latest Ref Range: 37.0 - 54.0 fl 47.2           Assessment and Plan:       Status post total replacement of right hip    Hypertension    Obesity, Class II, BMI 35-39.9    Fibromyalgia    Primary osteoarthritis of right hip    Chronic anticoagulation    Elevated hemoglobin A1c      Plan:    1. PT/OT, Weight bearing as tolerated RLE.  2. Pain control-prns  3. IS-encourage  4. DVT proph- Mechanicals and Eliquis  5. Bowel regimen  6. Resume home medications as appropriate  7. Monitor post-op labs  8. DC planning for home, likely tomorrow    - Hypertension:  Resume home medications as appropriate, formulary substitution when indicated.  Holding parameters.  Prn medications for elevated blood pressure.    -Chronic  anticoagulation  Continue eliquis at half of home dose    -Elevated A1c  Explained to patient implication of A1C elevation, need to modify diet and increase activity, and f/u with PCP.          Dragon disclaimer:  Part of this encounter note is an electronic transcription/translation of spoken language to printed text. The electronic translation of spoken language may permit erroneous, or at times, nonsensical words or phrases to be inadvertently transcribed; Although I have reviewed the note for such errors, some may still exist.    Yanely Singh MD  06/10/21  15:16 EDT

## 2021-06-11 VITALS
HEIGHT: 64 IN | SYSTOLIC BLOOD PRESSURE: 164 MMHG | DIASTOLIC BLOOD PRESSURE: 60 MMHG | OXYGEN SATURATION: 99 % | RESPIRATION RATE: 20 BRPM | WEIGHT: 217.15 LBS | BODY MASS INDEX: 37.07 KG/M2 | TEMPERATURE: 98.4 F | HEART RATE: 76 BPM

## 2021-06-11 PROBLEM — G89.18 POSTOPERATIVE PAIN: Status: ACTIVE | Noted: 2021-06-11

## 2021-06-11 PROBLEM — D64.9 ANEMIA: Status: ACTIVE | Noted: 2021-06-11

## 2021-06-11 LAB
ANION GAP SERPL CALCULATED.3IONS-SCNC: 10 MMOL/L (ref 5–15)
BUN SERPL-MCNC: 9 MG/DL (ref 8–23)
BUN/CREAT SERPL: 12.7 (ref 7–25)
CALCIUM SPEC-SCNC: 8.9 MG/DL (ref 8.6–10.5)
CHLORIDE SERPL-SCNC: 104 MMOL/L (ref 98–107)
CO2 SERPL-SCNC: 26 MMOL/L (ref 22–29)
CREAT SERPL-MCNC: 0.71 MG/DL (ref 0.57–1)
DEPRECATED RDW RBC AUTO: 47.2 FL (ref 37–54)
ERYTHROCYTE [DISTWIDTH] IN BLOOD BY AUTOMATED COUNT: 15.3 % (ref 12.3–15.4)
GFR SERPL CREATININE-BSD FRML MDRD: 81 ML/MIN/1.73
GLUCOSE SERPL-MCNC: 131 MG/DL (ref 65–99)
HCT VFR BLD AUTO: 26.8 % (ref 34–46.6)
HGB BLD-MCNC: 8.2 G/DL (ref 12–15.9)
MCH RBC QN AUTO: 25.7 PG (ref 26.6–33)
MCHC RBC AUTO-ENTMCNC: 30.6 G/DL (ref 31.5–35.7)
MCV RBC AUTO: 84 FL (ref 79–97)
PLATELET # BLD AUTO: 292 10*3/MM3 (ref 140–450)
PMV BLD AUTO: 10 FL (ref 6–12)
POTASSIUM SERPL-SCNC: 4 MMOL/L (ref 3.5–5.2)
RBC # BLD AUTO: 3.19 10*6/MM3 (ref 3.77–5.28)
SODIUM SERPL-SCNC: 140 MMOL/L (ref 136–145)
WBC # BLD AUTO: 12.71 10*3/MM3 (ref 3.4–10.8)

## 2021-06-11 PROCEDURE — 99024 POSTOP FOLLOW-UP VISIT: CPT | Performed by: ORTHOPAEDIC SURGERY

## 2021-06-11 PROCEDURE — 97110 THERAPEUTIC EXERCISES: CPT

## 2021-06-11 PROCEDURE — 85027 COMPLETE CBC AUTOMATED: CPT | Performed by: ORTHOPAEDIC SURGERY

## 2021-06-11 PROCEDURE — 97535 SELF CARE MNGMENT TRAINING: CPT | Performed by: OCCUPATIONAL THERAPIST

## 2021-06-11 PROCEDURE — 97116 GAIT TRAINING THERAPY: CPT

## 2021-06-11 PROCEDURE — 97165 OT EVAL LOW COMPLEX 30 MIN: CPT | Performed by: OCCUPATIONAL THERAPIST

## 2021-06-11 PROCEDURE — 80048 BASIC METABOLIC PNL TOTAL CA: CPT | Performed by: INTERNAL MEDICINE

## 2021-06-11 RX ORDER — ACETAMINOPHEN 500 MG
1000 TABLET ORAL EVERY 8 HOURS
Qty: 42 TABLET | Refills: 0
Start: 2021-06-11 | End: 2021-06-18

## 2021-06-11 RX ORDER — DOCUSATE SODIUM 100 MG/1
100 CAPSULE, LIQUID FILLED ORAL 2 TIMES DAILY
Qty: 30 CAPSULE | Refills: 0 | Status: SHIPPED | OUTPATIENT
Start: 2021-06-11 | End: 2021-06-26

## 2021-06-11 RX ADMIN — MELOXICAM 15 MG: 7.5 TABLET ORAL at 08:31

## 2021-06-11 RX ADMIN — FLECAINIDE ACETATE 50 MG: 50 TABLET ORAL at 08:31

## 2021-06-11 RX ADMIN — CLINDAMYCIN PHOSPHATE 900 MG: 900 INJECTION, SOLUTION INTRAVENOUS at 02:22

## 2021-06-11 RX ADMIN — METOPROLOL TARTRATE 50 MG: 50 TABLET, FILM COATED ORAL at 08:31

## 2021-06-11 RX ADMIN — APIXABAN 2.5 MG: 2.5 TABLET, FILM COATED ORAL at 08:31

## 2021-06-11 RX ADMIN — OXYCODONE 5 MG: 5 TABLET ORAL at 09:41

## 2021-06-11 RX ADMIN — OXYCODONE 5 MG: 5 TABLET ORAL at 05:48

## 2021-06-11 RX ADMIN — LEVOTHYROXINE SODIUM 50 MCG: 50 TABLET ORAL at 05:48

## 2021-06-11 RX ADMIN — OXYCODONE 5 MG: 5 TABLET ORAL at 13:39

## 2021-06-11 NOTE — CASE MANAGEMENT/SOCIAL WORK
Discharge Planning Assessment  [facility]     Patient Name: Chelsey Herrera  MRN: [id number]  Today's Date: 6/11/2021    Admit Date: 6/10/2021    Discharge Needs Assessment     Row Name 06/11/21 1115       Living Environment    Lives With  spouse    Name(s) of Who Lives With Patient  Yash Herrera, ph [phone]    Current Living Arrangements  home/apartment/condo    Family Caregiver if Needed  spouse    Quality of Family Relationships  helpful;involved;supportive    Able to Return to Prior Arrangements  yes       Resource/Environmental Concerns    Resource/Environmental Concerns  none    Transportation Concerns  car, none       Transition Planning    Patient/Family Anticipates Transition to  home with family    Patient/Family Anticipated Services at Transition  outpatient care;durable medical equipment    Transportation Anticipated  family or friend will provide       Discharge Needs Assessment    Readmission Within the Last 30 Days  no previous admission in last 30 days    Equipment Currently Used at Home  none    Equipment Needed After Discharge  walker, rolling    Outpatient/Agency/Support Group Needs  outpatient therapy    Provided Post Acute Provider List?  Yes    Post Acute Provider List  DME Supplier;Outpatient Therapy    Provided Post Acute Provider Quality & Resource List?  Yes    Delivered To  Patient    Method of Delivery  In person    Patient's Choice of Community Agency(s)  [facility] and [facility]        Discharge Plan     Row Name 06/11/21 1116       Plan    Plan  Home with 's assistance, [facility] and a rolling walker from [facility]    Patient/Family in Agreement with Plan  yes    Plan Comments  Met with Ms. Herrera and her , Yash, at the bedside for discharge planning.  Ms. Herrera lives with her  in a one story home, 4 steps to enter, in [facility].  She has been evaluated by PT and OT and is ambulating with  a rolling walker.  She requested a walker for home use and did not have preference for provider.  Contacted [facility] and they will deliver a walker to the hospital room.    Discussed physical therapy and Ms. Herrera requested [facility].  Called and faxed the referral to Ivy at [facility], ph [phone], fax [phone].  Requested that Ms. Herrera call the outpatient PT and schedule her appointment.    Ms. Herrera states that her  can assist her at home as needed and will be transporting her home when discharged.   CM will continue to follow.        Continued Care and Services - Admitted Since 6/10/2021     Therapy     Service Provider Request Status Selected Services Address Phone Fax Patient Preferred    [facility]-OUT[facility]  Pending - No Request Sent N/A [address]  [place] [address] [phone] -- --              Expected Discharge Date and Time     Expected Discharge Date Expected Discharge Time    Jun 11, 2021         Demographic Summary     Row Name 06/11/21 1114       General Information    Admission Type  inpatient    Arrived From  home    Reason for Consult  discharge planning    General Information Comments  PCP:  Everardo Mccormack       Contact Information    Permission Granted to Share Info With          Functional Status     Row Name 06/11/21 1114       Functional Status    Usual Activity Tolerance  moderate    Current Activity Tolerance  -- See PT and OT notes.       Functional Status, IADL    Medications  independent    Meal Preparation  independent    Housekeeping  independent    Laundry  independent    Shopping  independent       Mental Status    General Appearance WDL  WDL        Psychosocial    No documentation.       Abuse/Neglect    No documentation.       Legal    No documentation.       Substance Abuse    No documentation.       Patient Forms    No documentation.           Natasha Stanley, RN

## 2021-06-11 NOTE — DISCHARGE PLACEMENT REQUEST
"Anibal Moran BRI (70 y.o. Female)   PT orders.  From Natasha MCKINNEY BSN, Case Management,  [phone]    Date of Birth Social Security Number Address Home Phone MRN    1950  [address] [address] [phone] [id number]    Oriental orthodox Marital Status          Cheondoism        Admission Date Admission Type Admitting Provider Attending Provider Department, Room/Bed    6/10/21 Elective Silas Ravi MD Kirk, Michael E, MD [facility] 3G, S357/1    Discharge Date Discharge Disposition Discharge Destination                       Attending Provider: Silas Ravi MD    Allergies: Accupril [Quinapril Hcl], Cephalexin, Ciprofloxacin, Triamcinolone    Isolation: None   Infection: None   Code Status: CPR    Ht: 162.6 cm (64\")   Wt: 98.5 kg (217 lb 2.5 oz)    Admission Cmt: None   Principal Problem: Status post total replacement of right hip [Z96.641]                 Active Insurance as of 6/10/2021     Primary Coverage     Payor Plan Insurance Group Employer/Plan Group    MEDICARE MEDICARE A & B      Payor Plan Address Payor Plan Phone Number Payor Plan Fax Number Effective Dates    [address] [phone]  8/1/2013 - None Entered    [facility] [id number]       Subscriber Name Subscriber Birth Date Member ID       ANIBAL MORAN 1950 [id number]           Secondary Coverage     Payor Plan Insurance Group Employer/Plan Group    ANTHEM BLUE CROSS ANTHEM BLUE CROSS BLUE SHIELD PPO [id number]     Payor Plan Address Payor Plan Phone Number Payor Plan Fax Number Effective Dates    [address] [phone]  6/1/2018 - None Entered    [facility] [id number]       Subscriber Name Subscriber Birth Date Member ID       ANIBAL MORAN BRI 1950 [id number]                   Emergency Contacts      (Rel.) Home Phone Work Phone Mobile Phone    Yash Moran (Spouse) [phone] -- [phone]        [facility] 3G  [address] " [address]  Phone:  [phone]  Fax:   Date: 2021      Ambulatory Referral to Physical Therapy Evaluate and treat, Ortho; Full weight bearing     Patient:  Chelsey Herrera MRN:  [id number]   [address] [address] :  1950  SSN:    Phone: [phone] Sex:  F      INSURANCE PAYOR PLAN GROUP # SUBSCRIBER ID   Primary:  Secondary:    MEDICARE ANTHEM BLUE CROSS [id number]  [id number]    [id number]  [id number]      Referring Provider Information:  SILAS RAVI Phone: [phone] Fax:       Referral Information:   # Visits:  1 Referral Type: Physical Therapy [AE1]   Urgency:  Routine Referral Reason: Specialty Services Required   Start Date: 2021 End Date:  To be determined by Insurer   Diagnosis: Primary osteoarthritis of right hip (M16.11 [ICD-10-CM] 715.15 [ICD-9-CM])  Status post total replacement of right hip (Z96.641 [ICD-10-CM] V43.64 [ICD-9-CM])      Refer to Dept:   Refer to Provider:   Refer to Facility:       Specialty needed: Evaluate and treat  Specialty needed: Ortho  Weight Bearing Status: Full weight bearing     This document serves as a request of services and does not constitute Insurance authorization or approval of services.  To determine eligibility, please contact the members Insurance carrier to verify and review coverage.     If you have medical questions regarding this request for services. Please contact [address] at [phone] during normal business hours.       Authorizing Provider:Silas Ravi MD  Authorizing Provider's NPI: [id number]  Order Entered By: Natasha Stanley RN 2021 11:11 AM     Electronically signed by: Silas Ravi MD 2021 11:11 AM               History & Physical      Yanely Singh MD at 06/10/21 1453          Patient Name: Chelsey Herrera  MRN: [id number]  : 1950  DOS: 6/10/2021    Attending: Silas Ravi MD    Primary Care Provider: Everardo Mccormack  MD      Chief complaint: Right hip pain    Subjective   Patient is a pleasant 70 y.o. female presented for scheduled surgery by Dr. Ravi.  Patient underwent right total hip arthroplasty under spinal anesthesia.  Surgery went well and she will be admitted for further medical management.  Patient reports chronic pain in her back and bilateral hips for which she has controlled with Norco.  She is on chronic anticoagulation with Eliquis.    When seen postop patient is doing well, pain is tolerable.  No nausea, vomiting, shortness of breath, or chest pain.    Allergies:  Allergies   Allergen Reactions   • Accupril [Quinapril Hcl] Cough   • Cephalexin Itching   • Ciprofloxacin Itching   • Triamcinolone Other (See Comments)     Burning sensation       Meds:  Medications Prior to Admission   Medication Sig Dispense Refill Last Dose   • flecainide (TAMBOCOR) 50 MG tablet TAKE 1 TABLET BY MOUTH EVERY 12 (TWELVE) HOURS. 180 tablet 3 6/10/2021 at 0600   • HYDROcodone-acetaminophen (NORCO) 7.5-325 MG per tablet Take 1 tablet by mouth Every 6 (Six) Hours As Needed for moderate pain (4-6).   6/9/2021 at 2100   • levothyroxine (SYNTHROID, LEVOTHROID) 50 MCG tablet Take 50 mcg by mouth Daily.   6/9/2021 at AM   • metoprolol tartrate (LOPRESSOR) 50 MG tablet TAKE 1 TABLET BY MOUTH TWICE A  tablet 0 6/10/2021 at 0600   • apixaban (ELIQUIS) 5 MG tablet tablet Take 5 mg by mouth 2 (Two) Times a Day.   6/7/2021 at 0900   • cyclobenzaprine (FLEXERIL) 10 MG tablet Take 10 mg by mouth At Night As Needed for muscle spasms.   6/8/2021   • diclofenac (VOLTAREN) 1 % gel gel Apply 4 g topically to the appropriate area as directed 4 (Four) Times a Day.   6/1/2021   • furosemide (LASIX) 40 MG tablet Take 40 mg by mouth Daily As Needed (swelling).   More than a month at Unknown time   • potassium chloride (K-DUR,KLOR-CON) 20 MEQ CR tablet Take 20 mEq by mouth As Needed (with lasix).   More than a month at Unknown time         History:   Past  Medical History:   Diagnosis Date   • Arthritis    • Back pain    • Bilateral kidney stones    • Chronic fatigue    • Chronic pain    • Dental bridge present    • Fibromyalgia    • Hypertension    • Hypothyroidism    • Insomnia    • Muscle weakness (generalized)    • Palpitations    • Persistent atrial fibrillation (CMS/HCC) 1/23/2017    A) Diagnosed 2016 by PCP when presenting with extreme fatigue B) CHADS-VASc = 3 (age, female, HTN)  C) 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 Changed to flecainide. F) ECV 11/29/16 on flecainide. Recurrence of afib about 24hrs later. F) TTE 10/20/16 bin   • PONV (postoperative nausea and vomiting)    • Sciatica of right side    • SOB (shortness of breath)    • Urination frequency    • Wears eyeglasses     reading     Past Surgical History:   Procedure Laterality Date   • CARDIAC CATHETERIZATION      NO STENTS   • CARDIAC ELECTROPHYSIOLOGY PROCEDURE N/A 2/22/2017    Procedure: PVA. DNS meds.;  Surgeon: Miguelito Owusu DO;  Location: [facility] INVASIVE LOCATION;  Service:    • CARDIOVERSION      X 2   • CARPAL TUNNEL RELEASE Bilateral    • COLONOSCOPY      2013   • GALLBLADDER SURGERY     • GASTRIC BYPASS     • REPLACEMENT TOTAL KNEE BILATERAL Bilateral    • SINUS SURGERY      X 2   • UTERINE SUSPENSION LAPAROSCOPIC       Family History   Problem Relation Age of Onset   • Heart attack Mother    • Heart attack Father    • Heart disease Brother    • Diabetes Brother    • Kidney disease Brother      Social History     Tobacco Use   • Smoking status: Never Smoker   • Smokeless tobacco: Never Used   Vaping Use   • Vaping Use: Never used   Substance Use Topics   • Alcohol use: No   • Drug use: No   She is  and lives with .  Works for [employer].    Review of Systems  All systems were reviewed and negative except for:  Musculoskeletal:  "positive for  back pain and joint pain    Vital Signs  BP (!) 128/101 (BP Location: Right arm, Patient Position: Lying)   Pulse 63   Temp 97.3 °F (36.3 °C) (Oral)   Resp 18   Ht 162.6 cm (64\")   Wt 98.5 kg (217 lb 2.5 oz)   LMP  (LMP Unknown)   SpO2 100%   BMI 37.27 kg/m²     Physical Exam:    General Appearance:    Alert, cooperative, in no acute distress   Head:    Normocephalic, without obvious abnormality, atraumatic   Eyes:            Lids and lashes normal, conjunctivae and sclerae normal, no   icterus, no pallor, corneas clear    Ears:    Ears appear intact with no abnormalities noted   Throat:   No oral lesions, no thrush, oral mucosa moist   Neck:   No adenopathy, supple, trachea midline, no thyromegaly         Lungs:     Clear to auscultation,respirations regular, even and   unlabored. No wheezes or rales.    Heart:    Regular rhythm and normal rate, normal S1 and S2, no murmur, no gallop   Abdomen:     Normal bowel sounds, no masses, no organomegaly, soft        non-tender, non-distended, no guarding, no rebound                 tenderness.  Obese.   Genitalia:    Deferred   Extremities:  RLE, CDI dressing right hip.  Bilateral leg numbness.   Pulses:   Pulses palpable and equal bilaterally   Skin:   No bleeding, bruising or rash   Neurologic:   Cranial nerves 2 - 12 grossly intact.      I reviewed the patient's new clinical results.             Invalid input(s): NEUTOPHILPCT,  EOSPCT  Results from last 7 days   Lab Units 06/10/21  0805   POTASSIUM mmol/L 3.7     Lab Results   Component Value Date    HGBA1C 6.40 (H) 05/27/2021   Results for ANIBAL MORAN (MRN [id number]) as of 6/10/2021 15:15   Ref. Range 5/27/2021 13:06   Glucose Latest Ref Range: 65 - 99 mg/dL 107 (H)   Sodium Latest Ref Range: 136 - 145 mmol/L 137   Potassium Latest Ref Range: 3.5 - 5.2 mmol/L 4.2   CO2 Latest Ref Range: 22.0 - 29.0 mmol/L 25.0   Chloride Latest Ref Range: 98 - 107 mmol/L 100   Anion Gap Latest Ref Range: " 5.0 - 15.0 mmol/L 12.0   Creatinine Latest Ref Range: 0.57 - 1.00 mg/dL 0.72   BUN Latest Ref Range: 8 - 23 mg/dL 11   BUN/Creatinine Ratio Latest Ref Range: 7.0 - 25.0  15.3   Calcium Latest Ref Range: 8.6 - 10.5 mg/dL 9.4   eGFR Non  Am Latest Ref Range: >60 mL/min/1.73 80   Hemoglobin A1C Latest Ref Range: 4.80 - 5.60 % 6.40 (H)   C-Reactive Protein Latest Ref Range: 0.00 - 0.50 mg/dL 0.89 (H)   Protime Latest Ref Range: 11.4 - 14.4 Seconds 15.1 (H)   INR Latest Ref Range: 0.85 - 1.16  1.23 (H)   PTT Latest Ref Range: 22.0 - 39.0 seconds 32.7   WBC Latest Ref Range: 3.40 - 10.80 10*3/mm3 9.25   RBC Latest Ref Range: 3.77 - 5.28 10*6/mm3 4.05   Hemoglobin Latest Ref Range: 12.0 - 15.9 g/dL 10.4 (L)   Hematocrit Latest Ref Range: 34.0 - 46.6 % 33.8 (L)   RDW Latest Ref Range: 12.3 - 15.4 % 15.5 (H)   MCV Latest Ref Range: 79.0 - 97.0 fL 83.5   MCH Latest Ref Range: 26.6 - 33.0 pg 25.7 (L)   MCHC Latest Ref Range: 31.5 - 35.7 g/dL 30.8 (L)   MPV Latest Ref Range: 6.0 - 12.0 fL 10.3   Platelets Latest Ref Range: 140 - 450 10*3/mm3 271   RDW-SD Latest Ref Range: 37.0 - 54.0 fl 47.2           Assessment and Plan:       Status post total replacement of right hip    Hypertension    Obesity, Class II, BMI 35-39.9    Fibromyalgia    Primary osteoarthritis of right hip    Chronic anticoagulation    Elevated hemoglobin A1c      Plan:    1. PT/OT, Weight bearing as tolerated RLE.  2. Pain control-prns  3. IS-encourage  4. DVT proph- Mechanicals and Eliquis  5. Bowel regimen  6. Resume home medications as appropriate  7. Monitor post-op labs  8. DC planning for home, likely tomorrow    - Hypertension:  Resume home medications as appropriate, formulary substitution when indicated.  Holding parameters.  Prn medications for elevated blood pressure.    -Chronic anticoagulation  Continue eliquis at half of home dose    -Elevated A1c  Explained to patient implication of A1C elevation, need to modify diet and increase  activity, and f/u with PCP.          Dragon disclaimer:  Part of this encounter note is an electronic transcription/translation of spoken language to printed text. The electronic translation of spoken language may permit erroneous, or at times, nonsensical words or phrases to be inadvertently transcribed; Although I have reviewed the note for such errors, some may still exist.    Yanely Singh MD  06/10/21  15:16 EDT                Electronically signed by Yanley Singh MD at 06/10/21 1521     Silas Ravi MD at 06/10/21 0807            Pre-Op H&P  Chelsey Herrera  [id number]  1950      Chief complaint: Right hip pain      Subjective:  Patient is a 70 y.o.female presents for scheduled surgery by Dr. Ravi. She anticipates a TOTAL HIP ARTHROPLASTY ANTERIOR MODIFIED today. Her hip has been painful for over a year. She denies use of assistive device for ambulation or recent falls.   She has hx of afib s/p ablation. She is followed by Dr. Sinclair, cardiology, and had preop cardiac clearance. Last dose of Eliquis 6/7/21.      Review of Systems:  Constitutional-- No fever, chills or sweats. No fatigue.  CV-- No chest pain, palpitation or syncope. +HTN, afib  Resp-- No SOB, cough, hemoptysis  Skin--No rashes or lesions      Allergies:   Allergies   Allergen Reactions   • Accupril [Quinapril Hcl] Cough   • Cephalexin Itching   • Ciprofloxacin Itching   • Triamcinolone Other (See Comments)     Burning sensation         Home Meds:  Medications Prior to Admission   Medication Sig Dispense Refill Last Dose   • apixaban (ELIQUIS) 5 MG tablet tablet Take 5 mg by mouth 2 (Two) Times a Day.      • Chlorhexidine Gluconate 4 % solution Shower with hibiclens solution as directed for 5 days prior to surgery 237 mL 0    • cyclobenzaprine (FLEXERIL) 10 MG tablet Take 10 mg by mouth At Night As Needed for muscle spasms.      • diclofenac (VOLTAREN) 1 % gel gel Apply 4 g topically to the appropriate area as directed  4 (Four) Times a Day.      • flecainide (TAMBOCOR) 50 MG tablet TAKE 1 TABLET BY MOUTH EVERY 12 (TWELVE) HOURS. 180 tablet 3    • furosemide (LASIX) 40 MG tablet Take 40 mg by mouth Daily As Needed (swelling).      • HYDROcodone-acetaminophen (NORCO) 7.5-325 MG per tablet Take 1 tablet by mouth Every 6 (Six) Hours As Needed for moderate pain (4-6).      • levothyroxine (SYNTHROID, LEVOTHROID) 50 MCG tablet Take 50 mcg by mouth Daily.      • metoprolol tartrate (LOPRESSOR) 50 MG tablet TAKE 1 TABLET BY MOUTH TWICE A DAY (Patient taking differently: Take 50 mg by mouth 2 (Two) Times a Day.) 180 tablet 0    • mupirocin (BACTROBAN) 2 % ointment Apply into the nostril(s) as directed by provider 2 (Two) Times a Day. 22 g 0    • potassium chloride (K-DUR,KLOR-CON) 20 MEQ CR tablet Take 20 mEq by mouth As Needed (with lasix).            PMH:   Past Medical History:   Diagnosis Date   • Arthritis    • Back pain    • Bilateral kidney stones    • Chronic fatigue    • Chronic pain    • Dental bridge present    • Fibromyalgia    • Hypertension    • Hypothyroidism    • Insomnia    • Muscle weakness (generalized)    • Palpitations    • Persistent atrial fibrillation (CMS/HCC) 1/23/2017    A) Diagnosed 2016 by PCP when presenting with extreme fatigue B) CHADS-VASc = 3 (age, female, HTN)  C) 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 Changed to flecainide. F) ECV 11/29/16 on flecainide. Recurrence of afib about 24hrs later. F) TTE 10/20/16 bin   • PONV (postoperative nausea and vomiting)    • Sciatica of right side    • SOB (shortness of breath)    • Urination frequency    • Wears eyeglasses     reading     PSH:    Past Surgical History:   Procedure Laterality Date   • CARDIAC CATHETERIZATION      NO STENTS   • CARDIAC ELECTROPHYSIOLOGY PROCEDURE N/A 2/22/2017    Procedure: PVA. DNS meds.;   "Surgeon: Miguelito Owusu DO;  Location: [facility] INVASIVE LOCATION;  Service:    • CARDIOVERSION      X 2   • CARPAL TUNNEL RELEASE Bilateral    • COLONOSCOPY      2013   • GALLBLADDER SURGERY     • GASTRIC BYPASS     • REPLACEMENT TOTAL KNEE BILATERAL Bilateral    • SINUS SURGERY      X 2   • UTERINE SUSPENSION LAPAROSCOPIC         Immunization History:  Influenza: 2020  Pneumococcal: UTD  Tetanus: No  Covid x2: 2021    Social History:   Tobacco:   Social History     Tobacco Use   Smoking Status Never Smoker   Smokeless Tobacco Never Used      Alcohol:     Social History     Substance and Sexual Activity   Alcohol Use No         Physical Exam:BP (!) 193/92 (BP Location: Right arm, Patient Position: Lying)   Pulse 58   Temp 97 °F (36.1 °C) (Temporal)   Resp 16   Ht 162.6 cm (64\")   Wt 98.5 kg (217 lb 2.5 oz)   LMP  (LMP Unknown)   SpO2 97%   BMI 37.27 kg/m²       General Appearance:    Alert, cooperative, no distress, appears stated age   Head:    Normocephalic, without obvious abnormality, atraumatic   Lungs:     Clear to auscultation bilaterally, respirations unlabored    Heart:   Regular rate and rhythm, S1 and S2 normal    Abdomen:    Soft without tenderness   Extremities:   Extremities normal, atraumatic, no cyanosis or edema   Skin:   Skin color, texture, turgor normal, no rashes or lesions   Neurologic:   Grossly intact     Results Review:     LABS:  Lab Results   Component Value Date    WBC 9.25 05/27/2021    HGB 10.4 (L) 05/27/2021    HCT 33.8 (L) 05/27/2021    MCV 83.5 05/27/2021     05/27/2021    NEUTROABS 6.21 05/27/2021    GLUCOSE 107 (H) 05/27/2021    BUN 11 05/27/2021    CREATININE 0.72 05/27/2021    EGFRIFNONA 80 05/27/2021     05/27/2021    K 4.2 05/27/2021     05/27/2021    CO2 25.0 05/27/2021    MG 2.2 02/21/2017    CALCIUM 9.4 05/27/2021    ALBUMIN 4.60 02/21/2017    AST 22 02/21/2017    ALT 13 02/21/2017    BILITOT 0.9 02/21/2017       RADIOLOGY:  Imaging Results " (Last 72 Hours)       ** No results found for the last 72 hours. **            I reviewed the patient's new clinical results.    Cancer Staging (if applicable)  Cancer Patient: __ yes __no __unknown; If yes, clinical stage T:__ N:__M:__, stage group or __N/A      Impression: Primary osteoarthritis right hip      Plan: TOTAL HIP ARTHROPLASTY ANTERIOR MODIFIED      Maine Valadez, APRN   6/10/2021   08:14 EDT    Agree with above.  Plan for right total hip arthroplasty.    Silas Ravi MD  06/10/21  09:55 EDT      Electronically signed by Silas Ravi MD at 06/10/21 0920          Operative/Procedure Notes (most recent note)      Silas Ravi MD at 06/10/21 1106          DATE OF PROCEDURE:  06/10/21    PREOPERATIVE DIAGNOSIS: right hip arthritis    POSTOPERATIVE DIAGNOSIS: right hip arthritis    PROCEDURE PERFORMED: right total hip arthroplasty with [employer]hew components, anterior modified Santoro-Velazco approach    IMPLANTS: #50 press-fit R3 cup, 25 screw,  neutral polyethylene liner,  #2 standard offset press-fit Polarstem, 32 x +0 Bio-lox ceramic head    SURGEON: Silas Ravi MD    ASSISTANT: Cherrie White PA-C  (Cherrie White PA-C was present and necessary for positioning, draping, retraction, instrumentation and closure.)    SPECIMENS: None    IMPLANTS:   Implants     Implant    Bilateral Knee Hardware - Implanted      As of 5/27/2021     Status: Implanted                         ANESTHESIA:  Spinal    STAFF:  Circulator: Kayla Figueroa RN; Maria Victoria Juarez RN  Radiology Technologist: Dustin Mejía RT; Aly Lang  Scrvalentina Person: Lynne Catalan; Buster Bucio  Vendor Representative: Audie Anne  Nursing Assistant: Leora Stallings  Assistant: Cherrie White PA-C    ESTIMATED BLOOD LOSS: 250 mL     COMPLICATIONS: None    PREOPERATIVE ANTIBIOTICS: Clindamycin 900 mg    INDICATIONS: The patient is a 70 y.o. female with a history of debilitating right hip pain  secondary to osteoarthritis, that failed to improve in spite of conservative treatment. The patient opted for a right total hip arthroplasty at this time and consented for the procedure. Please see my office notes for details with regard to preoperative counseling and operative rationale.     DESCRIPTION OF PROCEDURE: The patient was positively identified in the preoperative holding area, brought to the operative suite, and placed in a supine position. After adequate spinal anesthetic had been achieved, the patient was placed in the supine position with a well padded folded blanket bolster under the right flank area, leaving the buttock free. After sterile prep and drape of the right hip and lower extremity, as well as draping the non-operative extremity to allow access for limb length assessment, a timeout procedure was performed to confirm the operative site, as well as the other parameters.     After placing a bump under the knee, a skin incision was made over the lateral border of the tensor fascia latae from the level of the anterior superior iliac spine distally on the anterior aspect of the hip for a modified Santoro-Velazco approach. Following a sharp skin incision, dissection was carried down to the level of the fascia, ensuring clear identification of the interval between the tensor and the fascia laterally.  Fascia was then incised from proximal to distal, leaving a good cuff of tissue for later repair, then working form distal to proximal perforating vessels were identified and cauterized, including the perforating vessels along the anterior edge of the gluteus medius.  With the anterior edge of the gluteus medius identified, a Cobra retractor was placed on the capsule over the superior aspect of the femoral neck.  After identifying the superior edge of the vastus lateralis distally, a second Cobra retractor was placed over the capsule overlying the inferior femoral neck.  The reflected head of the rectus  "femoris was identified, and the fascia released enough to allow placement of a single prong acetabular retractor. The knee bump was then removed, leg externally rotated, and anterior capsule excised first laterally then medially, and the labrum incised superiorly.  Cobra retractors were repositioned on the exposed femoral neck both superiorly and inferiorly.  Description of femoral head: Complete eburnation, with osteophytes at the head neck junction.  A neck cut was then made at the head and neck junction with the aid of an oscillating saw blade, followed by a second cut at the base of the femoral neck.  The \"napkin ring\" femoral neck fragment was removed, as well as the femoral head after repositioning the posterior Cobra retractor just outside the hip capsule.    Acetabular exposure was then addressed by repositioning the anterior Cobra retractor between the capsule and the psoas tendon.  The labrum was then removed from the rim of the acetabulum anteriorly, superiorly and posteriorly, preserving the transverse acetabular ligament. The anterior Cobra retractor was replaced over the transverse acetabular ligament, and a Alfonso retractor placed over the posterior wall of the acetabulum.  Description of acetabulum: Complete wear, with anterior acetabular osteophytes. With the transverse acetabular ligament as a reference for cup positioning, the acetabulum was sequentially reamed up to 50 to accommodate a 50 press-fit R3 cup, which had excellent press-fit characteristics.  A single 25 mm screw was placed, which had excellent purchase, followed by a neutral polyethylene liner.    Attention was then redirected towards the femoral aspect. The non-operative limb was then placed on a padded Morales stand, to allow for appropriate positioning of the operative limb.  Using the bone hook for traction in the calcar region of the proximal femur, the posterior capsule was released adjacent to the greater trochanter to allow for " delivery of the proximal femur with a double fang retractor.  With appropriate external rotation of the proximal femur and further adduction of the leg following double fang retractor placement and removal of the single-toothed fang anteriorly, a Alfonso retractor was placed in the region of the calcar and femoral preparations were made to accommodate the polar stem.  Box osteotome was used to prepare the lateral aspect, followed by the T-handle canal finder, then sequential broaching of the femur to accommodate a #2 broach, standard offset neck, with a +0 x 32 head.      Trial reduction was performed, full arc of motion noted, with appropriate limb lengths after leveling the table.  Intraoperative fluoroscopy showed appropriate implant alignment and leg lengths.  The hip was again dislocated and the final #2 standard offset press-fit Polar stem placed, followed by +0 x 32 ceramic head, with the same reduction characteristics were noted as with the trial with no dislocation throughout the full arc of motion and appropriate limb lengths.      Therefore, the hip was copiously irrigated and attention directed towards closure. Fascia latae was closed with #1 Vicryl in an interrupted figure-of-eight fashion in 3 strategic locations both proximally, distally and in the central portion, followed by oversewing this from distal to proximal with a #1 StrataFix symmetric, which nicely sealed that layer, followed by closure of the subcutaneous layer with 2-0 Vicryl and the skin with 3-0 StrataFix in a running subcuticular fashion.  Adhesive wound closure dressing was applied followed by a sterile dressing with 4 x 4s secured with micropore tape.  The patient tolerated the procedure well and was brought to the recovery room in good condition.     POSTOPERATIVE PLAN:  1. The patient will begin early range of motion and weight-bearing per the post anterior total hip arthroplasty protocol.   2. I anticipate brief hospitalization  "for initial rehabilitation and pain control followed by continued rehabilitation in a home health or outpatient physical therapy setting.  Patient likely ready for discharge tomorrow as long as her pain is controlled, and cleared both medically and by physical therapy.   3. Postoperative medical management with Dr. Singh.  4. Postoperative DVT prophylaxis with aspirin.  5. Postoperative IV antibiotics with clindamycin for 24 hours.      Silas Ravi MD  06/10/21  12:38 EDT    Electronically signed by Silas Ravi MD at 06/10/21 1238          Physician Progress Notes (most recent note)      Silas Ravi MD at 21 1050                [facility] Progress Note    Subjective      LOS: 1 day   Patient Care Team:  Everardo Mccormack MD as PCP - General (Adolescent Medicine)  Jeremiah Sinclair MD as Consulting Physician (Cardiology)    CC: Right hip pain    Interval History:   Patient resting comfortably in a chair when I saw her earlier this morning.  Pain well controlled.  She would like to go home today.    Objective      Vital Signs  Temp (24hrs), Av.7 °F (36.5 °C), Min:97.2 °F (36.2 °C), Max:98.6 °F (37 °C)      /60 (BP Location: Right arm, Patient Position: Lying)   Pulse 76   Temp 98.4 °F (36.9 °C) (Oral)   Resp 20   Ht 162.6 cm (64\")   Wt 98.5 kg (217 lb 2.5 oz)   LMP  (LMP Unknown)   SpO2 99%   BMI 37.27 kg/m²     Examination:   Examination of the right hip: The wound is clean, dry, and intact.  Knee flexion, knee extension, ankle dorsiflexion, ankle plantar flexion, and EHL are intact.  Sensation intact in the foot to light touch.   Thigh is soft and nontender.      Labs:  Results from last 7 days   Lab Units 21  0349   WBC 10*3/mm3 12.71*   HEMOGLOBIN g/dL 8.2*   HEMATOCRIT % 26.8*   MCV fL 84.0   PLATELETS 10*3/mm3 292       Radiology:  Imaging Results (Last 24 Hours)     ** No results found for the last 24 hours. **          PT:  Physical Therapy " - Plan of Care Review - Outcome Summary:  Outcome Summary: Pt. ambulated 350' stand by assist, 4 steps w/minimal assistance and cueing for technique. Pt. sit to stand transfer stand by assist. HEP prescribed.  present for observation and education for home safety recommendations with functional mobility. Recommend DC home with family assist. (21 1023)]       Results Review:     I reviewed the patient's new clinical results.    Assessment and Plan     Assessment:   Status post right total hip arthroplasty-doing well      Status post total replacement of right hip    Hypertension    Obesity, Class II, BMI 35-39.9    Fibromyalgia    Primary osteoarthritis of right hip    Chronic anticoagulation    Elevated hemoglobin A1c      Plan for disposition: Plan for discharge home today as long as she is cleared medically and by physical therapy.  Follow-up with me in 3 weeks as planned.      Future Appointments   Date Time Provider Department Center   2021  3:00 PM Silas Ravi MD [facility]           Silas Ravi MD  21  10:51 EDT      Electronically signed by Silas Ravi MD at 21 1051          Physical Therapy Notes (most recent note)      Neva Muñoz, PT at 21 0900  Version 1 of 1         Patient Name: Chelsey Herrera  : 1950    MRN: [id number]                              Today's Date: 2021       Admit Date: 6/10/2021    Visit Dx:     ICD-10-CM ICD-9-CM   1. Status post total replacement of right hip  Z96.641 V43.64   2. Primary osteoarthritis of right hip  M16.11 715.15     Patient Active Problem List   Diagnosis   • Persistent atrial fibrillation (CMS/HCC)   • Hypertension   • Obesity, Class II, BMI 35-39.9   • Fibromyalgia   • Nephrolithiasis   • History of surgery   • Primary osteoarthritis of right hip   • Status post total replacement of right hip   • Chronic anticoagulation   • Elevated hemoglobin A1c     Past Medical History:   Diagnosis Date   •  Arthritis    • Back pain    • Bilateral kidney stones    • Chronic fatigue    • Chronic pain    • Dental bridge present    • Fibromyalgia    • Hypertension    • Hypothyroidism    • Insomnia    • Muscle weakness (generalized)    • Palpitations    • Persistent atrial fibrillation (CMS/HCC) 1/23/2017    A) Diagnosed 2016 by PCP when presenting with extreme fatigue B) CHADS-VASc = 3 (age, female, HTN)  C) 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 Changed to flecainide. F) ECV 11/29/16 on flecainide. Recurrence of afib about 24hrs later. F) TTE 10/20/16 bin   • PONV (postoperative nausea and vomiting)    • Sciatica of right side    • SOB (shortness of breath)    • Urination frequency    • Wears eyeglasses     reading     Past Surgical History:   Procedure Laterality Date   • CARDIAC CATHETERIZATION      NO STENTS   • CARDIAC ELECTROPHYSIOLOGY PROCEDURE N/A 2/22/2017    Procedure: PVA. DNS meds.;  Surgeon: Miguelito Owusu DO;  Location: [facility] INVASIVE LOCATION;  Service:    • CARDIOVERSION      X 2   • CARPAL TUNNEL RELEASE Bilateral    • COLONOSCOPY      2013   • GALLBLADDER SURGERY     • GASTRIC BYPASS     • REPLACEMENT TOTAL KNEE BILATERAL Bilateral    • SINUS SURGERY      X 2   • TOTAL HIP ARTHROPLASTY Right 6/10/2021    Procedure: TOTAL HIP ARTHROPLASTY ANTERIOR MODIFIED RIGHT;  Surgeon: Silas Ravi MD;  Location: [facility] OR;  Service: Orthopedics;  Laterality: Right;   • UTERINE SUSPENSION LAPAROSCOPIC       General Information     Row Name 06/11/21 1004          Physical Therapy Time and Intention    Document Type  therapy note (daily note);discharge treatment  -SS     Mode of Treatment  physical therapy  -SS     Row Name 06/11/21 1004          General Information    Patient Profile Reviewed  yes  -SS     Existing Precautions/Restrictions  fall;right;hip,  anterior;other (see comments) modified anterior approach  -     Barriers to Rehab  none identified  -     Row Name 06/11/21 1004          Home Main Entrance    Number of Stairs, Main Entrance  four  -SS     Stair Railings, Main Entrance  railing on left side (ascending)  -     Row Name 06/11/21 1004          Cognition    Orientation Status (Cognition)  oriented x 4  -SS     Row Name 06/11/21 1004          Safety Issues, Functional Mobility    Safety Issues Affecting Function (Mobility)  ability to follow commands  -     Impairments Affecting Function (Mobility)  endurance/activity tolerance;strength;pain;range of motion (ROM);balance  -       User Key  (r) = Recorded By, (t) = Taken By, (c) = Cosigned By    Initials Name Provider Type     Neva Muñoz, PT Physical Therapist        Mobility     Row Name 06/11/21 1011          Bed Mobility    Scooting/Bridging [place] (Bed Mobility)  not tested  -     Supine-Sit [place] (Bed Mobility)  not tested  -     Comment (Bed Mobility)  Pt. received up in chair. Pt. denies difficulty w/bed mobility  -     Row Name 06/11/21 1011          Transfers    Comment (Transfers)  Cues for hand placement  -     Row Name 06/11/21 1011          Sit-Stand Transfer    Sit-Stand [place] (Transfers)  modified independence  -     Assistive Device (Sit-Stand Transfers)  walker, front-wheeled  -     Row Name 06/11/21 1011          Gait/Stairs (Locomotion)    [place] Level (Gait)  verbal cues;standby assist;1 person assist  -     Assistive Device (Gait)  walker, front-wheeled  -     Distance in Feet (Gait)  350  -     Deviations/Abnormal Patterns (Gait)  -- cues for decreasing gait speed, slightly impulsive  -SS     Bilateral Gait Deviations  forward flexed posture Cues for upright posture, looking ahead  -     [place] Level (Stairs)  minimum assist (75% patient effort);1 person assist  -     Assistive Device (Stairs)  cane, straight hand  rail  -     Handrail Location (Stairs)  left side (ascending);right side (descending)  -     Number of Steps (Stairs)  4  -SS     Ascending Technique (Stairs)  step-to-step  -SS     Descending Technique (Stairs)  step-to-step  -SS     Comment (Gait/Stairs)  Pt. ambulated with step through pattern with improved speed from previous sessions, slightly impulsive. Verbal cues for upright posture, looking ahead and controlling movement. Pt. performed 4 steps ascending/descending w/one hand rail and cane, requiring min cueing for technique and good carryover.  present during stair training and educated for assistance and body positioning.  -     Row Name 06/11/21 1011          Mobility    Extremity Weight-bearing Status  right lower extremity  -     Right Lower Extremity (Weight-bearing Status)  weight-bearing as tolerated (WBAT)  -       User Key  (r) = Recorded By, (t) = Taken By, (c) = Cosigned By    Initials Name Provider Type     Neva Muoñz PT Physical Therapist        Obj/Interventions     Row Name 06/11/21 1020          Motor Skills    Motor Skills  therapeutic exercise  -     Therapeutic Exercise  hip;knee;ankle  -     Row Name 06/11/21 1020          Hip (Therapeutic Exercise)    Hip (Therapeutic Exercise)  strengthening exercise  -     Hip Isometrics (Therapeutic Exercise)  bilateral;gluteal sets;3 second hold;10 repetitions  -     Hip Strengthening (Therapeutic Exercise)  right;aBduction;aDduction;sitting;flexion;standing;10 repetitions;other (see comments) heel raises and mini squats in standing, 10 repetitions  -     Row Name 06/11/21 1020          Knee (Therapeutic Exercise)    Knee (Therapeutic Exercise)  strengthening exercise  -     Knee Isometrics (Therapeutic Exercise)  bilateral;quad sets;sitting;10 repetitions  -     Knee Strengthening (Therapeutic Exercise)  right;heel slides;LAQ (long arc quad);sitting;10 repetitions  -     Row Name 06/11/21 1020          Ankle  (Therapeutic Exercise)    Ankle (Therapeutic Exercise)  strengthening exercise  -SS     Ankle AROM (Therapeutic Exercise)  bilateral;dorsiflexion;plantarflexion;10 repetitions;sitting  -SS     Row Name 06/11/21 1020          Balance    Balance Assessment  standing static balance  -SS     Static Standing Balance  WNL;supported  -SS     Dynamic Standing Balance  WNL;supported  -SS     Comment, Balance  standing balance WNL w/UE support on walker  -SS       User Key  (r) = Recorded By, (t) = Taken By, (c) = Cosigned By    Initials Name Provider Type    SS Neva Muñoz, PT Physical Therapist        Goals/Plan     Row Name 06/11/21 1032          Bed Mobility Goal 1 (PT)    Activity/Assistive Device (Bed Mobility Goal 1, PT)  sit to supine/supine to sit  -SS     [place] Level/Cues Needed (Bed Mobility Goal 1, PT)  modified independence  -SS     Time Frame (Bed Mobility Goal 1, PT)  long term goal (LTG);3 days  -SS     Progress/Outcomes (Bed Mobility Goal 1, PT)  goal met  -     Row Name 06/11/21 1032          Transfer Goal 1 (PT)    Activity/Assistive Device (Transfer Goal 1, PT)  sit-to-stand/stand-to-sit;walker, rolling  -SS     [place] Level/Cues Needed (Transfer Goal 1, PT)  contact guard assist  -SS     Time Frame (Transfer Goal 1, PT)  long term goal (LTG);3 days  -SS     Progress/Outcome (Transfer Goal 1, PT)  goal met  -     Row Name 06/11/21 1032          Gait Training Goal 1 (PT)    Activity/Assistive Device (Gait Training Goal 1, PT)  gait (walking locomotion);walker, rolling  -SS     [place] Level (Gait Training Goal 1, PT)  modified independence  -SS     Distance (Gait Training Goal 1, PT)  150 feet  -SS     Time Frame (Gait Training Goal 1, PT)  long term goal (LTG);3 days  -SS     Progress/Outcome (Gait Training Goal 1, PT)  goal met  -     Row Name 06/11/21 1032          Stairs Goal 1 (PT)    Activity/Assistive Device (Stairs Goal 1, PT)  stairs, all skills;using handrail, left;cane,  straight  -     [place] Level/Cues Needed (Stairs Goal 1, PT)  contact guard assist  -SS     Number of Stairs (Stairs Goal 1, PT)  4  -SS     Time Frame (Stairs Goal 1, PT)  long term goal (LTG);3 days  -     Progress/Outcome (Stairs Goal 1, PT)  goal partially met  -       User Key  (r) = Recorded By, (t) = Taken By, (c) = Cosigned By    Initials Name Provider Type     Neva Muñoz, PT Physical Therapist        Clinical Impression     Row Name 06/11/21 1023          Pain    Additional Documentation  Pain Scale: Numbers Pre/Post-Treatment (Group)  -     Row Name 06/11/21 1023          Pain Scale: Numbers Pre/Post-Treatment    Pretreatment Pain Rating  3/10  -SS     Posttreatment Pain Rating  4/10  -SS     Pain Location - Side  Right  -     Pain Location - Orientation  generalized  -     Pain Location  hip  -     Pre/Posttreatment Pain Comment  Pt. requesting pain meds post PT session  -     Pain Intervention(s)  Cold applied  -     Row Name 06/11/21 1023          Plan of Care Review    Plan of Care Reviewed With  patient;spouse  -     Progress  improving  -     Outcome Summary  Pt. ambulated 350' stand by assist, 4 steps w/minimal assistance and cueing for technique. Pt. sit to stand transfer stand by assist. HEP prescribed.  present for observation and education for home safety recommendations with functional mobility. Recommend DC home with family assist.  -     Row Name 06/11/21 1023          Therapy Assessment/Plan (PT)    Patient/Family Therapy Goals Statement (PT)  To return home  -     Criteria for Skilled Interventions Met (PT)  yes;meets criteria;skilled treatment is necessary  -     Row Name 06/11/21 1023          Positioning and Restraints    Pre-Treatment Position  sitting in chair/recliner  -     Post Treatment Position  chair  -SS     In Chair  notified nsg;reclined;sitting;call light within reach;encouraged to call for assist;exit alarm on;with  family/caregiver  -       User Key  (r) = Recorded By, (t) = Taken By, (c) = Cosigned By    Initials Name Provider Type    Neva Bell, PT Physical Therapist        Outcome Measures     Row Name 06/11/21 1033          How much help from another person do you currently need...    Turning from your back to your side while in flat bed without using bedrails?  4  -SS     Moving from lying on back to sitting on the side of a flat bed without bedrails?  4  -SS     Moving to and from a bed to a chair (including a wheelchair)?  3  -SS     Standing up from a chair using your arms (e.g., wheelchair, bedside chair)?  4  -SS     Climbing 3-5 steps with a railing?  3  -SS     To walk in hospital room?  3  -SS     AM-PAC 6 Clicks Score (PT)  21  -SS     Row Name 06/11/21 1033 06/11/21 0909       Functional Assessment    Outcome Measure Options  AM-PAC 6 Clicks Basic Mobility (PT)  -SS  AM-PAC 6 Clicks Daily Activity (OT)  -AR      User Key  (r) = Recorded By, (t) = Taken By, (c) = Cosigned By    Initials Name Provider Type    Maine Ibarra, OT Occupational Therapist    SS Neva Muñoz, SANDEEP Physical Therapist        Physical Therapy Education                 Title: PT OT SLP Therapies (Done)     Topic: Physical Therapy (Done)     Point: Mobility training (Done)     Learning Progress Summary           Patient MURALI Cervantes H, VU, DU by  at 6/11/2021 1034    Comment: Educated HEP w/handout, reviewed hip precautions, family education for safety w/stair training    Acceptance, ISHA CARRION VU by  at 6/10/2021 1535    Comment: Educated on safe sequencing with bed mobility, ambulatory/toilet transfers, and gait. Reviewed HEP and hip precautions.   Significant Other MURALI Cervantes H, VU, DU by  at 6/11/2021 1034    Comment: Educated HEP w/handout, reviewed hip precautions, family education for safety w/stair training                   Point: Home exercise program (Done)     Learning Progress Summary           Patient MURALI Cervantes H, VU, DU  by  at 6/11/2021 1034    Comment: Educated HEP w/handout, reviewed hip precautions, family education for safety w/stair training    Acceptance, E,D, VU by PALMIRA at 6/10/2021 1535    Comment: Educated on safe sequencing with bed mobility, ambulatory/toilet transfers, and gait. Reviewed HEP and hip precautions.   Significant Other Eager, E,H, VU,DU by  at 6/11/2021 1034    Comment: Educated HEP w/handout, reviewed hip precautions, family education for safety w/stair training                   Point: Body mechanics (Done)     Learning Progress Summary           Patient Eager, E,H, VU,DU by  at 6/11/2021 1034    Comment: Educated HEP w/handout, reviewed hip precautions, family education for safety w/stair training    Acceptance, E,D, VU by  at 6/10/2021 1535    Comment: Educated on safe sequencing with bed mobility, ambulatory/toilet transfers, and gait. Reviewed HEP and hip precautions.   Significant Other Eager, E,H, VU,DU by  at 6/11/2021 1034    Comment: Educated HEP w/handout, reviewed hip precautions, family education for safety w/stair training                   Point: Precautions (Done)     Learning Progress Summary           Patient Eager, E,H, VU,DU by  at 6/11/2021 1034    Comment: Educated HEP w/handout, reviewed hip precautions, family education for safety w/stair training    Acceptance, E,D, VU by  at 6/10/2021 1535    Comment: Educated on safe sequencing with bed mobility, ambulatory/toilet transfers, and gait. Reviewed HEP and hip precautions.   Significant Other Eager, E,H, VU,DU by  at 6/11/2021 1034    Comment: Educated HEP w/handout, reviewed hip precautions, family education for safety w/stair training                               User Key     Initials Effective Dates Name Provider Type Discipline    PALMIRA 09/10/19 -  Juan R Mclean, PT Physical Therapist PT     06/01/21 -  Neva Muñoz, PT Physical Therapist PT              PT Recommendation and Plan     Plan of Care Reviewed With:  patient, spouse  Progress: improving  Outcome Summary: Pt. ambulated 350' stand by assist, 4 steps w/minimal assistance and cueing for technique. Pt. sit to stand transfer stand by assist. HEP prescribed.  present for observation and education for home safety recommendations with functional mobility. Recommend DC home with family assist.     Time Calculation:   PT Charges     Row Name 06/11/21 0900             Time Calculation    Start Time  0900  -SS      PT Received On  06/10/21  -SS      PT Goal Re-Cert Due Date  06/20/21  -SS         Time Calculation- PT    Total Timed Code Minutes- PT  30 minute(s)  -SS         Timed Charges    14887 - PT Therapeutic Exercise Minutes  8  -SS      87139 - Gait Training Minutes   18  -SS      22075 - PT Therapeutic Activity Minutes  4  -SS         Total Minutes    Timed Charges Total Minutes  30  -SS       Total Minutes  30  -SS        User Key  (r) = Recorded By, (t) = Taken By, (c) = Cosigned By    Initials Name Provider Type    SS Neva Muñoz, PT Physical Therapist        Therapy Charges for Today     Code Description Service Date Service Provider Modifiers Qty    14518948293 HC PT THER PROC EA 15 MIN 6/11/2021 Neva Muñoz, PT GP 1    [id number] HC GAIT TRAINING EA 15 MIN 6/11/2021 Nvea Muñoz, PT GP 1          PT G-Codes  Outcome Measure Options: AM-PAC 6 Clicks Basic Mobility (PT)  AM-PAC 6 Clicks Score (PT): 21  AM-PAC 6 Clicks Score (OT): 18    PT Discharge Summary  Anticipated Discharge Disposition (PT): home with outpatient therapy services  Reason for Discharge: Discharge from facility  Outcomes Achieved: Patient able to partially acheive established goals  Discharge Destination: Home with outpatient services    Neva Muñoz PT  6/11/2021         Electronically signed by Neva Muñoz PT at 06/11/21 3849

## 2021-06-11 NOTE — DISCHARGE SUMMARY
Patient Name: Chelsey Herrera  MRN: [id number]  : 1950  DOS: 2021    Attending: Silas Ravi MD    Primary Care Provider: Everardo Mccormack MD    Date of Admission:.6/10/2021  7:36 AM    Date of Discharge:  2021    Discharge Diagnosis:     Status post total replacement of right hip    Hypertension    Obesity, Class II, BMI 35-39.9    Fibromyalgia    Primary osteoarthritis of right hip    Chronic anticoagulation    Elevated hemoglobin A1c      Hospital Course  At admit:  Patient is a pleasant 70 y.o. female presented for scheduled surgery by Dr. Ravi.  Patient underwent right total hip arthroplasty under spinal anesthesia.  Surgery went well and she will be admitted for further medical management.  Patient reports chronic pain in her back and bilateral hips for which she has controlled with Norco.  She is on chronic anticoagulation with Eliquis.     When seen postop patient is doing well, pain is tolerable.  No nausea, vomiting, shortness of breath, or chest pain.     After admit  Patient was provided pain medications as needed for pain control.  Adjustments were made to pain medications to optimize postop pain management when indicated. Risks and benefits of opiate medications discussed with patient. ADOLFO report was reviewed.    She was seen by PT and OT  and has progressed well over her stay.    Pt used an IS for atelectasis prophylaxis and Eliquis along with mechanicals for DVT prophylaxis.    Home medications were resumed as appropriate, and labs were monitored and remained fairly stable.     With the progress she has made,  is ready for DC home today.      Discussed with patient regarding plan and she shows understanding and agreement.      Patient will have HHPT following discharge.        Procedures Performed  Procedure(s):  TOTAL HIP ARTHROPLASTY ANTERIOR MODIFIED RIGHT       Pertinent Test Results:    I reviewed the patient's new clinical results.   Results from last 7  "days   Lab Units 21  0349   WBC 10*3/mm3 12.71*   HEMOGLOBIN g/dL 8.2*   HEMATOCRIT % 26.8*   PLATELETS 10*3/mm3 292     Results from last 7 days   Lab Units 21  0349 06/10/21  0805   SODIUM mmol/L 140  --    POTASSIUM mmol/L 4.0 3.7   CHLORIDE mmol/L 104  --    CO2 mmol/L 26.0  --    BUN mg/dL 9  --    CREATININE mg/dL 0.71  --    CALCIUM mg/dL 8.9  --    GLUCOSE mg/dL 131*  --      I reviewed the patient's new imaging including images and reports.          Physical therapy  Plan of Care Reviewed With: patient, spouse  Progress: improving  Outcome Summary: Pt. ambulated 350' stand by assist, 4 steps w/minimal assistance and cueing for technique. Pt. sit to stand transfer stand by assist. HEP prescribed.  present for observation and education for home safety recommendations with functional mobility. Recommend DC home with family assist.    Discharge Assessment:      Visit Vitals  /60 (BP Location: Right arm, Patient Position: Lying)   Pulse 76   Temp 98.4 °F (36.9 °C) (Oral)   Resp 20   Ht 162.6 cm (64\")   Wt 98.5 kg (217 lb 2.5 oz)   LMP  (LMP Unknown)   SpO2 99%   BMI 37.27 kg/m²     Temp (24hrs), Av.7 °F (36.5 °C), Min:97.2 °F (36.2 °C), Max:98.6 °F (37 °C)      General Appearance:    Alert, cooperative, in no acute distress   Lungs:     Clear to auscultation,respirations regular, even and                   unlabored    Heart:    Regular rhythm and normal rate, normal S1 and S2   Abdomen:     Normal bowel sounds, no masses, no organomegaly, soft        non-tender, non-distended, no guarding, no rebound                 tenderness   Extremities:   CDI dressing over hip incision.   No c/c/e distally.   Pulses:   Pulses palpable and equal bilaterally   Skin:   No bleeding, bruising or rash   Neurologic:   Cranial nerves 2 - 12 grossly intact, sensation intact, Flexion and dorsiflexion intact bilateral feet.         Discharge Disposition:Home.         Discharge Medications      New " Medications      Instructions Start Date   acetaminophen 500 MG tablet  Commonly known as: TYLENOL   1,000 mg, Oral, Every 8 Hours, Take every 8 hours  as needed after 1 week      docusate sodium 100 MG capsule  Commonly known as: COLACE   100 mg, Oral, 2 Times Daily         Changes to Medications      Instructions Start Date   apixaban 5 MG tablet tablet  Commonly known as: ELIQUIS  What changed:   · when to take this  · additional instructions  · These instructions start on June 17, 2021. If you are unsure what to do until then, ask your doctor or other care provider.   5 mg, Oral, Every 12 Hours Scheduled, Take a dose of 2.5 mg every 12 hours till 6/16/21 evening dose, then resume 5 mg every 12 hours.   Start Date: June 17, 2021        Continue These Medications      Instructions Start Date   cyclobenzaprine 10 MG tablet  Commonly known as: FLEXERIL   10 mg, Oral, Nightly PRN      flecainide 50 MG tablet  Commonly known as: TAMBOCOR   50 mg, Oral, Every 12 Hours Scheduled      furosemide 40 MG tablet  Commonly known as: LASIX   40 mg, Oral, Daily PRN      HYDROcodone-acetaminophen 7.5-325 MG per tablet  Commonly known as: NORCO   1 tablet, Oral, Every 6 Hours PRN      levothyroxine 50 MCG tablet  Commonly known as: SYNTHROID, LEVOTHROID   50 mcg, Oral, Daily      metoprolol tartrate 50 MG tablet  Commonly known as: LOPRESSOR   TAKE 1 TABLET BY MOUTH TWICE A DAY      potassium chloride 20 MEQ CR tablet  Commonly known as: K-DUR,KLOR-CON   20 mEq, Oral, As Needed         Stop These Medications    diclofenac 1 % gel gel  Commonly known as: VOLTAREN            Discharge Diet: Resume prehospital diet.    Activity at Discharge:   Weight bearing as tolerated RLE, with total hip precautions.    Follow-up Appointments   per his orders.      Dragon disclaimer:  Part of this encounter note is an electronic transcription/translation of spoken language to printed text. The electronic translation of spoken language may  permit erroneous, or at times, nonsensical words or phrases to be inadvertently transcribed; Although I have reviewed the note for such errors, some may still exist.       Yanely Singh MD  06/11/21  11:30 EDT

## 2021-06-11 NOTE — PROGRESS NOTES
"      [facility] [place] Orthopaedic Surgery Progress Note    Subjective      LOS: 1 day   Patient Care Team:  Everardo Mccormack MD as PCP - General (Adolescent Medicine)  Jeremiah Sinclair MD as Consulting Physician (Cardiology)    CC: Right hip pain    Interval History:   Patient resting comfortably in a chair when I saw her earlier this morning.  Pain well controlled.  She would like to go home today.    Objective      Vital Signs  Temp (24hrs), Av.7 °F (36.5 °C), Min:97.2 °F (36.2 °C), Max:98.6 °F (37 °C)      /60 (BP Location: Right arm, Patient Position: Lying)   Pulse 76   Temp 98.4 °F (36.9 °C) (Oral)   Resp 20   Ht 162.6 cm (64\")   Wt 98.5 kg (217 lb 2.5 oz)   LMP  (LMP Unknown)   SpO2 99%   BMI 37.27 kg/m²     Examination:   Examination of the right hip: The wound is clean, dry, and intact.  Knee flexion, knee extension, ankle dorsiflexion, ankle plantar flexion, and EHL are intact.  Sensation intact in the foot to light touch.   Thigh is soft and nontender.      Labs:  Results from last 7 days   Lab Units 21  0349   WBC 10*3/mm3 12.71*   HEMOGLOBIN g/dL 8.2*   HEMATOCRIT % 26.8*   MCV fL 84.0   PLATELETS 10*3/mm3 292       Radiology:  Imaging Results (Last 24 Hours)     ** No results found for the last 24 hours. **          PT:  Physical Therapy - Plan of Care Review - Outcome Summary:  Outcome Summary: Pt. ambulated 350' stand by assist, 4 steps w/minimal assistance and cueing for technique. Pt. sit to stand transfer stand by assist. HEP prescribed.  present for observation and education for home safety recommendations with functional mobility. Recommend DC home with family assist. (21 1023)]       Results Review:     I reviewed the patient's new clinical results.    Assessment and Plan     Assessment:   Status post right total hip arthroplasty-doing well      Status post total replacement of right hip    Hypertension    Obesity, Class II, BMI 35-39.9    Fibromyalgia    " Primary osteoarthritis of right hip    Chronic anticoagulation    Elevated hemoglobin A1c      Plan for disposition: Plan for discharge home today as long as she is cleared medically and by physical therapy.  Follow-up with me in 3 weeks as planned.      Future Appointments   Date Time Provider Department Center   7/7/2021  3:00 PM Silas Ravi MD [facility]           Silas Ravi MD  06/11/21  10:51 EDT

## 2021-06-11 NOTE — PLAN OF CARE
Goal Outcome Evaluation:  Plan of Care Reviewed With: patient, spouse        Progress: improving  Outcome Summary: Pt. ambulated 350' stand by assist, 4 steps w/minimal assistance and cueing for technique. Pt. sit to stand transfer stand by assist. HEP prescribed.  present for observation and education for home safety recommendations with functional mobility. Recommend DC home with family assist.

## 2021-06-11 NOTE — PLAN OF CARE
Problem: Adult Inpatient Plan of Care  Goal: Absence of Hospital-Acquired Illness or Injury  Intervention: Identify and Manage Fall Risk  Recent Flowsheet Documentation  Taken 6/11/2021 0000 by Sarbjit Serna RN  Safety Promotion/Fall Prevention:   activity supervised   assistive device/personal items within reach   elopement precautions   lighting adjusted   clutter free environment maintained   gait belt   fall prevention program maintained   mobility aid in reach   muscle strengthening facilitated   nonskid shoes/slippers when out of bed   room organization consistent   safety round/check completed  Taken 6/10/2021 2200 by Sarbjit Serna RN  Safety Promotion/Fall Prevention:   assistive device/personal items within reach   elopement precautions   fall prevention program maintained   gait belt   activity supervised   clutter free environment maintained   mobility aid in reach   lighting adjusted   nonskid shoes/slippers when out of bed   muscle strengthening facilitated   room organization consistent   safety round/check completed  Taken 6/10/2021 2000 by Sarbjit Serna RN  Safety Promotion/Fall Prevention:   activity supervised   assistive device/personal items within reach   clutter free environment maintained   elopement precautions   fall prevention program maintained   gait belt   lighting adjusted   mobility aid in reach   muscle strengthening facilitated   nonskid shoes/slippers when out of bed   room organization consistent   safety round/check completed  Intervention: Prevent Skin Injury  Recent Flowsheet Documentation  Taken 6/11/2021 0000 by Sarbjit Serna RN  Body Position: neutral body alignment  Taken 6/10/2021 2200 by Sarbjit Serna RN  Body Position: neutral body alignment  Taken 6/10/2021 2000 by Sarbjit eSrna RN  Body Position: neutral body alignment  Intervention: Prevent and Manage VTE (venous thromboembolism) Risk  Recent Flowsheet Documentation  Taken  6/11/2021 0000 by Sarbjit Serna RN  VTE Prevention/Management:   bilateral   foot pump device off  Taken 6/10/2021 2200 by Sarbjit Serna RN  VTE Prevention/Management:   bilateral   foot pump device on  Taken 6/10/2021 2000 by Sarbjit Serna RN  VTE Prevention/Management:   bilateral   foot pump device on  Goal: Optimal Comfort and Wellbeing  Intervention: Provide Person-Centered Care  Recent Flowsheet Documentation  Taken 6/10/2021 2000 by Sarbjit Serna RN  Trust Relationship/Rapport: care explained     Problem: Joint Function Impaired (Hip Arthroplasty)  Goal: Optimal Functional Ability  Intervention: Protect Joint Integrity  Recent Flowsheet Documentation  Taken 6/11/2021 0000 by Sarbjit Serna RN  Positioning/Transfer Devices:   pillows   in use  Taken 6/10/2021 2200 by Sarbjit Serna RN  Positioning/Transfer Devices:   pillows   in use  Taken 6/10/2021 2000 by Sarbjit Serna RN  Positioning/Transfer Devices:   pillows   in use     Problem: Ongoing Anesthesia Effects (Hip Arthroplasty)  Goal: Anesthesia/Sedation Recovery  Intervention: Optimize Anesthesia Recovery  Recent Flowsheet Documentation  Taken 6/11/2021 0000 by Sarbjit Serna, RN  Safety Promotion/Fall Prevention:   activity supervised   assistive device/personal items within reach   elopement precautions   lighting adjusted   clutter free environment maintained   gait belt   fall prevention program maintained   mobility aid in reach   muscle strengthening facilitated   nonskid shoes/slippers when out of bed   room organization consistent   safety round/check completed  Taken 6/10/2021 2200 by Sarbjit Serna, RN  Safety Promotion/Fall Prevention:   assistive device/personal items within reach   elopement precautions   fall prevention program maintained   gait belt   activity supervised   clutter free environment maintained   mobility aid in reach   lighting adjusted   nonskid shoes/slippers when out of bed    muscle strengthening facilitated   room organization consistent   safety round/check completed  Taken 6/10/2021 2000 by Sarbjit Serna, RN  $ Incentive Spirometry: yes  Safety Promotion/Fall Prevention:   activity supervised   assistive device/personal items within reach   clutter free environment maintained   elopement precautions   fall prevention program maintained   gait belt   lighting adjusted   mobility aid in reach   muscle strengthening facilitated   nonskid shoes/slippers when out of bed   room organization consistent   safety round/check completed   Goal Outcome Evaluation:      VSS. Pt rested well throughout the night. Pt voiding well. Pain controlled with PRN pain medication. Will continue to monitor.

## 2021-06-11 NOTE — THERAPY DISCHARGE NOTE
Patient Name: Chelsey Herrera  : 1950    MRN: [id number]                              Today's Date: 2021       Admit Date: 6/10/2021    Visit Dx:     ICD-10-CM ICD-9-CM   1. Status post total replacement of right hip  Z96.641 V43.64   2. Primary osteoarthritis of right hip  M16.11 715.15     Patient Active Problem List   Diagnosis   • Persistent atrial fibrillation (CMS/HCC)   • Hypertension   • Obesity, Class II, BMI 35-39.9   • Fibromyalgia   • Nephrolithiasis   • History of surgery   • Primary osteoarthritis of right hip   • Status post total replacement of right hip   • Chronic anticoagulation   • Elevated hemoglobin A1c     Past Medical History:   Diagnosis Date   • Arthritis    • Back pain    • Bilateral kidney stones    • Chronic fatigue    • Chronic pain    • Dental bridge present    • Fibromyalgia    • Hypertension    • Hypothyroidism    • Insomnia    • Muscle weakness (generalized)    • Palpitations    • Persistent atrial fibrillation (CMS/HCC) 2017    A) Diagnosed  by PCP when presenting with extreme fatigue B) CHADS-VASc = 3 (age, female, HTN)  C) 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 Changed to flecainide. F) ECV 16 on flecainide. Recurrence of afib about 24hrs later. F) TTE 10/20/16 bin   • PONV (postoperative nausea and vomiting)    • Sciatica of right side    • SOB (shortness of breath)    • Urination frequency    • Wears eyeglasses     reading     Past Surgical History:   Procedure Laterality Date   • CARDIAC CATHETERIZATION      NO STENTS   • CARDIAC ELECTROPHYSIOLOGY PROCEDURE N/A 2017    Procedure: PVA. DNS meds.;  Surgeon: Miguelito Owusu DO;  Location: [facility] INVASIVE LOCATION;  Service:    • CARDIOVERSION      X 2   • CARPAL TUNNEL RELEASE Bilateral    • COLONOSCOPY         • GALLBLADDER SURGERY     •  GASTRIC BYPASS     • REPLACEMENT TOTAL KNEE BILATERAL Bilateral    • SINUS SURGERY      X 2   • TOTAL HIP ARTHROPLASTY Right 6/10/2021    Procedure: TOTAL HIP ARTHROPLASTY ANTERIOR MODIFIED RIGHT;  Surgeon: Silas Ravi MD;  Location: [facility];  Service: Orthopedics;  Laterality: Right;   • UTERINE SUSPENSION LAPAROSCOPIC       General Information     Row Name 06/11/21 1004          Physical Therapy Time and Intention    Document Type  therapy note (daily note);discharge treatment  -SS     Mode of Treatment  physical therapy  -     Row Name 06/11/21 1004          General Information    Patient Profile Reviewed  yes  -SS     Existing Precautions/Restrictions  fall;right;hip, anterior;other (see comments) modified anterior approach  -     Barriers to Rehab  none identified  -     Row Name 06/11/21 1004          Home Main Entrance    Number of Stairs, Main Entrance  four  -SS     Stair Railings, Main Entrance  railing on left side (ascending)  -     Row Name 06/11/21 1004          Cognition    Orientation Status (Cognition)  oriented x 4  -     Row Name 06/11/21 1004          Safety Issues, Functional Mobility    Safety Issues Affecting Function (Mobility)  ability to follow commands  -     Impairments Affecting Function (Mobility)  endurance/activity tolerance;strength;pain;range of motion (ROM);balance  -       User Key  (r) = Recorded By, (t) = Taken By, (c) = Cosigned By    Initials Name Provider Type     Neva Muñoz, PT Physical Therapist        Mobility     Row Name 06/11/21 1011          Bed Mobility    Scooting/[place]ridging [place] (Bed Mobility)  not tested  -     Supine-Sit [place] (Bed Mobility)  not tested  -     Comment (Bed Mobility)  Pt. received up in chair. Pt. denies difficulty w/bed mobility  -     Row Name 06/11/21 1011          Transfers    Comment (Transfers)  Cues for hand placement  -     Row Name 06/11/21 1011          Sit-Stand Transfer    Sit-Stand  [place] (Transfers)  modified independence  -     Assistive Device (Sit-Stand Transfers)  walker, front-wheeled  -SS     Row Name 06/11/21 1011          Gait/Stairs (Locomotion)    [place] Level (Gait)  verbal cues;standby assist;1 person assist  -     Assistive Device (Gait)  walker, front-wheeled  -     Distance in Feet (Gait)  350  -     Deviations/Abnormal Patterns (Gait)  -- cues for decreasing gait speed, slightly impulsive  -     Bilateral Gait Deviations  forward flexed posture Cues for upright posture, looking ahead  -     [place] Level (Stairs)  minimum assist (75% patient effort);1 person assist  -     Assistive Device (Stairs)  cane, straight hand rail  -     Handrail Location (Stairs)  left side (ascending);right side (descending)  -     Number of Steps (Stairs)  4  -SS     Ascending Technique (Stairs)  step-to-step  -SS     Descending Technique (Stairs)  step-to-step  -SS     Comment (Gait/Stairs)  Pt. ambulated with step through pattern with improved speed from previous sessions, slightly impulsive. Verbal cues for upright posture, looking ahead and controlling movement. Pt. performed 4 steps ascending/descending w/one hand rail and cane, requiring min cueing for technique and good carryover.  present during stair training and educated for assistance and body positioning.  -     Row Name 06/11/21 1011          Mobility    Extremity Weight-bearing Status  right lower extremity  -     Right Lower Extremity (Weight-bearing Status)  weight-bearing as tolerated (WBAT)  -       User Key  (r) = Recorded By, (t) = Taken By, (c) = Cosigned By    Initials Name Provider Type     Neva Muñoz PT Physical Therapist        Obj/Interventions     Row Name 06/11/21 1020          Motor Skills    Motor Skills  therapeutic exercise  -     Therapeutic Exercise  hip;knee;ankle  -     Row Name 06/11/21 1020          Hip (Therapeutic Exercise)    Hip (Therapeutic  Exercise)  strengthening exercise  -     Hip Isometrics (Therapeutic Exercise)  bilateral;gluteal sets;3 second hold;10 repetitions  -     Hip Strengthening (Therapeutic Exercise)  right;aBduction;aDduction;sitting;flexion;standing;10 repetitions;other (see comments) heel raises and mini squats in standing, 10 repetitions  -     Row Name 06/11/21 1020          Knee (Therapeutic Exercise)    Knee (Therapeutic Exercise)  strengthening exercise  -     Knee Isometrics (Therapeutic Exercise)  bilateral;quad sets;sitting;10 repetitions  -     Knee Strengthening (Therapeutic Exercise)  right;heel slides;LAQ (long arc quad);sitting;10 repetitions  -     Row Name 06/11/21 1020          Ankle (Therapeutic Exercise)    Ankle (Therapeutic Exercise)  strengthening exercise  -     Ankle AROM (Therapeutic Exercise)  bilateral;dorsiflexion;plantarflexion;10 repetitions;sitting  -     Row Name 06/11/21 1020          Balance    Balance Assessment  standing static balance  -     Static Standing Balance  WNL;supported  -SS     Dynamic Standing Balance  WNL;supported  -SS     Comment, Balance  standing balance WNL w/UE support on walker  -       User Key  (r) = Recorded By, (t) = Taken By, (c) = Cosigned By    Initials Name Provider Type     Neva Muñoz, PT Physical Therapist        Goals/Plan     Row Name 06/11/21 1032          Bed Mobility Goal 1 (PT)    Activity/Assistive Device (Bed Mobility Goal 1, PT)  sit to supine/supine to sit  -     [place] Level/Cues Needed (Bed Mobility Goal 1, PT)  modified independence  -SS     Time Frame (Bed Mobility Goal 1, PT)  long term goal (LTG);3 days  -     Progress/Outcomes (Bed Mobility Goal 1, PT)  goal met  -     Row Name 06/11/21 1032          Transfer Goal 1 (PT)    Activity/Assistive Device (Transfer Goal 1, PT)  sit-to-stand/stand-to-sit;walker, rolling  -     [place] Level/Cues Needed (Transfer Goal 1, PT)  contact guard assist  -SS     Time  Frame (Transfer Goal 1, PT)  long term goal (LTG);3 days  -SS     Progress/Outcome (Transfer Goal 1, PT)  goal met  -     Row Name 06/11/21 1032          Gait Training Goal 1 (PT)    Activity/Assistive Device (Gait Training Goal 1, PT)  gait (walking locomotion);walker, rolling  -SS     [place] Level (Gait Training Goal 1, PT)  modified independence  -SS     Distance (Gait Training Goal 1, PT)  150 feet  -SS     Time Frame (Gait Training Goal 1, PT)  long term goal (LTG);3 days  -SS     Progress/Outcome (Gait Training Goal 1, PT)  goal met  -SS     Row Name 06/11/21 1032          Stairs Goal 1 (PT)    Activity/Assistive Device (Stairs Goal 1, PT)  stairs, all skills;using handrail, left;cane, straight  -SS     [place] Level/Cues Needed (Stairs Goal 1, PT)  contact guard assist  -SS     Number of Stairs (Stairs Goal 1, PT)  4  -SS     Time Frame (Stairs Goal 1, PT)  long term goal (LTG);3 days  -SS     Progress/Outcome (Stairs Goal 1, PT)  goal partially met  -       User Key  (r) = Recorded By, (t) = Taken By, (c) = Cosigned By    Initials Name Provider Type     Neva Muñoz, PT Physical Therapist        Clinical Impression     Row Name 06/11/21 1023          Pain    Additional Documentation  Pain Scale: Numbers Pre/Post-Treatment (Group)  -     Row Name 06/11/21 1023          Pain Scale: Numbers Pre/Post-Treatment    Pretreatment Pain Rating  3/10  -     Posttreatment Pain Rating  4/10  -     Pain Location - Side  Right  -     Pain Location - Orientation  generalized  -     Pain Location  hip  -     Pre/Posttreatment Pain Comment  Pt. requesting pain meds post PT session  -     Pain Intervention(s)  Cold applied  -     Row Name 06/11/21 1023          Plan of Care Review    Plan of Care Reviewed With  patient;spouse  -     Progress  improving  -     Outcome Summary  Pt. ambulated 350' stand by assist, 4 steps w/minimal assistance and cueing for technique. Pt. sit to stand  transfer stand by assist. HEP prescribed.  present for observation and education for home safety recommendations with functional mobility. Recommend DC home with family assist.  -     Row Name 06/11/21 1023          Therapy Assessment/Plan (PT)    Patient/Family Therapy Goals Statement (PT)  To return home  -     Criteria for Skilled Interventions Met (PT)  yes;meets criteria;skilled treatment is necessary  -     Row Name 06/11/21 1023          Positioning and Restraints    Pre-Treatment Position  sitting in chair/recliner  -     Post Treatment Position  chair  -SS     In Chair  notified nsg;reclined;sitting;call light within reach;encouraged to call for assist;exit alarm on;with family/caregiver  -       User Key  (r) = Recorded By, (t) = Taken By, (c) = Cosigned By    Initials Name Provider Type    Neva Bell PT Physical Therapist        Outcome Measures     Row Name 06/11/21 1033          How much help from another person do you currently need...    Turning from your back to your side while in flat bed without using bedrails?  4  -SS     Moving from lying on back to sitting on the side of a flat bed without bedrails?  4  -SS     Moving to and from a bed to a chair (including a wheelchair)?  3  -SS     Standing up from a chair using your arms (e.g., wheelchair, bedside chair)?  4  -SS     Climbing 3-5 steps with a railing?  3  -SS     To walk in hospital room?  3  -SS     AM-PAC 6 Clicks Score (PT)  21  -     Row Name 06/11/21 1033 06/11/21 0909       Functional Assessment    Outcome Measure Options  AM-PAC 6 Clicks Basic Mobility (PT)  -  AM-PAC 6 Clicks Daily Activity (OT)  -AR      User Key  (r) = Recorded By, (t) = Taken By, (c) = Cosigned By    Initials Name Provider Type    Maine Ibarra OT Occupational Therapist    Neva Bell PT Physical Therapist        Physical Therapy Education                 Title: PT OT SLP Therapies (Done)     Topic: Physical Therapy  (Done)     Point: Mobility training (Done)     Learning Progress Summary           Patient Eager, E,H, VU,DU by  at 6/11/2021 1034    Comment: Educated HEP w/handout, reviewed hip precautions, family education for safety w/stair training    Acceptance, E,D, VU by PALMIRA at 6/10/2021 1535    Comment: Educated on safe sequencing with bed mobility, ambulatory/toilet transfers, and gait. Reviewed HEP and hip precautions.   Significant Other Eager, E,H, VU,DU by  at 6/11/2021 1034    Comment: Educated HEP w/handout, reviewed hip precautions, family education for safety w/stair training                   Point: Home exercise program (Done)     Learning Progress Summary           Patient Eager, E,H, VU,DU by  at 6/11/2021 1034    Comment: Educated HEP w/handout, reviewed hip precautions, family education for safety w/stair training    Acceptance, E,D, VU by PALMIRA at 6/10/2021 1535    Comment: Educated on safe sequencing with bed mobility, ambulatory/toilet transfers, and gait. Reviewed HEP and hip precautions.   Significant Other Eager, E,H, VU,DU by  at 6/11/2021 1034    Comment: Educated HEP w/handout, reviewed hip precautions, family education for safety w/stair training                   Point: Body mechanics (Done)     Learning Progress Summary           Patient Eager, E,H, VU,DU by  at 6/11/2021 1034    Comment: Educated HEP w/handout, reviewed hip precautions, family education for safety w/stair training    Acceptance, E,D, VU by PALMIRA at 6/10/2021 1535    Comment: Educated on safe sequencing with bed mobility, ambulatory/toilet transfers, and gait. Reviewed HEP and hip precautions.   Significant Other Eager, E,H, VU,DU by  at 6/11/2021 1034    Comment: Educated HEP w/handout, reviewed hip precautions, family education for safety w/stair training                   Point: Precautions (Done)     Learning Progress Summary           Patient Eager, E,H, VU,DU by  at 6/11/2021 1034    Comment: Educated HEP w/handout,  reviewed hip precautions, family education for safety w/stair training    Acceptance, E,D, VU by  at 6/10/2021 1535    Comment: Educated on safe sequencing with bed mobility, ambulatory/toilet transfers, and gait. Reviewed HEP and hip precautions.   Significant Other Eager, MURALI,H, ROSSANA,DU by  at 6/11/2021 1034    Comment: Educated HEP w/handout, reviewed hip precautions, family education for safety w/stair training                               User Key     Initials Effective Dates Name Provider Type Discipline     09/10/19 -  Juan R Mclean, PT Physical Therapist PT     06/01/21 -  Neva Muñoz PT Physical Therapist PT              PT Recommendation and Plan     Plan of Care Reviewed With: patient, spouse  Progress: improving  Outcome Summary: Pt. ambulated 350' stand by assist, 4 steps w/minimal assistance and cueing for technique. Pt. sit to stand transfer stand by assist. HEP prescribed.  present for observation and education for home safety recommendations with functional mobility. Recommend DC home with family assist.     Time Calculation:   PT Charges     Row Name 06/11/21 0900             Time Calculation    Start Time  0900  -      PT Received On  06/10/21  -      PT Goal Re-Cert Due Date  06/20/21  -         Time Calculation- PT    Total Timed Code Minutes- PT  30 minute(s)  -SS         Timed Charges    45508 - PT Therapeutic Exercise Minutes  8  -SS      96147 - Gait Training Minutes   18  -SS      41830 - PT Therapeutic Activity Minutes  4  -SS         Total Minutes    Timed Charges Total Minutes  30  -SS       Total Minutes  30  -SS        User Key  (r) = Recorded By, (t) = Taken By, (c) = Cosigned By    Initials Name Provider Type     Neva Muñoz, SANDEEP Physical Therapist        Therapy Charges for Today     Code Description Service Date Service Provider Modifiers Qty    68657936591 HC PT THER PROC EA 15 MIN 6/11/2021 Neva Muñoz, PT GP 1    88253657239 HC GAIT TRAINING EA 15 MIN  6/11/2021 Neva Muñoz, PT GP 1          PT G-Codes  Outcome Measure Options: AM-PAC 6 Clicks Basic Mobility (PT)  AM-PAC 6 Clicks Score (PT): 21  AM-PAC 6 Clicks Score (OT): 18    PT Discharge Summary  Anticipated Discharge Disposition (PT): home with outpatient therapy services  Reason for Discharge: Discharge from facility  Outcomes Achieved: Patient able to partially acheive established goals  Discharge Destination: Home with outpatient services    Neva Muñoz PT  6/11/2021

## 2021-06-11 NOTE — PLAN OF CARE
Goal Outcome Evaluation:  Plan of Care Reviewed With: patient, spouse           Outcome Summary: OT educated pt and spouse on ADL retraining and transfer training, issued necessary AE to assist. Pt completed LB dressing with min assist-CGA using AE, toileing with supervision and transfer training with supervision. Recommend DC home with family assist.

## 2021-06-11 NOTE — THERAPY DISCHARGE NOTE
Acute Care - Occupational Therapy Discharge  [facility]    Patient Name: Chelsey Herrera  : 1950    MRN: [id number]                              Today's Date: 2021       Admit Date: 6/10/2021    Visit Dx:     ICD-10-CM ICD-9-CM   1. Primary osteoarthritis of right hip  M16.11 715.15     Patient Active Problem List   Diagnosis   • Persistent atrial fibrillation (CMS/HCC)   • Hypertension   • Obesity, Class II, BMI 35-39.9   • Fibromyalgia   • Nephrolithiasis   • History of surgery   • Primary osteoarthritis of right hip   • Status post total replacement of right hip   • Chronic anticoagulation   • Elevated hemoglobin A1c     Past Medical History:   Diagnosis Date   • Arthritis    • Back pain    • Bilateral kidney stones    • Chronic fatigue    • Chronic pain    • Dental bridge present    • Fibromyalgia    • Hypertension    • Hypothyroidism    • Insomnia    • Muscle weakness (generalized)    • Palpitations    • Persistent atrial fibrillation (CMS/HCC) 2017    A) Diagnosed  by PCP when presenting with extreme fatigue B) CHADS-VASc = 3 (age, female, HTN)  C) 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 Changed to flecainide. F) ECV 16 on flecainide. Recurrence of afib about 24hrs later. F) TTE 10/20/16 bin   • PONV (postoperative nausea and vomiting)    • Sciatica of right side    • SOB (shortness of breath)    • Urination frequency    • Wears eyeglasses     reading     Past Surgical History:   Procedure Laterality Date   • CARDIAC CATHETERIZATION      NO STENTS   • CARDIAC ELECTROPHYSIOLOGY PROCEDURE N/A 2017    Procedure: PVA. DNS meds.;  Surgeon: Miguelito Owusu DO;  Location: [facility] INVASIVE LOCATION;  Service:    • CARDIOVERSION      X 2   • CARPAL TUNNEL RELEASE Bilateral    • COLONOSCOPY         • GALLBLADDER SURGERY     • GASTRIC  BYPASS     • REPLACEMENT TOTAL KNEE BILATERAL Bilateral    • SINUS SURGERY      X 2   • TOTAL HIP ARTHROPLASTY Right 6/10/2021    Procedure: TOTAL HIP ARTHROPLASTY ANTERIOR MODIFIED RIGHT;  Surgeon: Silas Ravi MD;  Location: [facility];  Service: Orthopedics;  Laterality: Right;   • UTERINE SUSPENSION LAPAROSCOPIC       General Information     Row Name 06/11/21 0840          OT Time and Intention    Document Type  evaluation;therapy note (daily note);discharge treatment  -AR     Mode of Treatment  individual therapy;occupational therapy  -AR     Row Name 06/11/21 0840          General Information    Patient Profile Reviewed  yes  -AR     Prior Level of Function  min assist:;all household mobility;community mobility;gait;transfer;ADL's  -AR     Existing Precautions/Restrictions  fall;right;hip, anterior;other (see comments) modified anterior approach  -AR     Barriers to Rehab  none identified  -AR     Row Name 06/11/21 0840          Living Environment    Lives With  spouse  -AR     Row Name 06/11/21 0840          Home Main Entrance    Number of Stairs, Main Entrance  four  -AR     Stair Railings, Main Entrance  railing on left side (ascending)  -AR     Row Name 06/11/21 0840          Stairs Within Home, Primary    Number of Stairs, Within Home, Primary  none  -AR     Stairs Comment, Within Home, Primary  Pt has walk-in shower and comfort-height commode  -AR     Row Name 06/11/21 0840          Cognition    Orientation Status (Cognition)  oriented x 4  -AR     Row Name 06/11/21 0840          Safety Issues, Functional Mobility    Safety Issues Affecting Function (Mobility)  impulsivity;safety precaution awareness;safety precautions follow-through/compliance  -AR     Impairments Affecting Function (Mobility)  endurance/activity tolerance;strength;pain;range of motion (ROM);balance  -AR     Row Name 06/11/21 0840          Relationship/Environment    Name(s) of Who Lives With Patient  Pt lives with spouse who can  assist as needed  -AR       User Key  (r) = Recorded By, (t) = Taken By, (c) = Cosigned By    Initials Name Provider Type    AR Maine Wilder, OT Occupational Therapist        Mobility/ADL's     Row Name 06/11/21 0853          Bed Mobility    Comment (Bed Mobility)  Pt received up in chair. Issued leg  and educated pt on use.  -AR     Row Name 06/11/21 0853          Transfers    Transfers  sit-stand transfer;toilet transfer  -AR     Comment (Transfers)  Cues to advance RLE for comfort during stand-to-sit transition, cues for hand placement. Reviewed safe car transfer technique. Issued handout on shower chairs as pt interested in purchasing.  -AR     Sit-Stand [place] (Transfers)  verbal cues;supervision  -AR     [place] Level (Toilet Transfer)  supervision;verbal cues  -AR     Assistive Device (Toilet Transfer)  raised toilet seat;grab bars/safety frame  -AR     Row Name 06/11/21 0853          Sit-Stand Transfer    Assistive Device (Sit-Stand Transfers)  walker, front-wheeled  -AR     Row Name 06/11/21 0853          Toilet Transfer    Type (Toilet Transfer)  sit-stand;stand-sit  -AR     Row Name 06/11/21 0853          Functional Mobility    Functional Mobility- Ind. Level  supervision required  -AR     Functional Mobility- Device  rolling walker  -AR     Functional Mobility-Distance (Feet)  10  -AR     Row Name 06/11/21 0853          Activities of Daily Living    BADL Assessment/Intervention  bathing;upper body dressing;lower body dressing;grooming;feeding;toileting  -AR     Row Name 06/11/21 0853          Mobility    Extremity Weight-bearing Status  right lower extremity  -AR     Right Lower Extremity (Weight-bearing Status)  weight-bearing as tolerated (WBAT)  -AR     Row Name 06/11/21 0853          Bathing Assessment/Intervention    Comment (Bathing)  Issued LH sponge and educated pt on use  -AR     Row Name 06/11/21 0853          Upper Body Dressing Assessment/Training    [place] Level  (Upper Body Dressing)  doff;don;front opening garment;bra/undergarment;pull-over garment;supervision;set up  -AR     Position (Upper Body Dressing)  supported sitting  -AR     Row Name 06/11/21 0853          Lower Body Dressing Assessment/Training    [place] Level (Lower Body Dressing)  don;pants/bottoms;socks;contact guard assist;verbal cues;doff;minimum assist (75% patient effort);shoes/slippers  -AR     Assistive Devices (Lower Body Dressing)  reacher;sock-aid  -AR     Position (Lower Body Dressing)  supported sitting;supported standing  -AR     Comment (Lower Body Dressing)  Pt limited with pain while donning underpants, issued reacher to assist. Educated pt on anterior hip precautions and use of AE as needed, as well as incorporation of billie-dressing technique. Educated pt on use of AE to assist with LB dressing for comfort.  -AR     Row Name 06/11/21 0853          Grooming Assessment/Training    [place] Level (Grooming)  supervision;wash face, hands  -AR     Position (Grooming)  supported standing;sink side  -AR     Row Name 06/11/21 0853          Self-Feeding Assessment/Training    [place] Level (Feeding)  liquids to mouth;supervision  -AR     Position (Self-Feeding)  supported sitting  -AR     Row Name 06/11/21 0853          Toileting Assessment/Training    [place] Level (Toileting)  toileting skills;supervision;verbal cues  -AR     Position (Toileting)  supported sitting;supported standing  -AR       User Key  (r) = Recorded By, (t) = Taken By, (c) = Cosigned By    Initials Name Provider Type    Maine Ibarra OT Occupational Therapist        Obj/Interventions     Row Name 06/11/21 0905          Sensory Assessment (Somatosensory)    Sensory Assessment (Somatosensory)  UE sensation intact  -AR     Row Name 06/11/21 0905          Vision Assessment/Intervention    Visual Impairment/Limitations  WNL  -AR     Row Name 06/11/21 0905          Range of Motion Comprehensive    General  Range of Motion  bilateral upper extremity ROM WNL  -AR     Row Name 06/11/21 0905          Strength Comprehensive (MMT)    Comment, General Manual Muscle Testing (MMT) Assessment  BUE WFL for ADLs  -AR     Row Name 06/11/21 0905          Balance    Balance Assessment  sitting static balance;sitting dynamic balance;standing static balance;standing dynamic balance  -AR     Static Sitting Balance  WNL;supported;sitting in chair  -AR     Dynamic Sitting Balance  WNL;supported;sitting in chair  -AR     Static Standing Balance  WFL;supported;standing  -AR     Dynamic Standing Balance  WFL;supported;standing  -AR       User Key  (r) = Recorded By, (t) = Taken By, (c) = Cosigned By    Initials Name Provider Type    Maine Ibarra, RONNY Occupational Therapist        Goals/Plan     Row Name 06/11/21 0909          Transfer Goal 1 (OT)    Activity/Assistive Device (Transfer Goal 1, OT)  sit-to-stand/stand-to-sit;toilet  -AR     [place] Level/Cues Needed (Transfer Goal 1, OT)  verbal cues required;contact guard assist  -AR     Time Frame (Transfer Goal 1, OT)  long term goal (LTG);1 week  -AR     Progress/Outcome (Transfer Goal 1, OT)  goal met  -AR     Row Name 06/11/21 0909          Dressing Goal 1 (OT)    Activity/Device (Dressing Goal 1, OT)  lower body dressing;reacher;sock-aid  -AR     [place]/Cues Needed (Dressing Goal 1, OT)  minimum assist (75% or more patient effort);verbal cues required  -AR     Time Frame (Dressing Goal 1, OT)  long term goal (LTG);1 day  -AR     Progress/Outcome (Dressing Goal 1, OT)  goal met  -AR     Row Name 06/11/21 0909          Toileting Goal 1 (OT)    Activity/Device (Toileting Goal 1, OT)  toileting skills, all;grab bar/safety frame;raised toilet seat  -AR     [place] Level/Cues Needed (Toileting Goal 1, OT)  verbal cues required;contact guard assist  -AR     Time Frame (Toileting Goal 1, OT)  long term goal (LTG);1 day  -AR     Progress/Outcome (Toileting Goal 1, OT)   goal met  -AR     Row Name 06/11/21 0909          Therapy Assessment/Plan (OT)    Planned Therapy Interventions (OT)  adaptive equipment training;BADL retraining;functional balance retraining;IADL retraining;occupation/activity based interventions;patient/caregiver education/training;transfer/mobility retraining  -AR       User Key  (r) = Recorded By, (t) = Taken By, (c) = Cosigned By    Initials Name Provider Type    AR Maine Wilder, RONNY Occupational Therapist        Clinical Impression     Row Name 06/11/21 0906          Pain Assessment    Additional Documentation  Pain Scale: Numbers Pre/Post-Treatment (Group)  -AR     Row Name 06/11/21 0906          Pain Scale: Numbers Pre/Post-Treatment    Pretreatment Pain Rating  3/10  -AR     Posttreatment Pain Rating  3/10  -AR     Pain Location - Side  Right  -AR     Pain Location - Orientation  generalized  -AR     Pain Location  hip  -AR     Pre/Posttreatment Pain Comment  Pt declined need for pain medication  -AR     Pain Intervention(s)  Cold applied;Repositioned  -AR     Row Name 06/11/21 0906          Plan of Care Review    Plan of Care Reviewed With  patient;spouse  -AR     Outcome Summary  OT educated pt and spouse on ADL retraining and transfer training, issued necessary AE to assist. Pt completed LB dressing with min assist-CGA using AE, toileing with supervision and transfer training with supervision. Recommend DC home with family assist.  -AR     [facility] Name 06/11/21 0906          Therapy Assessment/Plan (OT)    Criteria for Skilled Therapeutic Interventions Met (OT)  yes  -AR     Therapy Frequency (OT)  evaluation only  -AR     Row Name 06/11/21 0906          Therapy Plan Review/Discharge Plan (OT)    Anticipated Discharge Disposition (OT)  home with assist  -AR     Row Name 06/11/21 0906          Vital Signs    Pre Patient Position  Sitting  -AR     Intra Patient Position  Standing  -AR     Post Patient Position  Sitting  -AR     [facility] Name 06/11/21 0906           Positioning and Restraints    Pre-Treatment Position  sitting in chair/recliner  -AR     Post Treatment Position  chair  -AR     In Chair  reclined;call light within reach;encouraged to call for assist;exit alarm on;legs elevated;with family/caregiver  -AR       User Key  (r) = Recorded By, (t) = Taken By, (c) = Cosigned By    Initials Name Provider Type    Maine Ibarra OT Occupational Therapist        Outcome Measures     Row Name 06/11/21 0909          How much help from another is currently needed...    Putting on and taking off regular lower body clothing?  3  -AR     Bathing (including washing, rinsing, and drying)  3  -AR     Toileting (which includes using toilet bed pan or urinal)  3  -AR     Putting on and taking off regular upper body clothing  3  -AR     Taking care of personal grooming (such as brushing teeth)  3  -AR     Eating meals  3  -AR     AM-PAC 6 Clicks Score (OT)  18  -AR     Row Name 06/11/21 0909          Functional Assessment    Outcome Measure Options  AM-PAC 6 Clicks Daily Activity (OT)  -AR       User Key  (r) = Recorded By, (t) = Taken By, (c) = Cosigned By    Initials Name Provider Type    Maine Ibarra OT Occupational Therapist        Occupational Therapy Education                 Title: PT OT SLP Therapies (Done)     Topic: Occupational Therapy (Done)     Point: ADL training (Done)     Description:   Instruct learner(s) on proper safety adaptation and remediation techniques during self care or transfers.   Instruct in proper use of assistive devices.              Learning Progress Summary           Patient MURALI Cervantes,RUEL,D,H, DU,VU by AR at 6/11/2021 0910   Family MURALI Cervantes TB,D,H, DU,VU by AR at 6/11/2021 0910                   Point: Precautions (Done)     Description:   Instruct learner(s) on prescribed precautions during self-care and functional transfers.              Learning Progress Summary           Patient MURALI Cervantes,RUEL,D,H, DU,VU by AR at 6/11/2021 0910    Family Eager, E,TB,D,H, DU,VU by AR at 6/11/2021 0910                   Point: Body mechanics (Done)     Description:   Instruct learner(s) on proper positioning and spine alignment during self-care, functional mobility activities and/or exercises.              Learning Progress Summary           Patient Eager, E,TB,D,H, DU,VU by AR at 6/11/2021 0910   Family Eager, E,TB,D,H, DU,VU by AR at 6/11/2021 0910                               User Key     Initials Effective Dates Name Provider Type Discipline    AR 06/22/15 -  Maine Wilder OT Occupational Therapist OT              OT Recommendation and Plan  Retired Outcome Summary/Treatment Plan (OT)  Anticipated Discharge Disposition (OT): home with 24/7 care  Planned Therapy Interventions (OT): adaptive equipment training, BADL retraining, functional balance retraining, IADL retraining, occupation/activity based interventions, patient/caregiver education/training, transfer/mobility retraining  Therapy Frequency (OT): evaluation only  Plan of Care Review  Plan of Care Reviewed With: patient, spouse  Outcome Summary: OT educated pt and spouse on ADL retraining and transfer training, issued necessary AE to assist. Pt completed LB dressing with min assist-CGA using AE, toileing with supervision and transfer training with supervision. Recommend DC home with family assist.  Plan of Care Reviewed With: patient, spouse  Outcome Summary: OT educated pt and spouse on ADL retraining and transfer training, issued necessary AE to assist. Pt completed LB dressing with min assist-CGA using AE, toileing with supervision and transfer training with supervision. Recommend DC home with family assist.     Time Calculation:   Time Calculation- OT     Row Name 06/11/21 0910             Time Calculation- OT    OT Start Time  0910  -AR      OT Received On  06/11/21  -AR      OT Goal Re-Cert Due Date  06/11/21  -AR         Timed Charges    92367 - OT Self Care/Mgmt Minutes  11 -AR          Total Minutes    Timed Charges Total Minutes  11  -AR       Total Minutes  11  -AR        User Key  (r) = Recorded By, (t) = Taken By, (c) = Cosigned By    Initials Name Provider Type    Maine Ibarra OT Occupational Therapist        Therapy Charges for Today     Code Description Service Date Service Provider Modifiers Qty    [id number]  OT SELF CARE/MGMT/TRAIN EA 15 MIN 6/11/2021 Maine Wilder OT GO 1    [id number]  OT EVAL LOW COMPLEXITY 4 6/11/2021 Maine Wilder OT GO 1               Maine Wilder OT  6/11/2021

## 2021-07-05 ENCOUNTER — HOSPITAL ENCOUNTER (OUTPATIENT)
Dept: HOSPITAL 22 - PT | Age: 71
Discharge: HOME | End: 2021-07-05
Payer: MEDICARE

## 2021-07-05 DIAGNOSIS — Z96.641: ICD-10-CM

## 2021-07-05 DIAGNOSIS — M16.11: Primary | ICD-10-CM

## 2021-07-05 PROCEDURE — 97163 PT EVAL HIGH COMPLEX 45 MIN: CPT

## 2021-07-05 PROCEDURE — 97110 THERAPEUTIC EXERCISES: CPT

## 2021-07-07 ENCOUNTER — OFFICE VISIT (OUTPATIENT)
Dept: ORTHOPEDIC SURGERY | Facility: CLINIC | Age: 71
End: 2021-07-07

## 2021-07-07 VITALS — TEMPERATURE: 96.9 F

## 2021-07-07 DIAGNOSIS — Z96.641 STATUS POST RIGHT HIP REPLACEMENT: Primary | ICD-10-CM

## 2021-07-07 DIAGNOSIS — Z47.89 ORTHOPEDIC AFTERCARE: ICD-10-CM

## 2021-07-07 PROCEDURE — 99024 POSTOP FOLLOW-UP VISIT: CPT | Performed by: ORTHOPAEDIC SURGERY

## 2021-07-07 RX ORDER — CLOBETASOL PROPIONATE 0.5 MG/G
CREAM TOPICAL DAILY
COMMUNITY
Start: 2021-07-03

## 2021-07-07 RX ORDER — FLUTICASONE PROPIONATE 50 MCG
2 SPRAY, SUSPENSION (ML) NASAL DAILY
COMMUNITY
Start: 2021-05-17

## 2021-07-07 NOTE — PROGRESS NOTES
[facility] [place] [facility] [place]    Subjective     Chief Complaint   Patient presents with   • Post-op     4 weeks status post TOTAL HIP ARTHROPLASTY ANTERIOR MODIFIED RIGHT 6-10-21        HPI    Chelsey Herrera is a 70 y.o. female who follows up for her right total hip arthroplasty performed on 06/10/2021.     She is here today for routine follow-up. Her right hip is 100% better than it was prior to surgery. The pain in her groin has resolved.    I have reviewed the following portions of the patient's history and agree with: History of Present Illness and Review of Systems    Patient Active Problem List   Diagnosis   • Persistent atrial fibrillation (CMS/HCC)   • Hypertension   • Obesity, Class II, BMI 35-39.9   • Fibromyalgia   • Nephrolithiasis   • History of surgery   • Primary osteoarthritis of right hip   • Status post total replacement of right hip   • Chronic anticoagulation   • Elevated hemoglobin A1c   • Postoperative pain   • Anemia     Past Medical History:   Diagnosis Date   • Arthritis    • Back pain    • Bilateral kidney stones    • Chronic fatigue    • Chronic pain    • Dental bridge present    • Fibromyalgia    • Hypertension    • Hypothyroidism    • Insomnia    • Muscle weakness (generalized)    • Palpitations    • Persistent atrial fibrillation (CMS/HCC) 1/23/2017    A) Diagnosed 2016 by PCP when presenting with extreme fatigue B) CHADS-VASc = 3 (age, female, HTN)  C) 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 Changed to flecainide. F) ECV 11/29/16 on flecainide. Recurrence of afib about 24hrs later. F) TTE 10/20/16 bin   • PONV (postoperative nausea and vomiting)    • Sciatica of right side    • SOB (shortness of breath)    • Urination frequency    • Wears eyeglasses     reading      Past Surgical History:   Procedure Laterality Date   • CARDIAC  CATHETERIZATION      NO STENTS   • CARDIAC ELECTROPHYSIOLOGY PROCEDURE N/A 2/22/2017    Procedure: PVA. DNS meds.;  Surgeon: Miguelito Owusu DO;  Location: [facility] INVASIVE LOCATION;  Service:    • CARDIOVERSION      X 2   • CARPAL TUNNEL RELEASE Bilateral    • COLONOSCOPY      2013   • GALLBLADDER SURGERY     • GASTRIC BYPASS     • REPLACEMENT TOTAL KNEE BILATERAL Bilateral    • SINUS SURGERY      X 2   • TOTAL HIP ARTHROPLASTY Right 6/10/2021    Procedure: TOTAL HIP ARTHROPLASTY ANTERIOR MODIFIED RIGHT;  Surgeon: Silas Ravi MD;  Location: [facility] OR;  Service: Orthopedics;  Laterality: Right;   • UTERINE SUSPENSION LAPAROSCOPIC        Family History   Problem Relation Age of Onset   • Heart attack Mother    • Heart attack Father    • Heart disease Brother    • Diabetes Brother    • Kidney disease Brother      Social History     Socioeconomic History   • Marital status:      Spouse name: Not on file   • Number of children: Not on file   • Years of education: Not on file   • Highest education level: Not on file   Tobacco Use   • Smoking status: Never Smoker   • Smokeless tobacco: Never Used   Vaping Use   • Vaping Use: Never used   Substance and Sexual Activity   • Alcohol use: No   • Drug use: No   • Sexual activity: Defer      Current Outpatient Medications on File Prior to Visit   Medication Sig Dispense Refill   • apixaban (ELIQUIS) 5 MG tablet tablet Take 1 tablet by mouth Every 12 (Twelve) Hours. Take a dose of 2.5 mg every 12 hours till 6/16/21 evening dose, then resume 5 mg every 12 hours. 60 tablet    • clobetasol (TEMOVATE) 0.05 % cream Daily. apply externally to the affected area daily     • cyclobenzaprine (FLEXERIL) 10 MG tablet Take 10 mg by mouth At Night As Needed for muscle spasms.     • flecainide (TAMBOCOR) 50 MG tablet TAKE 1 TABLET BY MOUTH EVERY 12 (TWELVE) HOURS. 180 tablet 3   • fluticasone (FLONASE) 50 MCG/ACT nasal spray 2 sprays by Each Nare route Daily.     • furosemide  (LASIX) 40 MG tablet Take 40 mg by mouth Daily As Needed (swelling).     • HYDROcodone-acetaminophen (NORCO) 7.5-325 MG per tablet Take 1 tablet by mouth Every 6 (Six) Hours As Needed for moderate pain (4-6).     • levothyroxine (SYNTHROID, LEVOTHROID) 50 MCG tablet Take 50 mcg by mouth Daily.     • metoprolol tartrate (LOPRESSOR) 50 MG tablet TAKE 1 TABLET BY MOUTH TWICE A  tablet 0   • potassium chloride (K-DUR,KLOR-CON) 20 MEQ CR tablet Take 20 mEq by mouth As Needed (with lasix).       No current facility-administered medications on file prior to visit.      Allergies   Allergen Reactions   • Accupril [Quinapril Hcl] Cough   • Cephalexin Itching   • Ciprofloxacin Itching   • Triamcinolone Other (See Comments)     Burning sensation        Review of Systems   Constitutional: Negative.    HENT: Negative.    Eyes: Negative.    Respiratory: Negative.    Cardiovascular: Negative.    Gastrointestinal: Negative.    Endocrine: Negative.    Genitourinary: Negative.    Musculoskeletal: Positive for arthralgias.   Skin: Negative.    Allergic/Immunologic: Negative.    Neurological: Negative.    Hematological: Negative.    Psychiatric/Behavioral: Negative.         Objective      Physical Exam  Temp 96.9 °F (36.1 °C)   LMP  (LMP Unknown)     There is no height or weight on file to calculate BMI.    General:   Mental Status:  Alert   Appearance: Cooperative, in no acute distress   Build and Nutrition: Obese by BMI female   Orientation: Alert and oriented to person, place and time   Posture: Normal   Gait: Nonantalgic    Integument  • Right hip: Wound is well-healed with no signs of infection    Lower Extremities  • Right Hip:  • Tenderness: None  • Swelling: None  • Crepitus: None  • Range of motion:  • External rotation: 30°  • Internal rotation: 30°  • Flexion: 100°  • Extension: 0°  • Deformities: None  • Functional Testing:  • Stinchfield Test: Negative  • No leg length discrepancy.    Imaging/Studies  Imaging  Results (Last 24 Hours)     Procedure Component Value Units Date/Time    XR Hip With or Without Pelvis 2 - 3 View Right [[id number]] Resulted: 07/07/21 1502     Updated: 07/07/21 1503    Narrative:      Right Hip Radiographs  Indication: status-post right total hip arthroplasty  Views: low AP pelvis and lateral of the right hip    Comparison: no change compared to prior study, 6/10/2021    Findings:   The components are well aligned, with no signs of loosening or failure.            Assessment and Plan     Diagnoses and all orders for this visit:    1. Status post right hip replacement (Primary)  -     XR Hip With or Without Pelvis 2 - 3 View Right    2. Orthopedic aftercare        1. Status post right hip replacement    2. Orthopedic aftercare        I have reviewed my findings with the patient today. She is doing well postoperatively and is very pleased with her right hip. She may transition from [facility] therapy to a home exercise program.  I will see her back in 6 weeks for checkup, no x-rays required on that visit.  I will see her back sooner for any problems.    Return in about 6 weeks (around 8/18/2021).      Scribed for Silas Ravi MD by Sam Woods.  07/07/21   17:47 EDT    I have personally performed the services described in this document as scribed by the above individual, and it is both accurate and complete.  Silas Ravi MD  7/8/2021  17:05 EDT

## 2021-08-18 ENCOUNTER — OFFICE VISIT (OUTPATIENT)
Dept: ORTHOPEDIC SURGERY | Facility: CLINIC | Age: 71
End: 2021-08-18

## 2021-08-18 DIAGNOSIS — Z47.89 ORTHOPEDIC AFTERCARE: ICD-10-CM

## 2021-08-18 DIAGNOSIS — Z96.641 STATUS POST RIGHT HIP REPLACEMENT: Primary | ICD-10-CM

## 2021-08-18 PROCEDURE — 99024 POSTOP FOLLOW-UP VISIT: CPT | Performed by: ORTHOPAEDIC SURGERY

## 2021-08-18 NOTE — PROGRESS NOTES
[facility] [place] Orthopaedic Surgery Clinic Note    Subjective     Chief Complaint   Patient presents with   • Post-op Follow-up     6 week recheck - 10 weeks status post TOTAL HIP ARTHROPLASTY ANTERIOR MODIFIED RIGHT 6-10-21        HPI    Chelsey Herrera is a 70 y.o. female who follows up for her right total hip arthroplasty performed on 06/10/2021.     Her right hip is 100% better compared to before surgery.    I have reviewed the following portions of the patient's history and agree with: History of Present Illness and Review of Systems    Patient Active Problem List   Diagnosis   • Persistent atrial fibrillation (CMS/HCC)   • Hypertension   • Obesity, Class II, BMI 35-39.9   • Fibromyalgia   • Nephrolithiasis   • History of surgery   • Primary osteoarthritis of right hip   • Status post total replacement of right hip   • Chronic anticoagulation   • Elevated hemoglobin A1c   • Postoperative pain   • Anemia     Past Medical History:   Diagnosis Date   • Arthritis    • Back pain    • Bilateral kidney stones    • Chronic fatigue    • Chronic pain    • Dental bridge present    • Fibromyalgia    • Hypertension    • Hypothyroidism    • Insomnia    • Muscle weakness (generalized)    • Palpitations    • Persistent atrial fibrillation (CMS/HCC) 1/23/2017    A) Diagnosed 2016 by PCP when presenting with extreme fatigue B) CHADS-VASc = 3 (age, female, HTN)  C) 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 Changed to flecainide. F) ECV 11/29/16 on flecainide. Recurrence of afib about 24hrs later. F) TTE 10/20/16 bin   • PONV (postoperative nausea and vomiting)    • Sciatica of right side    • SOB (shortness of breath)    • Urination frequency    • Wears eyeglasses     reading      Past Surgical History:   Procedure Laterality Date   • CARDIAC CATHETERIZATION      NO STENTS   • CARDIAC  ELECTROPHYSIOLOGY PROCEDURE N/A 2/22/2017    Procedure: PVA. DNS meds.;  Surgeon: Miguelito Owusu DO;  Location: [facility] EP INVASIVE LOCATION;  Service:    • CARDIOVERSION      X 2   • CARPAL TUNNEL RELEASE Bilateral    • COLONOSCOPY      2013   • GALLBLADDER SURGERY     • GASTRIC BYPASS     • REPLACEMENT TOTAL KNEE BILATERAL Bilateral    • SINUS SURGERY      X 2   • TOTAL HIP ARTHROPLASTY Right 6/10/2021    Procedure: TOTAL HIP ARTHROPLASTY ANTERIOR MODIFIED RIGHT;  Surgeon: Silas Ravi MD;  Location: [facility] OR;  Service: Orthopedics;  Laterality: Right;   • UTERINE SUSPENSION LAPAROSCOPIC        Family History   Problem Relation Age of Onset   • Heart attack Mother    • Heart attack Father    • Heart disease Brother    • Diabetes Brother    • Kidney disease Brother      Social History     Socioeconomic History   • Marital status:      Spouse name: Not on file   • Number of children: Not on file   • Years of education: Not on file   • Highest education level: Not on file   Tobacco Use   • Smoking status: Never Smoker   • Smokeless tobacco: Never Used   Vaping Use   • Vaping Use: Never used   Substance and Sexual Activity   • Alcohol use: No   • Drug use: No   • Sexual activity: Defer      Current Outpatient Medications on File Prior to Visit   Medication Sig Dispense Refill   • apixaban (ELIQUIS) 5 MG tablet tablet Take 1 tablet by mouth Every 12 (Twelve) Hours. Take a dose of 2.5 mg every 12 hours till 6/16/21 evening dose, then resume 5 mg every 12 hours. 60 tablet    • clobetasol (TEMOVATE) 0.05 % cream Daily. apply externally to the affected area daily     • cyclobenzaprine (FLEXERIL) 10 MG tablet Take 10 mg by mouth At Night As Needed for muscle spasms.     • flecainide (TAMBOCOR) 50 MG tablet TAKE 1 TABLET BY MOUTH EVERY 12 (TWELVE) HOURS. 180 tablet 3   • fluticasone (FLONASE) 50 MCG/ACT nasal spray 2 sprays by Each Nare route Daily.     • furosemide (LASIX) 40 MG tablet Take 40 mg by mouth Daily  As Needed (swelling).     • HYDROcodone-acetaminophen (NORCO) 7.5-325 MG per tablet Take 1 tablet by mouth Every 6 (Six) Hours As Needed for moderate pain (4-6).     • levothyroxine (SYNTHROID, LEVOTHROID) 50 MCG tablet Take 50 mcg by mouth Daily.     • metoprolol tartrate (LOPRESSOR) 50 MG tablet TAKE 1 TABLET BY MOUTH TWICE A  tablet 0   • potassium chloride (K-DUR,KLOR-CON) 20 MEQ CR tablet Take 20 mEq by mouth As Needed (with lasix).       No current facility-administered medications on file prior to visit.      Allergies   Allergen Reactions   • Accupril [Quinapril Hcl] Cough   • Cephalexin Itching   • Ciprofloxacin Itching   • Triamcinolone Other (See Comments)     Burning sensation        Review of Systems     Objective      Physical Exam  LMP  (LMP Unknown)     There is no height or weight on file to calculate BMI.    General:   Mental Status:  Alert   Appearance: Cooperative, in no acute distress   Build and Nutrition: Obese by BMI female   Orientation: Alert and oriented to person, place and time   Posture: Normal   Gait: Nonantalgic, normal    Integument  • Right hip: Wound is well-healed with no signs of infection    Lower Extremities  • Right Hip:  • Tenderness: None  • Swelling: None  • Crepitus: None  • Range of motion:  • External rotation: 40° without pain  • Internal rotation: 40° without pain  • Flexion: 100°  • Extension: 0°  • Deformities: None  • Functional Testing:  • Stinchfield Test: Negative  • No leg length discrepancy.         Imaging/Studies  No new radiographs were obtained.       Assessment and Plan     Diagnoses and all orders for this visit:    1. Status post right hip replacement (Primary)    2. Orthopedic aftercare        1. Status post right hip replacement    2. Orthopedic aftercare        I have discussed my findings with the patient today. She is doing well postoperatively, and is pleased with the results.  I will see her back on the anniversary of her replacement, but  sooner for any problems.    Return in about 10 months (around 6/18/2022) for Recheck with X-Rays.      Transcribed from ambient dictation for Silas Ravi MD by Sam Woods.  08/18/21   14:30 EDT    I have personally performed the services described in this document as transcribed by the above individual, and it is both accurate and complete.  Silas Ravi MD  8/19/2021  12:13 EDT

## 2021-08-19 ENCOUNTER — HOSPITAL ENCOUNTER (OUTPATIENT)
Age: 71
End: 2021-08-19
Payer: MEDICARE

## 2021-08-19 DIAGNOSIS — R07.89: Primary | ICD-10-CM

## 2021-08-19 PROCEDURE — 71046 X-RAY EXAM CHEST 2 VIEWS: CPT

## 2021-08-19 PROCEDURE — 71101 X-RAY EXAM UNILAT RIBS/CHEST: CPT

## 2021-10-06 ENCOUNTER — HOSPITAL ENCOUNTER (OUTPATIENT)
Age: 71
End: 2021-10-06
Payer: MEDICARE

## 2021-10-06 DIAGNOSIS — Z12.31: Primary | ICD-10-CM

## 2021-10-06 PROCEDURE — 77063 BREAST TOMOSYNTHESIS BI: CPT

## 2021-10-06 PROCEDURE — 77067 SCR MAMMO BI INCL CAD: CPT

## 2021-11-18 ENCOUNTER — HOSPITAL ENCOUNTER (OUTPATIENT)
Age: 71
End: 2021-11-18
Payer: MEDICARE

## 2021-11-18 DIAGNOSIS — E66.9: ICD-10-CM

## 2021-11-18 DIAGNOSIS — Z98.84: ICD-10-CM

## 2021-11-18 DIAGNOSIS — I48.91: Primary | ICD-10-CM

## 2021-11-18 DIAGNOSIS — E03.9: ICD-10-CM

## 2021-11-18 LAB
25-OH VITAMIN D, TOTAL: 30 NG/ML (ref 30–100)
ALBUMIN LEVEL: 4.4 G/DL (ref 3.5–5)
ALBUMIN/GLOB SERPL: 1.7 {RATIO} (ref 1.1–1.8)
ALP ISO SERPL-ACNC: 113 U/L (ref 38–126)
ALT SERPLBLD-CCNC: 13 U/L (ref 12–78)
ANION GAP SERPL CALC-SCNC: 10.6 MEQ/L (ref 5–15)
AST SERPL QL: 24 U/L (ref 14–36)
BILIRUBIN,TOTAL: 0.5 MG/DL (ref 0.2–1.3)
BUN SERPL-MCNC: 12 MG/DL (ref 7–17)
CALCIUM SPEC-MCNC: 9.4 MG/DL (ref 8.4–10.2)
CHLORIDE SPEC-SCNC: 104 MMOL/L (ref 98–107)
CHOLEST SPEC-SCNC: 199 MG/DL (ref 140–200)
CO2 SERPL-SCNC: 28 MMOL/L (ref 22–30)
CREAT BLD-SCNC: 0.7 MG/DL (ref 0.52–1.04)
ESTIMATED GLOMERULAR FILT RATE: 82 ML/MIN (ref 60–?)
GFR (AFRICAN AMERICAN): 99 ML/MIN (ref 60–?)
GLOBULIN SER CALC-MCNC: 2.6 G/DL (ref 1.3–3.2)
GLUCOSE: 105 MG/DL (ref 74–100)
HCT VFR BLD CALC: 31.3 % (ref 37–47)
HDLC SERPL-MCNC: 92 MG/DL (ref 40–60)
HGB BLD-MCNC: 9.9 G/DL (ref 12.2–16.2)
MCHC RBC-ENTMCNC: 31.6 G/DL (ref 31.8–35.4)
MCV RBC: 77.9 FL (ref 81–99)
MEAN CORPUSCULAR HEMOGLOBIN: 24.6 PG (ref 27–31.2)
PLATELET # BLD: 337 K/MM3 (ref 142–424)
POTASSIUM: 4.6 MMOL/L (ref 3.5–5.1)
PROT SERPL-MCNC: 7 G/DL (ref 6.3–8.2)
RBC # BLD AUTO: 4.02 M/MM3 (ref 4.2–5.4)
SODIUM SPEC-SCNC: 138 MMOL/L (ref 136–145)
TRIGLYCERIDES: 106 MG/DL (ref 30–150)
TSH SERPL-ACNC: 3.48 UIU/ML (ref 0.47–4.68)
VITAMIN B12: 209 PG/ML (ref 239–931)
WBC # BLD AUTO: 7.4 K/MM3 (ref 4.8–10.8)

## 2021-11-18 PROCEDURE — 82607 VITAMIN B-12: CPT

## 2021-11-18 PROCEDURE — 82306 VITAMIN D 25 HYDROXY: CPT

## 2021-11-18 PROCEDURE — 80053 COMPREHEN METABOLIC PANEL: CPT

## 2021-11-18 PROCEDURE — 85025 COMPLETE CBC W/AUTO DIFF WBC: CPT

## 2021-11-18 PROCEDURE — 36415 COLL VENOUS BLD VENIPUNCTURE: CPT

## 2021-11-18 PROCEDURE — 84443 ASSAY THYROID STIM HORMONE: CPT

## 2021-11-18 PROCEDURE — 80061 LIPID PANEL: CPT

## 2022-05-21 ENCOUNTER — HOSPITAL ENCOUNTER (OUTPATIENT)
Age: 72
End: 2022-05-21
Payer: MEDICARE

## 2022-05-21 DIAGNOSIS — Z01.812: Primary | ICD-10-CM

## 2022-05-21 DIAGNOSIS — Z20.822: ICD-10-CM

## 2022-05-21 DIAGNOSIS — D64.9: ICD-10-CM

## 2022-05-21 DIAGNOSIS — Z13.810: ICD-10-CM

## 2022-05-21 PROCEDURE — U0003 INFECTIOUS AGENT DETECTION BY NUCLEIC ACID (DNA OR RNA); SEVERE ACUTE RESPIRATORY SYNDROME CORONAVIRUS 2 (SARS-COV-2) (CORONAVIRUS DISEASE [COVID-19]), AMPLIFIED PROBE TECHNIQUE, MAKING USE OF HIGH THROUGHPUT TECHNOLOGIES AS DESCRIBED BY CMS-2020-01-R: HCPCS

## 2022-05-21 PROCEDURE — U0005 INFEC AGEN DETEC AMPLI PROBE: HCPCS

## 2022-05-21 PROCEDURE — C9803 HOPD COVID-19 SPEC COLLECT: HCPCS

## 2022-05-24 ENCOUNTER — HOSPITAL ENCOUNTER (OUTPATIENT)
Dept: HOSPITAL 22 - OUTP | Age: 72
Discharge: HOME | End: 2022-05-24
Payer: MEDICARE

## 2022-05-24 VITALS
OXYGEN SATURATION: 97 % | RESPIRATION RATE: 16 BRPM | HEART RATE: 56 BPM | DIASTOLIC BLOOD PRESSURE: 83 MMHG | SYSTOLIC BLOOD PRESSURE: 157 MMHG

## 2022-05-24 VITALS
DIASTOLIC BLOOD PRESSURE: 68 MMHG | SYSTOLIC BLOOD PRESSURE: 128 MMHG | RESPIRATION RATE: 16 BRPM | OXYGEN SATURATION: 93 % | HEART RATE: 56 BPM

## 2022-05-24 VITALS
SYSTOLIC BLOOD PRESSURE: 154 MMHG | OXYGEN SATURATION: 95 % | HEART RATE: 51 BPM | RESPIRATION RATE: 16 BRPM | DIASTOLIC BLOOD PRESSURE: 55 MMHG

## 2022-05-24 VITALS
OXYGEN SATURATION: 98 % | SYSTOLIC BLOOD PRESSURE: 147 MMHG | RESPIRATION RATE: 16 BRPM | HEART RATE: 51 BPM | DIASTOLIC BLOOD PRESSURE: 71 MMHG

## 2022-05-24 VITALS
TEMPERATURE: 98.06 F | SYSTOLIC BLOOD PRESSURE: 189 MMHG | HEART RATE: 66 BPM | DIASTOLIC BLOOD PRESSURE: 103 MMHG | OXYGEN SATURATION: 99 % | RESPIRATION RATE: 18 BRPM

## 2022-05-24 VITALS
RESPIRATION RATE: 12 BRPM | OXYGEN SATURATION: 92 % | SYSTOLIC BLOOD PRESSURE: 105 MMHG | HEART RATE: 52 BPM | DIASTOLIC BLOOD PRESSURE: 53 MMHG | TEMPERATURE: 97.4 F

## 2022-05-24 VITALS
RESPIRATION RATE: 16 BRPM | OXYGEN SATURATION: 97 % | HEART RATE: 56 BPM | SYSTOLIC BLOOD PRESSURE: 181 MMHG | DIASTOLIC BLOOD PRESSURE: 84 MMHG

## 2022-05-24 VITALS
RESPIRATION RATE: 16 BRPM | OXYGEN SATURATION: 98 % | SYSTOLIC BLOOD PRESSURE: 159 MMHG | HEART RATE: 55 BPM | DIASTOLIC BLOOD PRESSURE: 79 MMHG | TEMPERATURE: 97.4 F

## 2022-05-24 VITALS
DIASTOLIC BLOOD PRESSURE: 72 MMHG | RESPIRATION RATE: 16 BRPM | HEART RATE: 54 BPM | SYSTOLIC BLOOD PRESSURE: 170 MMHG | OXYGEN SATURATION: 99 %

## 2022-05-24 VITALS
DIASTOLIC BLOOD PRESSURE: 77 MMHG | RESPIRATION RATE: 16 BRPM | OXYGEN SATURATION: 98 % | HEART RATE: 55 BPM | SYSTOLIC BLOOD PRESSURE: 170 MMHG

## 2022-05-24 VITALS — BODY MASS INDEX: 37.4 KG/M2

## 2022-05-24 VITALS — OXYGEN SATURATION: 99 %

## 2022-05-24 DIAGNOSIS — Z82.5: ICD-10-CM

## 2022-05-24 DIAGNOSIS — Z83.3: ICD-10-CM

## 2022-05-24 DIAGNOSIS — K57.30: ICD-10-CM

## 2022-05-24 DIAGNOSIS — Z82.49: ICD-10-CM

## 2022-05-24 DIAGNOSIS — Z80.9: ICD-10-CM

## 2022-05-24 DIAGNOSIS — K64.9: ICD-10-CM

## 2022-05-24 DIAGNOSIS — Z88.8: ICD-10-CM

## 2022-05-24 DIAGNOSIS — D50.9: Primary | ICD-10-CM

## 2022-05-24 DIAGNOSIS — Z82.3: ICD-10-CM

## 2022-05-24 DIAGNOSIS — Z98.84: ICD-10-CM

## 2022-05-24 DIAGNOSIS — Z79.899: ICD-10-CM

## 2022-05-24 DIAGNOSIS — Z83.49: ICD-10-CM

## 2022-05-24 DIAGNOSIS — K56.2: ICD-10-CM

## 2022-05-24 PROCEDURE — 0DJ08ZZ INSPECTION OF UPPER INTESTINAL TRACT, VIA NATURAL OR ARTIFICIAL OPENING ENDOSCOPIC: ICD-10-PCS | Performed by: SURGERY

## 2022-05-24 PROCEDURE — 74021 RADEX ABDOMEN 3+ VIEWS: CPT

## 2022-05-24 PROCEDURE — 43239 EGD BIOPSY SINGLE/MULTIPLE: CPT

## 2022-05-24 PROCEDURE — 88305 TISSUE EXAM BY PATHOLOGIST: CPT

## 2022-05-24 PROCEDURE — 45378 DIAGNOSTIC COLONOSCOPY: CPT

## 2022-06-01 ENCOUNTER — HOSPITAL ENCOUNTER (OUTPATIENT)
Age: 72
End: 2022-06-01
Payer: MEDICARE

## 2022-06-01 DIAGNOSIS — D50.8: Primary | ICD-10-CM

## 2022-06-01 DIAGNOSIS — Z98.84: ICD-10-CM

## 2022-06-01 LAB
HCT VFR BLD CALC: 33.8 % (ref 37–47)
HGB BLD-MCNC: 10.9 G/DL (ref 12.2–16.2)

## 2022-06-01 PROCEDURE — 36415 COLL VENOUS BLD VENIPUNCTURE: CPT

## 2022-06-01 PROCEDURE — 85018 HEMOGLOBIN: CPT

## 2022-06-01 PROCEDURE — 85014 HEMATOCRIT: CPT

## 2022-06-13 ENCOUNTER — HOSPITAL ENCOUNTER (OUTPATIENT)
Age: 72
End: 2022-06-13
Payer: MEDICARE

## 2022-06-13 DIAGNOSIS — D50.8: Primary | ICD-10-CM

## 2022-06-13 DIAGNOSIS — Z98.84: ICD-10-CM

## 2022-06-13 PROCEDURE — 74248 X-RAY SM INT F-THRU STD: CPT

## 2022-06-13 PROCEDURE — 74246 X-RAY XM UPR GI TRC 2CNTRST: CPT

## 2022-06-20 ENCOUNTER — OFFICE VISIT (OUTPATIENT)
Dept: ORTHOPEDIC SURGERY | Facility: CLINIC | Age: 72
End: 2022-06-20

## 2022-06-20 VITALS — HEIGHT: 64 IN | BODY MASS INDEX: 37.26 KG/M2

## 2022-06-20 DIAGNOSIS — Z96.641 STATUS POST RIGHT HIP REPLACEMENT: Primary | ICD-10-CM

## 2022-06-20 DIAGNOSIS — Z09 POSTOPERATIVE EXAMINATION: ICD-10-CM

## 2022-06-20 PROCEDURE — 99212 OFFICE O/P EST SF 10 MIN: CPT | Performed by: ORTHOPAEDIC SURGERY

## 2022-06-20 RX ORDER — CYCLOSPORINE 0.5 MG/ML
EMULSION OPHTHALMIC
COMMUNITY
Start: 2022-06-01

## 2022-06-20 ASSESSMENT — HOOS JR
HOOS JR SCORE: 73.472
HOOS JR SCORE: 5

## 2022-06-20 NOTE — PROGRESS NOTES
[facility] [place] Orthopaedic Surgery Clinic Note    Subjective     Chief Complaint   Patient presents with   • Follow-up     10 month f/u, 1 yr s/p Total Hip Arthroplasty Anterior Modified Right 6/10/21        HPI    It has been 10  month(s) since Ms. Herrera's last visit. She returns to clinic today for follow-up of right hip arthroplasty. The issue has been ongoing for 1 year(s). She rates her pain a 3/10 on the pain scale. Previous/current treatments: nothing. Current symptoms: nothing. The pain is worse with lying on affected side; heat and pain medication and/or NSAID improve the pain. Overall, she is doing better.  100% improvement compared to her preoperative symptoms.  Fully ambulatory without external aids.    I have reviewed the following portions of the patient's history and agree with: History of Present Illness and Review of Systems    Patient Active Problem List   Diagnosis   • Persistent atrial fibrillation (HCC)   • Hypertension   • Obesity, Class II, BMI 35-39.9   • Fibromyalgia   • Nephrolithiasis   • History of surgery   • Primary osteoarthritis of right hip   • Status post total replacement of right hip   • Chronic anticoagulation   • Elevated hemoglobin A1c   • Postoperative pain   • Anemia     Past Medical History:   Diagnosis Date   • Arthritis    • Back pain    • Bilateral kidney stones    • Chronic fatigue    • Chronic pain    • Dental bridge present    • Fibromyalgia    • Hypertension    • Hypothyroidism    • Insomnia    • Muscle weakness (generalized)    • Palpitations    • Persistent atrial fibrillation (HCC) 1/23/2017    A) Diagnosed 2016 by PCP when presenting with extreme fatigue B) CHADS-VASc = 3 (age, female, HTN)  C) 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 Changed to flecainide. F) ECV 11/29/16 on flecainide. Recurrence of afib about 24hrs later.  F) TTE 10/20/16 bin   • PONV (postoperative nausea and vomiting)    • Sciatica of right side    • SOB (shortness of breath)    • Urination frequency    • Wears eyeglasses     reading      Past Surgical History:   Procedure Laterality Date   • CARDIAC CATHETERIZATION      NO STENTS   • CARDIAC ELECTROPHYSIOLOGY PROCEDURE N/A 02/22/2017    Procedure: PVA. DNS meds.;  Surgeon: Miguelito Owusu DO;  Location:  [facility] EP INVASIVE LOCATION;  Service:    • CARDIOVERSION      X 2   • CARPAL TUNNEL RELEASE Bilateral    • COLONOSCOPY      2013   • ENDOSCOPY  05/2022   • GALLBLADDER SURGERY     • GASTRIC BYPASS     • REPLACEMENT TOTAL KNEE BILATERAL Bilateral    • SINUS SURGERY      X 2   • TOTAL HIP ARTHROPLASTY Right 06/10/2021    Procedure: TOTAL HIP ARTHROPLASTY ANTERIOR MODIFIED RIGHT;  Surgeon: Silas Ravi MD;  Location:  [facility] OR;  Service: Orthopedics;  Laterality: Right;   • UTERINE SUSPENSION LAPAROSCOPIC        Family History   Problem Relation Age of Onset   • Heart attack Mother    • Heart attack Father    • Heart disease Brother    • Diabetes Brother    • Kidney disease Brother      Social History     Socioeconomic History   • Marital status:    Tobacco Use   • Smoking status: Never Smoker   • Smokeless tobacco: Never Used   Vaping Use   • Vaping Use: Never used   Substance and Sexual Activity   • Alcohol use: No   • Drug use: No   • Sexual activity: Defer      Current Outpatient Medications on File Prior to Visit   Medication Sig Dispense Refill   • apixaban (ELIQUIS) 5 MG tablet tablet Take 1 tablet by mouth Every 12 (Twelve) Hours. Take a dose of 2.5 mg every 12 hours till 6/16/21 evening dose, then resume 5 mg every 12 hours. 60 tablet    • clobetasol (TEMOVATE) 0.05 % cream Daily. apply externally to the affected area daily     • cyclobenzaprine (FLEXERIL) 10 MG tablet Take 10 mg by mouth At Night As Needed for muscle spasms.     • flecainide (TAMBOCOR) 50 MG tablet TAKE 1 TABLET BY MOUTH EVERY 12  "(TWELVE) HOURS. 180 tablet 3   • fluticasone (FLONASE) 50 MCG/ACT nasal spray 2 sprays by Each Nare route Daily.     • furosemide (LASIX) 40 MG tablet Take 40 mg by mouth Daily As Needed (swelling).     • HYDROcodone-acetaminophen (NORCO) 7.5-325 MG per tablet Take 1 tablet by mouth Every 6 (Six) Hours As Needed for moderate pain (4-6).     • levothyroxine (SYNTHROID, LEVOTHROID) 50 MCG tablet Take 50 mcg by mouth Daily.     • metoprolol tartrate (LOPRESSOR) 50 MG tablet TAKE 1 TABLET BY MOUTH TWICE A  tablet 0   • potassium chloride (K-DUR,KLOR-CON) 20 MEQ CR tablet Take 20 mEq by mouth As Needed (with lasix).     • Diclofenac Sodium (VOLTAREN) 1 % gel gel APPLY TOPICALLY TO THE AFFECTED AREA FOUR TIMES DAILY AS DIRECTED     • Restasis 0.05 % ophthalmic emulsion INSTILL ONE DROP INTO BOTH EYES TWICE DAILY FOR ONGOING DRY EYES KERATITIS       No current facility-administered medications on file prior to visit.      Allergies   Allergen Reactions   • Accupril [Quinapril Hcl] Cough   • Cephalexin Itching   • Ciprofloxacin Itching   • Triamcinolone Other (See Comments)     Burning sensation        Review of Systems   Constitutional: Positive for fatigue.   HENT: Negative.    Eyes: Negative.    Respiratory: Negative.    Cardiovascular: Negative.    Gastrointestinal: Negative.    Endocrine: Negative.    Genitourinary: Negative.    Musculoskeletal: Positive for arthralgias, back pain and neck pain.   Skin: Negative.    Allergic/Immunologic: Positive for environmental allergies.   Neurological: Negative.    Hematological: Negative.    Psychiatric/Behavioral: Positive for sleep disturbance.        Objective      Physical Exam  Ht 162.6 cm (64.02\")   LMP  (LMP Unknown)   BMI 37.26 kg/m²     Body mass index is 37.26 kg/m².    General:   Mental Status:  Alert   Appearance: Cooperative, in no acute distress   Build and Nutrition: Obese by BMI female   Orientation: Alert and oriented to person, place and time   Posture: " Normal   Gait: Normal    Integument:   Right hip: Wound is well-healed with no signs of infection    Lower Extremity:   Right Hip:    Tenderness:  None    Swelling:  None    Crepitus:  None    Range of motion:  External Rotation: 30°       Internal Rotation: 30°       Flexion:  100°       Extension:  0°    Deformities:  None  Functional testing: Negative Stinchfield    No leg length discrepancy      Imaging/Studies  Imaging Results (Last 24 Hours)     Procedure Component Value Units Date/Time    XR Hip With or Without Pelvis 2 - 3 View Right [[id number]] Resulted: 06/20/22 1411     Updated: 06/20/22 1411    Narrative:      Right Hip Radiographs  Indication: status-post right total hip arthroplasty  Views: low AP pelvis and lateral of the right hip    Comparison: no change compared to prior study, 7/7/2021    Findings:   The components are well aligned, with no signs of loosening or failure.            Assessment and Plan     Diagnoses and all orders for this visit:    1. Status post right hip replacement (Primary)  -     XR Hip With or Without Pelvis 2 - 3 View Right    2. Postoperative examination        1. Status post right hip replacement    2. Postoperative examination        I reviewed my findings with the patient.  Her right total hip arthroplasty is functioning well, and she is pleased with results.  I will see her back in 4 years with a repeat x-ray, but sooner for any problems.    Return in about 4 years (around 6/20/2026) for Recheck with X-Rays.      Silas Ravi MD  06/20/22  14:26 EDT

## 2023-11-29 ENCOUNTER — HOSPITAL ENCOUNTER (OUTPATIENT)
Age: 73
End: 2023-11-29
Payer: MEDICARE

## 2023-11-29 DIAGNOSIS — Z12.31: Primary | ICD-10-CM

## 2023-11-29 PROCEDURE — 77063 BREAST TOMOSYNTHESIS BI: CPT

## 2023-11-29 PROCEDURE — 77067 SCR MAMMO BI INCL CAD: CPT

## 2024-06-05 ENCOUNTER — OFFICE VISIT (OUTPATIENT)
Dept: ORTHOPEDIC SURGERY | Facility: CLINIC | Age: 74
End: 2024-06-05
Payer: MEDICARE

## 2024-06-05 VITALS
WEIGHT: 194 LBS | BODY MASS INDEX: 34.38 KG/M2 | SYSTOLIC BLOOD PRESSURE: 132 MMHG | DIASTOLIC BLOOD PRESSURE: 74 MMHG | HEIGHT: 63 IN

## 2024-06-05 DIAGNOSIS — M16.12 PRIMARY OSTEOARTHRITIS OF LEFT HIP: Primary | ICD-10-CM

## 2024-06-05 PROCEDURE — 3078F DIAST BP <80 MM HG: CPT | Performed by: ORTHOPAEDIC SURGERY

## 2024-06-05 PROCEDURE — 1160F RVW MEDS BY RX/DR IN RCRD: CPT | Performed by: ORTHOPAEDIC SURGERY

## 2024-06-05 PROCEDURE — 1159F MED LIST DOCD IN RCRD: CPT | Performed by: ORTHOPAEDIC SURGERY

## 2024-06-05 PROCEDURE — 99214 OFFICE O/P EST MOD 30 MIN: CPT | Performed by: ORTHOPAEDIC SURGERY

## 2024-06-05 PROCEDURE — 3075F SYST BP GE 130 - 139MM HG: CPT | Performed by: ORTHOPAEDIC SURGERY

## 2024-06-05 RX ORDER — LIFITEGRAST 50 MG/ML
SOLUTION/ DROPS OPHTHALMIC
COMMUNITY
Start: 2024-05-05

## 2024-06-05 RX ORDER — TRAZODONE HYDROCHLORIDE 100 MG/1
100 TABLET ORAL DAILY
COMMUNITY
Start: 2024-03-19

## 2024-06-05 RX ORDER — DULOXETIN HYDROCHLORIDE 30 MG/1
1 CAPSULE, DELAYED RELEASE ORAL DAILY
COMMUNITY
Start: 2024-05-13

## 2024-06-05 NOTE — PROGRESS NOTES
[facility] [place] Orthopaedic Surgery Clinic Note    Subjective     Chief Complaint   Patient presents with    Left Hip - Pain        HPI    Chelsey Herrera is a 73 y.o. female who presents with new problem of: left hip pain.  Onset: atraumatic and gradual in nature. The issue has been ongoing for 3 month(s). Pain is a 5/10 on the pain scale. Pain is described as stabbing. Associated symptoms include pain and stiffness. The pain is worse with walking, climbing stairs, rising from seated position, and any movement of the joint; sitting and pain medication and/or NSAID improve the pain. Previous treatments have included: nothing.  Pain is located in the groin region.  No previous injections.  She did well following right total hip arthroplasty surgery a few years ago.    I have reviewed the following portions of the patient's history and agree with: History of Present Illness and Review of Systems    Patient Active Problem List   Diagnosis    Persistent atrial fibrillation    Hypertension    Obesity, Class II, BMI 35-39.9    Fibromyalgia    Nephrolithiasis    History of surgery    Primary osteoarthritis of right hip    Status post total replacement of right hip    Chronic anticoagulation    Elevated hemoglobin A1c    Postoperative pain    Anemia     Past Medical History:   Diagnosis Date    Arthritis     Back pain     Bilateral kidney stones     Chronic fatigue     Chronic pain     Dental bridge present     Fibromyalgia     Hypertension     Hypothyroidism     Insomnia     Muscle weakness (generalized)     Palpitations     Persistent atrial fibrillation 1/23/2017    A) Diagnosed 2016 by PCP when presenting with extreme fatigue B) CHADS-VASc = 3 (age, female, HTN)  C) 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 Changed to flecainide. F) ECV 11/29/16 on flecainide. Recurrence of afib  about 24hrs later. F) TTE 10/20/16 bin    PONV (postoperative nausea and vomiting)     Sciatica of right side     SOB (shortness of breath)     Urination frequency     Wears eyeglasses     reading      Past Surgical History:   Procedure Laterality Date    CARDIAC CATHETERIZATION      NO STENTS    CARDIAC ELECTROPHYSIOLOGY PROCEDURE N/A 02/22/2017    Procedure: PVA. DNS meds.;  Surgeon: Miguelito Owusu DO;  Location:  [facility] EP INVASIVE LOCATION;  Service:     CARDIOVERSION      X 2    CARPAL TUNNEL RELEASE Bilateral     COLONOSCOPY      2013    ENDOSCOPY  05/2022    GALLBLADDER SURGERY      GASTRIC BYPASS      REPLACEMENT TOTAL KNEE BILATERAL Bilateral     SINUS SURGERY      X 2    TOTAL HIP ARTHROPLASTY Right 06/10/2021    Procedure: TOTAL HIP ARTHROPLASTY ANTERIOR MODIFIED RIGHT;  Surgeon: Silas Ravi MD;  Location:  [facility] OR;  Service: Orthopedics;  Laterality: Right;    UTERINE SUSPENSION LAPAROSCOPIC        Family History   Problem Relation Age of Onset    Heart attack Mother     Heart attack Father     Heart disease Brother     Diabetes Brother     Kidney disease Brother      Social History     Socioeconomic History    Marital status:    Tobacco Use    Smoking status: Never    Smokeless tobacco: Never   Vaping Use    Vaping status: Never Used   Substance and Sexual Activity    Alcohol use: No    Drug use: No    Sexual activity: Defer      Current Outpatient Medications on File Prior to Visit   Medication Sig Dispense Refill    apixaban (ELIQUIS) 5 MG tablet tablet Take 1 tablet by mouth Every 12 (Twelve) Hours. Take a dose of 2.5 mg every 12 hours till 6/16/21 evening dose, then resume 5 mg every 12 hours. 60 tablet     cyclobenzaprine (FLEXERIL) 10 MG tablet Take 1 tablet by mouth At Night As Needed for Muscle Spasms.      Diclofenac Sodium (VOLTAREN) 1 % gel gel APPLY TOPICALLY TO THE AFFECTED AREA FOUR TIMES DAILY AS DIRECTED      DULoxetine (CYMBALTA) 30 MG capsule Take 1 capsule by mouth Daily.       flecainide (TAMBOCOR) 50 MG tablet TAKE 1 TABLET BY MOUTH EVERY 12 (TWELVE) HOURS. 180 tablet 3    HYDROcodone-acetaminophen (NORCO) 7.5-325 MG per tablet Take 1 tablet by mouth Every 6 (Six) Hours As Needed for Moderate Pain.      levothyroxine (SYNTHROID, LEVOTHROID) 50 MCG tablet Take 1 tablet by mouth Daily.      metoprolol tartrate (LOPRESSOR) 50 MG tablet TAKE 1 TABLET BY MOUTH TWICE A  tablet 0    traZODone (DESYREL) 100 MG tablet Take 1 tablet by mouth Daily.      Xiidra 5 % ophthalmic solution       fluticasone (FLONASE) 50 MCG/ACT nasal spray 2 sprays by Each Nare route Daily. (Patient not taking: Reported on 6/5/2024)      furosemide (LASIX) 40 MG tablet Take 40 mg by mouth Daily As Needed (swelling). (Patient not taking: Reported on 6/5/2024)      potassium chloride (K-DUR,KLOR-CON) 20 MEQ CR tablet Take 20 mEq by mouth As Needed (with lasix). (Patient not taking: Reported on 6/5/2024)      [DISCONTINUED] clobetasol (TEMOVATE) 0.05 % cream Daily. apply externally to the affected area daily      [DISCONTINUED] Restasis 0.05 % ophthalmic emulsion INSTILL ONE DROP INTO BOTH EYES TWICE DAILY FOR ONGOING DRY EYES KERATITIS       No current facility-administered medications on file prior to visit.      Allergies   Allergen Reactions    Accupril [Quinapril Hcl] Cough    Cephalexin Itching    Ciprofloxacin Itching    Triamcinolone Other (See Comments)     Burning sensation        Review of Systems   Constitutional:  Negative for activity change, appetite change, chills, diaphoresis, fatigue, fever and unexpected weight change.   HENT:  Negative for congestion, dental problem, drooling, ear discharge, ear pain, facial swelling, hearing loss, mouth sores, nosebleeds, postnasal drip, rhinorrhea, sinus pressure, sneezing, sore throat, tinnitus, trouble swallowing and voice change.    Eyes:  Negative for photophobia, pain, discharge, redness, itching and visual disturbance.   Respiratory:  Negative for  "apnea, cough, choking, chest tightness, shortness of breath, wheezing and stridor.    Cardiovascular:  Negative for chest pain, palpitations and leg swelling.   Gastrointestinal:  Negative for abdominal distention, abdominal pain, anal bleeding, blood in stool, constipation, diarrhea, nausea, rectal pain and vomiting.   Endocrine: Negative for cold intolerance, heat intolerance, polydipsia, polyphagia and polyuria.   Genitourinary:  Negative for decreased urine volume, difficulty urinating, dysuria, enuresis, flank pain, frequency, genital sores, hematuria and urgency.   Musculoskeletal:  Positive for arthralgias. Negative for back pain, gait problem, joint swelling, myalgias, neck pain and neck stiffness.   Skin:  Negative for color change, pallor, rash and wound.   Allergic/Immunologic: Negative for environmental allergies, food allergies and immunocompromised state.   Neurological:  Negative for dizziness, tremors, seizures, syncope, facial asymmetry, speech difficulty, weakness, light-headedness, numbness and headaches.   Hematological:  Negative for adenopathy. Does not bruise/bleed easily.   Psychiatric/Behavioral:  Negative for agitation, behavioral problems, confusion, decreased concentration, dysphoric mood, hallucinations, self-injury, sleep disturbance and suicidal ideas. The patient is not nervous/anxious and is not hyperactive.         Objective      Physical Exam  /74   Ht 160 cm (63\")   Wt 88 kg (194 lb)   LMP  (LMP Unknown)   BMI 34.37 kg/m²     Body mass index is 34.37 kg/m².  BMI cannot be calculated due to outdated height or weight values.  Please input a current height/weight in Vitals and re-renter [id number] in Note to pull in correct documentation based on BMI range.        General:   Mental Status:  Alert   Appearance: Cooperative, in no acute distress   Build and Nutrition: Obese by BMI female   Orientation: Alert and oriented to person, place and time   Posture: Normal   Gait: " Mild limp on the left    Integument:   Left hip: No skin lesions, no rash, no ecchymosis    Neurologic:   Motor:  Left lower extremity: 5/5 quadriceps, hamstrings, ankle dorsiflexors, and ankle plantar flexors    Lower Extremity:   Left Hip:    Tenderness:  None    Swelling:  None    Crepitus:  None    Atrophy:  None    Range of motion:  External Rotation: 30°       Internal Rotation: 20°, with pain       Flexion:  100°       Extension:  0°   Instability:  None  Deformities:  None  Functional testing: Positive Stinchfield    No leg length discrepancy      Imaging/Studies      Imaging Results (Last 24 Hours)       Procedure Component Value Units Date/Time    XR Hip With or Without Pelvis 2 - 3 View Left [[id number]] Resulted: 06/05/24 0835     Updated: 06/05/24 0836    Narrative:      Left Hip Radiographs  Indication: left hip pain  Views: low AP pelvis and lateral of the left hip    Comparison: AP view only, 7/7/2021    Findings:   Bone-on-bone contact medially, acetabular and femoral sided osteophytes,   with no acute bony abnormalities.  No unusual bony features.  Mild   worsening compared to the previous imaging.  Hip arthritis.              Assessment and Plan     Diagnoses and all orders for this visit:    1. Primary osteoarthritis of left hip (Primary)  -     XR Hip With or Without Pelvis 2 - 3 View Left        1. Primary osteoarthritis of left hip        I reviewed my findings with the patient.  We discussed options for her hip today, and she is interested in a trial of an intra-articular hip injection.  I will refer her to Dr. Armas for consideration of an ultrasound-guided injection.  She is not yet interested in surgical intervention, and prefers additional conservative treatment before proceeding.  Ultimately she is a likely candidate.  Of note, she may be moving to [place] in a few months.  I will see her back in 3 months, but sooner for any problems.    Return in about 3 months (around  9/5/2024).      Silas Ravi MD  06/05/24  08:47 EDT    Dictated Utilizing Dragon Dictation

## 2024-06-06 ENCOUNTER — OFFICE VISIT (OUTPATIENT)
Age: 74
End: 2024-06-06
Payer: MEDICARE

## 2024-06-06 DIAGNOSIS — M16.12 PRIMARY OSTEOARTHRITIS OF LEFT HIP: Primary | ICD-10-CM

## 2024-06-06 RX ORDER — LIDOCAINE HYDROCHLORIDE 10 MG/ML
2 INJECTION, SOLUTION EPIDURAL; INFILTRATION; INTRACAUDAL; PERINEURAL
Status: COMPLETED | OUTPATIENT
Start: 2024-06-06 | End: 2024-06-06

## 2024-06-06 RX ORDER — TRIAMCINOLONE ACETONIDE 40 MG/ML
80 INJECTION, SUSPENSION INTRA-ARTICULAR; INTRAMUSCULAR
Status: COMPLETED | OUTPATIENT
Start: 2024-06-06 | End: 2024-06-06

## 2024-06-06 RX ORDER — BUPIVACAINE HYDROCHLORIDE 5 MG/ML
2 INJECTION, SOLUTION EPIDURAL; INTRACAUDAL
Status: COMPLETED | OUTPATIENT
Start: 2024-06-06 | End: 2024-06-06

## 2024-06-06 RX ADMIN — TRIAMCINOLONE ACETONIDE 80 MG: 40 INJECTION, SUSPENSION INTRA-ARTICULAR; INTRAMUSCULAR at 13:06

## 2024-06-06 RX ADMIN — BUPIVACAINE HYDROCHLORIDE 2 ML: 5 INJECTION, SOLUTION EPIDURAL; INTRACAUDAL at 13:06

## 2024-06-06 RX ADMIN — LIDOCAINE HYDROCHLORIDE 2 ML: 10 INJECTION, SOLUTION EPIDURAL; INFILTRATION; INTRACAUDAL; PERINEURAL at 13:06

## 2024-06-06 NOTE — PROGRESS NOTES
Procedure   - Large Joint Arthrocentesis: L hip joint on 6/6/2024 1:06 PM  Indications: pain  Details: 22 G needle, anterior approach  Medications: 2 mL lidocaine PF 1% 1 %; 2 mL bupivacaine (PF) 0.5 %; 80 mg triamcinolone acetonide 40 MG/ML  Outcome: tolerated well, no immediate complications  Procedure, treatment alternatives, risks and benefits explained, specific risks discussed. Consent was given by the patient. Immediately prior to procedure a time out was called to verify the correct patient, procedure, equipment, support staff and site/side marked as required. Patient was prepped and draped in the usual sterile fashion.        73-year-old female presents with left hip pain from left hip osteoarthritis.  Patient is a referral from Dr. Ravi for ultrasound-guided left hip joint corticosteroid injection.  Previous office documentation and images were personally reviewed prior to the visit.  We discussed them in detail how they correlate to experienced symptoms.  I explained the procedure in detail.  I answered all questions to the best my ability.  Risks and benefits as well as post procedure instructions were provided.  Patient elected to proceed and tolerated this procedure well.  See procedure note.  Follow-up with me will be on an as-needed basis.

## 2024-08-22 ENCOUNTER — HOSPITAL ENCOUNTER (OUTPATIENT)
Dept: HOSPITAL 22 - RAD | Age: 74
Discharge: HOME | End: 2024-08-22
Payer: MEDICARE

## 2024-08-22 DIAGNOSIS — G44.89: Primary | ICD-10-CM

## 2024-08-22 DIAGNOSIS — M79.10: ICD-10-CM

## 2024-08-22 PROCEDURE — 70551 MRI BRAIN STEM W/O DYE: CPT

## 2024-08-22 NOTE — MR_ITS
FINAL REPORT 
 
CLINICAL HISTORY: 
HEADACHE 
 
COMPARISON: 
None 
 
FINDINGS: 
Multi planar MR imaging was obtained through the brain without 
contrast.  The midline structures appear intact.  There is no 
evidence of Chiari malformation.  Mild age-appropriate atrophy 
is noted.  On T2 and flair axial images there are scattered 
small foci of increased signal in the deep white matter 
consistent with mild changes of ischemic/gliotic microvascular 
disease.  On diffusion-weighted images there is no evidence of 
restricted diffusion.  The visualized paranasal sinuses 
demonstrate mild right maxillary mucoperiosteal thickening.  The 
seventh and eighth nerve root complexes are intact. 
 
IMPRESSION: 
Mild age-appropriate atrophy and mild changes of chronic 
ischemic/gliotic microvascular disease. 
 
Reviewed, Interpreted and Dictated by Vikram Rubio MD 
Transcribed by Lata Cedeno 
 
Authenticated and Electronically Signed by Vikram Rubio MD 
on 08/22/2024 09:06:44 AM [facility]

## 2024-09-09 ENCOUNTER — OFFICE VISIT (OUTPATIENT)
Dept: ORTHOPEDIC SURGERY | Facility: CLINIC | Age: 74
End: 2024-09-09
Payer: MEDICARE

## 2024-09-09 VITALS
HEIGHT: 63 IN | BODY MASS INDEX: 32.39 KG/M2 | WEIGHT: 182.8 LBS | DIASTOLIC BLOOD PRESSURE: 76 MMHG | SYSTOLIC BLOOD PRESSURE: 130 MMHG

## 2024-09-09 DIAGNOSIS — M16.12 PRIMARY OSTEOARTHRITIS OF LEFT HIP: Primary | ICD-10-CM

## 2024-09-09 PROCEDURE — 3078F DIAST BP <80 MM HG: CPT | Performed by: ORTHOPAEDIC SURGERY

## 2024-09-09 PROCEDURE — 99212 OFFICE O/P EST SF 10 MIN: CPT | Performed by: ORTHOPAEDIC SURGERY

## 2024-09-09 PROCEDURE — 3075F SYST BP GE 130 - 139MM HG: CPT | Performed by: ORTHOPAEDIC SURGERY

## 2024-09-09 PROCEDURE — 1160F RVW MEDS BY RX/DR IN RCRD: CPT | Performed by: ORTHOPAEDIC SURGERY

## 2024-09-09 PROCEDURE — 1159F MED LIST DOCD IN RCRD: CPT | Performed by: ORTHOPAEDIC SURGERY

## 2024-09-09 NOTE — PROGRESS NOTES
[facility] Note    Subjective     Chief Complaint   Patient presents with    Follow-up     3 month follow up -- Primary osteoarthritis of left hip        HPI    It has been 3  month(s) since Ms. Herrera's last visit. She returns to clinic today for follow-up of left hip pain. The issue has been ongoing for 2 year(s). She rates her pain a 5/10 on the pain scale. Previous/current treatments: NSAIDS and physical therapy. Current symptoms: pain. The pain is worse with sitting, climbing stairs, and rising from seated position; heat and pain medication and/or NSAID improve the pain. Overall, she is doing better.  Injection helped on her last visit.      I have reviewed the following portions of the patient's history and agree with: History of Present Illness and Review of Systems    Patient Active Problem List   Diagnosis    Persistent atrial fibrillation    Hypertension    Obesity, Class II, BMI 35-39.9    Fibromyalgia    Nephrolithiasis    History of surgery    Primary osteoarthritis of right hip    Status post total replacement of right hip    Chronic anticoagulation    Elevated hemoglobin A1c    Postoperative pain    Anemia     Past Medical History:   Diagnosis Date    Arthritis     Back pain     Bilateral kidney stones     Chronic fatigue     Chronic pain     Dental bridge present     Fibromyalgia     Hypertension     Hypothyroidism     Insomnia     Muscle weakness (generalized)     Palpitations     Persistent atrial fibrillation 1/23/2017    A) Diagnosed 2016 by PCP when presenting with extreme fatigue B) CHADS-VASc = 3 (age, female, HTN)  C) 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 Changed to flecainide. F) ECV 11/29/16 on flecainide. Recurrence of afib about 24hrs later. F) TTE 10/20/16 bin    PONV (postoperative nausea and vomiting)     Sciatica  of right side     SOB (shortness of breath)     Urination frequency     Wears eyeglasses     reading      Past Surgical History:   Procedure Laterality Date    CARDIAC CATHETERIZATION      NO STENTS    CARDIAC ELECTROPHYSIOLOGY PROCEDURE N/A 02/22/2017    Procedure: PVA. DNS meds.;  Surgeon: Miguelito Owusu DO;  Location:  [facility];  Service:     CARDIOVERSION      X 2    CARPAL TUNNEL RELEASE Bilateral     COLONOSCOPY      2013    ENDOSCOPY  05/2022    GALLBLADDER SURGERY      GASTRIC BYPASS      REPLACEMENT TOTAL KNEE BILATERAL Bilateral     SINUS SURGERY      X 2    TOTAL HIP ARTHROPLASTY Right 06/10/2021    Procedure: TOTAL HIP ARTHROPLASTY ANTERIOR MODIFIED RIGHT;  Surgeon: Silas Rvai MD;  Location:  [facility];  Service: Orthopedics;  Laterality: Right;    UTERINE SUSPENSION LAPAROSCOPIC        Family History   Problem Relation Age of Onset    Heart attack Mother     Heart attack Father     Heart disease Brother     Diabetes Brother     Kidney disease Brother      Social History     Socioeconomic History    Marital status:    Tobacco Use    Smoking status: Never    Smokeless tobacco: Never   Vaping Use    Vaping status: Never Used   Substance and Sexual Activity    Alcohol use: No    Drug use: No    Sexual activity: Defer      Current Outpatient Medications on File Prior to Visit   Medication Sig Dispense Refill    apixaban (ELIQUIS) 5 MG tablet tablet Take 1 tablet by mouth Every 12 (Twelve) Hours. Take a dose of 2.5 mg every 12 hours till 6/16/21 evening dose, then resume 5 mg every 12 hours. 60 tablet     cyclobenzaprine (FLEXERIL) 10 MG tablet Take 1 tablet by mouth At Night As Needed for Muscle Spasms.      Diclofenac Sodium (VOLTAREN) 1 % gel gel APPLY TOPICALLY TO THE AFFECTED AREA FOUR TIMES DAILY AS DIRECTED      DULoxetine (CYMBALTA) 30 MG capsule Take 1 capsule by mouth Daily.      flecainide (TAMBOCOR) 50 MG tablet TAKE 1 TABLET BY MOUTH EVERY 12 (TWELVE) HOURS. 180  "tablet 3    fluticasone (FLONASE) 50 MCG/ACT nasal spray 2 sprays by Each Nare route Daily.      furosemide (LASIX) 40 MG tablet Take 1 tablet by mouth Daily As Needed (swelling).      HYDROcodone-acetaminophen (NORCO) 7.5-325 MG per tablet Take 1 tablet by mouth Every 6 (Six) Hours As Needed for Moderate Pain.      levothyroxine (SYNTHROID, LEVOTHROID) 50 MCG tablet Take 1 tablet by mouth Daily.      metoprolol tartrate (LOPRESSOR) 50 MG tablet TAKE 1 TABLET BY MOUTH TWICE A  tablet 0    potassium chloride (K-DUR,KLOR-CON) 20 MEQ CR tablet Take 1 tablet by mouth As Needed (with lasix).      traZODone (DESYREL) 100 MG tablet Take 1 tablet by mouth Daily.      Xiidra 5 % ophthalmic solution        No current facility-administered medications on file prior to visit.      Allergies   Allergen Reactions    Accupril [Quinapril Hcl] Cough    Cephalexin Itching    Ciprofloxacin Itching    Triamcinolone Other (See Comments)     Burning sensation        Review of Systems     Objective      Physical Exam  /76   Ht 160 cm (63\")   Wt 82.9 kg (182 lb 12.8 oz)   LMP  (LMP Unknown)   BMI 32.38 kg/m²     Body mass index is 32.38 kg/m².  BMI is >= 30 and <35. (Class 1 Obesity). The following options were offered after discussion;: referral to primary care      General:   Mental Status:  Alert   Appearance: Cooperative, in no acute distress   Build and Nutrition: Obese by BMI female   Orientation: Alert and oriented to person, place and time   Posture: Normal   Gait: Nonantalgic    Lower Extremity:              Left Hip:                          Tenderness:    None                          Swelling:          None                          Crepitus:          None                          Atrophy:           None                          Range of motion:        External Rotation:       20°                                                              Internal Rotation:        20°, with pain                                 "                              Flexion:                       100°                                                              Extension:                   0°           Instability:        None  Deformities:     None  Functional testing: Negative [facility]                          No leg length discrepancy    Imaging/Studies  Imaging Results (Last 24 Hours)       ** No results found for the last 24 hours. **          No new imaging today.    Assessment and Plan     Diagnoses and all orders for this visit:    1. Primary osteoarthritis of left hip (Primary)        1. Primary osteoarthritis of left hip          I reviewed my findings with the patient.  Her left hip responded well to the intra-articular hip injection and she still having relief.  I will see her back in 6 months, but I will be happy to see her back sooner for any problems.  Of note, she may be moving to [place] in the near future if she can sell her house.    Return in about 6 months (around 3/9/2025).      Silas Ravi MD  09/09/24  14:47 EDT    Dictated Utilizing Dragon Dictation

## 2024-12-13 ENCOUNTER — APPOINTMENT (OUTPATIENT)
Dept: GENERAL RADIOLOGY | Facility: HOSPITAL | Age: 74
DRG: 305 | End: 2024-12-13
Payer: MEDICARE

## 2024-12-13 ENCOUNTER — HOSPITAL ENCOUNTER (INPATIENT)
Facility: HOSPITAL | Age: 74
LOS: 2 days | Discharge: HOME | DRG: 305 | End: 2024-12-15
Attending: EMERGENCY MEDICINE | Admitting: STUDENT IN AN ORGANIZED HEALTH CARE EDUCATION/TRAINING PROGRAM
Payer: MEDICARE

## 2024-12-13 ENCOUNTER — APPOINTMENT (OUTPATIENT)
Dept: CT IMAGING | Facility: HOSPITAL | Age: 74
DRG: 305 | End: 2024-12-13
Payer: MEDICARE

## 2024-12-13 DIAGNOSIS — R51.9 ACUTE NONINTRACTABLE HEADACHE, UNSPECIFIED HEADACHE TYPE: Primary | ICD-10-CM

## 2024-12-13 DIAGNOSIS — R47.9 DIFFICULTY WITH SPEECH: ICD-10-CM

## 2024-12-13 DIAGNOSIS — I16.1 HYPERTENSIVE EMERGENCY: ICD-10-CM

## 2024-12-13 DIAGNOSIS — R41.841 COGNITIVE COMMUNICATION DEFICIT: ICD-10-CM

## 2024-12-13 LAB
ALT SERPL W P-5'-P-CCNC: 6 U/L (ref 1–33)
APTT PPP: 33.8 SECONDS (ref 22–39)
AST SERPL-CCNC: 15 U/L (ref 1–32)
BASOPHILS # BLD AUTO: 0.09 10*3/MM3 (ref 0–0.2)
BASOPHILS NFR BLD AUTO: 0.8 % (ref 0–1.5)
BILIRUB UR QL STRIP: NEGATIVE
BUN BLDA-MCNC: 13 MG/DL
CALCIUM BLD QL: 1.08 MG/DL
CHLORIDE BLDA-SCNC: 97 MMOL/L (ref 98–109)
CLARITY UR: CLEAR
COLOR UR: YELLOW
CREAT BLDA-MCNC: 0.7 MG/DL (ref 0.6–1.3)
DEPRECATED RDW RBC AUTO: 43.8 FL (ref 37–54)
EOSINOPHIL # BLD AUTO: 0.1 10*3/MM3 (ref 0–0.4)
EOSINOPHIL NFR BLD AUTO: 0.8 % (ref 0.3–6.2)
ERYTHROCYTE [DISTWIDTH] IN BLOOD BY AUTOMATED COUNT: 13.2 % (ref 12.3–15.4)
GLUCOSE BLDC GLUCOMTR-MCNC: 137 MG/DL (ref 70–130)
GLUCOSE BLDC GLUCOMTR-MCNC: 177 MG/DL (ref 70–130)
GLUCOSE UR STRIP-MCNC: NEGATIVE MG/DL
HCT VFR BLD AUTO: 35.8 % (ref 34–46.6)
HCT VFR BLDA CALC: 36 % (ref 38–51)
HGB BLD-MCNC: 11.7 G/DL (ref 12–15.9)
HGB BLDA-MCNC: 12.2 G/DL (ref 12–17)
HGB UR QL STRIP.AUTO: NEGATIVE
HOLD SPECIMEN: NORMAL
IMM GRANULOCYTES # BLD AUTO: 0.04 10*3/MM3 (ref 0–0.05)
IMM GRANULOCYTES NFR BLD AUTO: 0.3 % (ref 0–0.5)
INR PPP: 1.06 (ref 0.89–1.12)
KETONES UR QL STRIP: NEGATIVE
LEUKOCYTE ESTERASE UR QL STRIP.AUTO: NEGATIVE
LYMPHOCYTES # BLD AUTO: 1.26 10*3/MM3 (ref 0.7–3.1)
LYMPHOCYTES NFR BLD AUTO: 10.5 % (ref 19.6–45.3)
MCH RBC QN AUTO: 29.4 PG (ref 26.6–33)
MCHC RBC AUTO-ENTMCNC: 32.7 G/DL (ref 31.5–35.7)
MCV RBC AUTO: 89.9 FL (ref 79–97)
MONOCYTES # BLD AUTO: 0.76 10*3/MM3 (ref 0.1–0.9)
MONOCYTES NFR BLD AUTO: 6.3 % (ref 5–12)
NEUTROPHILS NFR BLD AUTO: 81.3 % (ref 42.7–76)
NEUTROPHILS NFR BLD AUTO: 9.74 10*3/MM3 (ref 1.7–7)
NITRITE UR QL STRIP: NEGATIVE
NRBC BLD AUTO-RTO: 0 /100 WBC (ref 0–0.2)
PH UR STRIP.AUTO: 7.5 [PH] (ref 5–8)
PLATELET # BLD AUTO: 250 10*3/MM3 (ref 140–450)
PMV BLD AUTO: 9.7 FL (ref 6–12)
POTASSIUM BLDA-SCNC: 3.9 MMOL/L (ref 3.5–4.9)
PROT UR QL STRIP: NEGATIVE
PROTHROMBIN TIME: 13.9 SECONDS (ref 12.2–14.5)
RBC # BLD AUTO: 3.98 10*6/MM3 (ref 3.77–5.28)
SODIUM BLD-SCNC: 138 MMOL/L (ref 138–146)
SP GR UR STRIP: 1.02 (ref 1–1.03)
UROBILINOGEN UR QL STRIP: NORMAL
WBC NRBC COR # BLD AUTO: 11.99 10*3/MM3 (ref 3.4–10.8)
WHOLE BLOOD HOLD COAG: NORMAL
WHOLE BLOOD HOLD SPECIMEN: NORMAL

## 2024-12-13 PROCEDURE — 84460 ALANINE AMINO (ALT) (SGPT): CPT | Performed by: EMERGENCY MEDICINE

## 2024-12-13 PROCEDURE — 80047 BASIC METABLC PNL IONIZED CA: CPT

## 2024-12-13 PROCEDURE — 99222 1ST HOSP IP/OBS MODERATE 55: CPT | Performed by: INTERNAL MEDICINE

## 2024-12-13 PROCEDURE — 84450 TRANSFERASE (AST) (SGOT): CPT | Performed by: EMERGENCY MEDICINE

## 2024-12-13 PROCEDURE — 70450 CT HEAD/BRAIN W/O DYE: CPT

## 2024-12-13 PROCEDURE — 85610 PROTHROMBIN TIME: CPT | Performed by: EMERGENCY MEDICINE

## 2024-12-13 PROCEDURE — 99285 EMERGENCY DEPT VISIT HI MDM: CPT

## 2024-12-13 PROCEDURE — 99222 1ST HOSP IP/OBS MODERATE 55: CPT

## 2024-12-13 PROCEDURE — 36415 COLL VENOUS BLD VENIPUNCTURE: CPT

## 2024-12-13 PROCEDURE — 85014 HEMATOCRIT: CPT

## 2024-12-13 PROCEDURE — 85730 THROMBOPLASTIN TIME PARTIAL: CPT | Performed by: EMERGENCY MEDICINE

## 2024-12-13 PROCEDURE — 93005 ELECTROCARDIOGRAM TRACING: CPT | Performed by: EMERGENCY MEDICINE

## 2024-12-13 PROCEDURE — 25510000001 IOPAMIDOL PER 1 ML: Performed by: EMERGENCY MEDICINE

## 2024-12-13 PROCEDURE — 70498 CT ANGIOGRAPHY NECK: CPT

## 2024-12-13 PROCEDURE — 85025 COMPLETE CBC W/AUTO DIFF WBC: CPT | Performed by: EMERGENCY MEDICINE

## 2024-12-13 PROCEDURE — 70496 CT ANGIOGRAPHY HEAD: CPT

## 2024-12-13 PROCEDURE — 0042T HC CT CEREBRAL PERFUSION W/WO CONTRAST: CPT

## 2024-12-13 PROCEDURE — 82948 REAGENT STRIP/BLOOD GLUCOSE: CPT

## 2024-12-13 PROCEDURE — 71045 X-RAY EXAM CHEST 1 VIEW: CPT

## 2024-12-13 PROCEDURE — 25010000002 MAGNESIUM SULFATE IN D5W 1G/100ML (PREMIX) 1-5 GM/100ML-% SOLUTION

## 2024-12-13 PROCEDURE — 81003 URINALYSIS AUTO W/O SCOPE: CPT | Performed by: EMERGENCY MEDICINE

## 2024-12-13 RX ORDER — ATORVASTATIN CALCIUM 40 MG/1
80 TABLET, FILM COATED ORAL NIGHTLY
Status: DISCONTINUED | OUTPATIENT
Start: 2024-12-14 | End: 2024-12-15 | Stop reason: HOSPADM

## 2024-12-13 RX ORDER — FLECAINIDE ACETATE 50 MG/1
50 TABLET ORAL EVERY 12 HOURS SCHEDULED
Status: DISCONTINUED | OUTPATIENT
Start: 2024-12-14 | End: 2024-12-15 | Stop reason: HOSPADM

## 2024-12-13 RX ORDER — HYDROCODONE BITARTRATE AND ACETAMINOPHEN 7.5; 325 MG/1; MG/1
1 TABLET ORAL EVERY 6 HOURS PRN
Status: DISCONTINUED | OUTPATIENT
Start: 2024-12-13 | End: 2024-12-15 | Stop reason: HOSPADM

## 2024-12-13 RX ORDER — ACETAMINOPHEN 160 MG/5ML
650 SOLUTION ORAL EVERY 4 HOURS PRN
Status: DISCONTINUED | OUTPATIENT
Start: 2024-12-13 | End: 2024-12-15 | Stop reason: HOSPADM

## 2024-12-13 RX ORDER — SODIUM CHLORIDE 0.9 % (FLUSH) 0.9 %
10 SYRINGE (ML) INJECTION AS NEEDED
Status: DISCONTINUED | OUTPATIENT
Start: 2024-12-13 | End: 2024-12-15 | Stop reason: HOSPADM

## 2024-12-13 RX ORDER — ACETAMINOPHEN 650 MG/1
650 SUPPOSITORY RECTAL EVERY 4 HOURS PRN
Status: DISCONTINUED | OUTPATIENT
Start: 2024-12-13 | End: 2024-12-15 | Stop reason: HOSPADM

## 2024-12-13 RX ORDER — IOPAMIDOL 755 MG/ML
115 INJECTION, SOLUTION INTRAVASCULAR
Status: COMPLETED | OUTPATIENT
Start: 2024-12-13 | End: 2024-12-13

## 2024-12-13 RX ORDER — ONDANSETRON 2 MG/ML
4 INJECTION INTRAMUSCULAR; INTRAVENOUS EVERY 6 HOURS PRN
Status: DISCONTINUED | OUTPATIENT
Start: 2024-12-13 | End: 2024-12-15 | Stop reason: HOSPADM

## 2024-12-13 RX ORDER — METOPROLOL TARTRATE 50 MG
50 TABLET ORAL 2 TIMES DAILY
Status: DISCONTINUED | OUTPATIENT
Start: 2024-12-14 | End: 2024-12-14

## 2024-12-13 RX ORDER — FLUTICASONE PROPIONATE 50 MCG
2 SPRAY, SUSPENSION (ML) NASAL DAILY
Status: DISCONTINUED | OUTPATIENT
Start: 2024-12-14 | End: 2024-12-15 | Stop reason: HOSPADM

## 2024-12-13 RX ORDER — MAGNESIUM SULFATE 1 G/100ML
1 INJECTION INTRAVENOUS ONCE
Status: COMPLETED | OUTPATIENT
Start: 2024-12-13 | End: 2024-12-13

## 2024-12-13 RX ORDER — SODIUM CHLORIDE 9 MG/ML
40 INJECTION, SOLUTION INTRAVENOUS AS NEEDED
Status: DISCONTINUED | OUTPATIENT
Start: 2024-12-13 | End: 2024-12-15 | Stop reason: HOSPADM

## 2024-12-13 RX ORDER — ACETAMINOPHEN 325 MG/1
650 TABLET ORAL ONCE
Status: COMPLETED | OUTPATIENT
Start: 2024-12-13 | End: 2024-12-13

## 2024-12-13 RX ORDER — AMOXICILLIN 250 MG
2 CAPSULE ORAL 2 TIMES DAILY PRN
Status: DISCONTINUED | OUTPATIENT
Start: 2024-12-13 | End: 2024-12-15 | Stop reason: HOSPADM

## 2024-12-13 RX ORDER — TRAZODONE HYDROCHLORIDE 100 MG/1
100 TABLET ORAL DAILY
Status: DISCONTINUED | OUTPATIENT
Start: 2024-12-14 | End: 2024-12-15 | Stop reason: HOSPADM

## 2024-12-13 RX ORDER — DULOXETIN HYDROCHLORIDE 30 MG/1
30 CAPSULE, DELAYED RELEASE ORAL DAILY
Status: DISCONTINUED | OUTPATIENT
Start: 2024-12-14 | End: 2024-12-15 | Stop reason: HOSPADM

## 2024-12-13 RX ORDER — SODIUM CHLORIDE 0.9 % (FLUSH) 0.9 %
10 SYRINGE (ML) INJECTION EVERY 12 HOURS SCHEDULED
Status: DISCONTINUED | OUTPATIENT
Start: 2024-12-14 | End: 2024-12-15 | Stop reason: HOSPADM

## 2024-12-13 RX ORDER — ACETAMINOPHEN 325 MG/1
650 TABLET ORAL EVERY 4 HOURS PRN
Status: DISCONTINUED | OUTPATIENT
Start: 2024-12-13 | End: 2024-12-15 | Stop reason: HOSPADM

## 2024-12-13 RX ORDER — BISACODYL 10 MG
10 SUPPOSITORY, RECTAL RECTAL DAILY PRN
Status: DISCONTINUED | OUTPATIENT
Start: 2024-12-13 | End: 2024-12-15 | Stop reason: HOSPADM

## 2024-12-13 RX ORDER — POLYETHYLENE GLYCOL 3350 17 G/17G
17 POWDER, FOR SOLUTION ORAL DAILY PRN
Status: DISCONTINUED | OUTPATIENT
Start: 2024-12-13 | End: 2024-12-15 | Stop reason: HOSPADM

## 2024-12-13 RX ORDER — FUROSEMIDE 40 MG/1
40 TABLET ORAL DAILY PRN
Status: DISCONTINUED | OUTPATIENT
Start: 2024-12-13 | End: 2024-12-15 | Stop reason: HOSPADM

## 2024-12-13 RX ORDER — ONDANSETRON 4 MG/1
4 TABLET, ORALLY DISINTEGRATING ORAL EVERY 6 HOURS PRN
Status: DISCONTINUED | OUTPATIENT
Start: 2024-12-13 | End: 2024-12-15 | Stop reason: HOSPADM

## 2024-12-13 RX ORDER — LEVOTHYROXINE SODIUM 50 UG/1
50 TABLET ORAL DAILY
Status: DISCONTINUED | OUTPATIENT
Start: 2024-12-14 | End: 2024-12-15 | Stop reason: HOSPADM

## 2024-12-13 RX ORDER — BISACODYL 5 MG/1
5 TABLET, DELAYED RELEASE ORAL DAILY PRN
Status: DISCONTINUED | OUTPATIENT
Start: 2024-12-13 | End: 2024-12-15 | Stop reason: HOSPADM

## 2024-12-13 RX ADMIN — MAGNESIUM SULFATE HEPTAHYDRATE 1 G: 10 INJECTION, SOLUTION INTRAVENOUS at 22:41

## 2024-12-13 RX ADMIN — ACETAMINOPHEN 650 MG: 325 TABLET ORAL at 22:45

## 2024-12-13 RX ADMIN — IOPAMIDOL 115 ML: 755 INJECTION, SOLUTION INTRAVENOUS at 21:27

## 2024-12-13 NOTE — Clinical Note
Level of Care: Telemetry [5]   Diagnosis: Hypertensive emergency [[id number]]   Bed Request Comments: stroke r/o   Certification: I Certify That Inpatient Hospital Services Are Medically Necessary For Greater Than 2 Midnights

## 2024-12-14 ENCOUNTER — APPOINTMENT (OUTPATIENT)
Dept: MRI IMAGING | Facility: HOSPITAL | Age: 74
DRG: 305 | End: 2024-12-14
Payer: MEDICARE

## 2024-12-14 ENCOUNTER — APPOINTMENT (OUTPATIENT)
Dept: CARDIOLOGY | Facility: HOSPITAL | Age: 74
DRG: 305 | End: 2024-12-14
Payer: MEDICARE

## 2024-12-14 LAB
BUN BLDA-MCNC: 13 MG/DL (ref 8–26)
CA-I BLDA-SCNC: 1.08 MMOL/L (ref 1.2–1.32)
CHLORIDE BLDA-SCNC: 97 MMOL/L (ref 98–109)
CHOLEST SERPL-MCNC: 176 MG/DL (ref 0–200)
CO2 BLDA-SCNC: 29 MMOL/L (ref 24–29)
CREAT BLDA-MCNC: 0.7 MG/DL (ref 0.6–1.3)
EGFRCR SERPLBLD CKD-EPI 2021: 89.8 ML/MIN/1.73
GLUCOSE BLDC GLUCOMTR-MCNC: 134 MG/DL (ref 70–130)
GLUCOSE BLDC GLUCOMTR-MCNC: 137 MG/DL (ref 70–130)
GLUCOSE BLDC GLUCOMTR-MCNC: 159 MG/DL (ref 70–130)
GLUCOSE BLDC GLUCOMTR-MCNC: 171 MG/DL (ref 70–130)
GLUCOSE BLDC GLUCOMTR-MCNC: 173 MG/DL (ref 70–130)
HBA1C MFR BLD: 6 % (ref 4.8–5.6)
HCT VFR BLDA CALC: 36 % (ref 38–51)
HDLC SERPL-MCNC: 72 MG/DL (ref 40–60)
HGB BLDA-MCNC: 12.2 G/DL (ref 12–17)
LDLC SERPL CALC-MCNC: 89 MG/DL (ref 0–100)
LDLC/HDLC SERPL: 1.22 {RATIO}
MAGNESIUM SERPL-MCNC: 2.4 MG/DL (ref 1.6–2.4)
POTASSIUM BLDA-SCNC: 3.9 MMOL/L (ref 3.5–4.9)
SODIUM BLD-SCNC: 138 MMOL/L (ref 138–146)
TRIGL SERPL-MCNC: 82 MG/DL (ref 0–150)
VLDLC SERPL-MCNC: 15 MG/DL (ref 5–40)

## 2024-12-14 PROCEDURE — 97161 PT EVAL LOW COMPLEX 20 MIN: CPT

## 2024-12-14 PROCEDURE — 92523 SPEECH SOUND LANG COMPREHEN: CPT

## 2024-12-14 PROCEDURE — 99232 SBSQ HOSP IP/OBS MODERATE 35: CPT | Performed by: STUDENT IN AN ORGANIZED HEALTH CARE EDUCATION/TRAINING PROGRAM

## 2024-12-14 PROCEDURE — 93005 ELECTROCARDIOGRAM TRACING: CPT | Performed by: STUDENT IN AN ORGANIZED HEALTH CARE EDUCATION/TRAINING PROGRAM

## 2024-12-14 PROCEDURE — 97165 OT EVAL LOW COMPLEX 30 MIN: CPT

## 2024-12-14 PROCEDURE — 99233 SBSQ HOSP IP/OBS HIGH 50: CPT | Performed by: STUDENT IN AN ORGANIZED HEALTH CARE EDUCATION/TRAINING PROGRAM

## 2024-12-14 PROCEDURE — 25810000003 SODIUM CHLORIDE 0.9 % SOLUTION 250 ML FLEX CONT: Performed by: STUDENT IN AN ORGANIZED HEALTH CARE EDUCATION/TRAINING PROGRAM

## 2024-12-14 PROCEDURE — 83735 ASSAY OF MAGNESIUM: CPT | Performed by: STUDENT IN AN ORGANIZED HEALTH CARE EDUCATION/TRAINING PROGRAM

## 2024-12-14 PROCEDURE — 80061 LIPID PANEL: CPT

## 2024-12-14 PROCEDURE — 83036 HEMOGLOBIN GLYCOSYLATED A1C: CPT

## 2024-12-14 PROCEDURE — 82948 REAGENT STRIP/BLOOD GLUCOSE: CPT

## 2024-12-14 PROCEDURE — 93010 ELECTROCARDIOGRAM REPORT: CPT | Performed by: INTERNAL MEDICINE

## 2024-12-14 PROCEDURE — 25010000002 NICARDIPINE 2.5 MG/ML SOLUTION 10 ML VIAL: Performed by: STUDENT IN AN ORGANIZED HEALTH CARE EDUCATION/TRAINING PROGRAM

## 2024-12-14 PROCEDURE — 70551 MRI BRAIN STEM W/O DYE: CPT

## 2024-12-14 PROCEDURE — 94799 UNLISTED PULMONARY SVC/PX: CPT

## 2024-12-14 RX ORDER — LOSARTAN POTASSIUM 25 MG/1
25 TABLET ORAL
Status: DISCONTINUED | OUTPATIENT
Start: 2024-12-14 | End: 2024-12-15 | Stop reason: HOSPADM

## 2024-12-14 RX ORDER — METOPROLOL TARTRATE 50 MG
50 TABLET ORAL 2 TIMES DAILY
Status: DISCONTINUED | OUTPATIENT
Start: 2024-12-14 | End: 2024-12-15 | Stop reason: HOSPADM

## 2024-12-14 RX ADMIN — HYDROCODONE BITARTRATE AND ACETAMINOPHEN 1 TABLET: 7.5; 325 TABLET ORAL at 05:18

## 2024-12-14 RX ADMIN — Medication 10 ML: at 22:24

## 2024-12-14 RX ADMIN — SODIUM CHLORIDE 5 MG/HR: 9 INJECTION, SOLUTION INTRAVENOUS at 11:11

## 2024-12-14 RX ADMIN — FLECAINIDE ACETATE 50 MG: 50 TABLET ORAL at 00:30

## 2024-12-14 RX ADMIN — Medication 10 ML: at 08:53

## 2024-12-14 RX ADMIN — FLECAINIDE ACETATE 50 MG: 50 TABLET ORAL at 22:24

## 2024-12-14 RX ADMIN — METOPROLOL TARTRATE 50 MG: 50 TABLET, FILM COATED ORAL at 14:41

## 2024-12-14 RX ADMIN — ACETAMINOPHEN 650 MG: 325 TABLET ORAL at 09:22

## 2024-12-14 RX ADMIN — APIXABAN 5 MG: 5 TABLET, FILM COATED ORAL at 08:52

## 2024-12-14 RX ADMIN — HYDROCODONE BITARTRATE AND ACETAMINOPHEN 1 TABLET: 7.5; 325 TABLET ORAL at 11:23

## 2024-12-14 RX ADMIN — APIXABAN 5 MG: 5 TABLET, FILM COATED ORAL at 22:24

## 2024-12-14 RX ADMIN — LEVOTHYROXINE SODIUM 50 MCG: 0.05 TABLET ORAL at 05:18

## 2024-12-14 RX ADMIN — FLECAINIDE ACETATE 50 MG: 50 TABLET ORAL at 09:22

## 2024-12-14 RX ADMIN — ATORVASTATIN CALCIUM 80 MG: 40 TABLET, FILM COATED ORAL at 22:23

## 2024-12-14 RX ADMIN — APIXABAN 5 MG: 5 TABLET, FILM COATED ORAL at 00:30

## 2024-12-14 RX ADMIN — ATORVASTATIN CALCIUM 80 MG: 40 TABLET, FILM COATED ORAL at 00:30

## 2024-12-14 RX ADMIN — TRAZODONE HYDROCHLORIDE 100 MG: 100 TABLET ORAL at 00:30

## 2024-12-14 RX ADMIN — HYDROCODONE BITARTRATE AND ACETAMINOPHEN 1 TABLET: 7.5; 325 TABLET ORAL at 22:23

## 2024-12-14 RX ADMIN — Medication 10 ML: at 22:25

## 2024-12-14 RX ADMIN — DULOXETINE HYDROCHLORIDE 30 MG: 30 CAPSULE, DELAYED RELEASE ORAL at 08:52

## 2024-12-14 RX ADMIN — TRAZODONE HYDROCHLORIDE 100 MG: 100 TABLET ORAL at 22:23

## 2024-12-14 RX ADMIN — LOSARTAN POTASSIUM 25 MG: 25 TABLET, FILM COATED ORAL at 18:12

## 2024-12-14 RX ADMIN — METOPROLOL TARTRATE 50 MG: 50 TABLET, FILM COATED ORAL at 22:23

## 2024-12-14 RX ADMIN — FLUTICASONE PROPIONATE 2 SPRAY: 50 SPRAY, METERED NASAL at 08:52

## 2024-12-14 RX ADMIN — Medication 10 ML: at 00:31

## 2024-12-14 NOTE — PLAN OF CARE
Goal Outcome Evaluation:  Plan of Care Reviewed With: patient        Progress: no change  Outcome Evaluation: Pt presents close to her functional baseline with all ADLs and mobility at this time, but appears to be having mild word finding difficulties with mild balance deficits. Rec home with assist at dc.    Anticipated Discharge Disposition (OT): home with assist

## 2024-12-14 NOTE — CONSULTS
Stroke Consult Note    Patient Name: Chelsey Herrera      MRN: [id number]  Age: 74 y.o.     Sex: female     : 1950  Primary Care Physician: Everardo Mccormack MD  Referring Physician: Dr. Serna, [facility] ED  Handedness: Right  Race:   Time Stroke Team Called:         Time Patient Seen:   Chief Complaint/Reason for Consultation: Headache    HPI  Last Known Normal Date/Time: 1800 on 2024    This patient is a 74-year-old female with past medical history significant for atrial fibrillation (on Eliquis), hypertension, and hypothyroidism who presented to [facility] ED from home via scene flight with complaint of the worst headache of her life.  Per EMS crew, patient was hypertensive with SBP to the 210s during transport.  No neurologic deficits noted per EMS crew.  On my exam, patient is NIH 1 for mild expressive aphasia.  Patient has difficulty identifying objects, however this exam finding is complicated by the patient stating she is having difficulty seeing without her glasses.  Code stroke CTh with no evidence of acute intracranial abnormality.  CT perfusion with no evidence of core infarct or significant territorial ischemic tissue at risk.  CTA H/N with no evidence of LVO, aneurysm, or significant flow-limiting stenosis.  Patient will be admitted to the hospital medicine service for ongoing management and further workup.    Review of Systems   Constitutional:  Negative for chills and fatigue.   HENT:  Negative for trouble swallowing.    Eyes:  Negative for visual disturbance.   Respiratory:  Negative for shortness of breath.    Cardiovascular:  Negative for chest pain.   Gastrointestinal:  Negative for abdominal pain.   Musculoskeletal:  Negative for myalgias.   Neurological:  Positive for headaches. Negative for tremors, facial asymmetry, speech difficulty, weakness, light-headedness and numbness.   Psychiatric/Behavioral:  Negative for behavioral problems. The patient is not nervous/anxious.        Objective  Neurological Exam  Mental Status  Alert. Oriented to person, place, time and situation. Speech is normal. Expressive aphasia present. Attention and concentration are normal. Fund of knowledge is appropriate for level of education.    Cranial Nerves  CN II: Visual fields full to confrontation.  CN III, IV, VI: Extraocular movements intact bilaterally. Pupils equal round and reactive to light bilaterally.  CN V:  Right: Facial sensation is normal.  Left: Facial sensation is normal on the left.  CN VII:  Right: There is no facial weakness.  Left: There is no facial weakness.  CN VIII: Hearing grossly intact bilaterally.  CN IX, X: Palate elevates symmetrically  CN XII: Tongue midline without atrophy or fasciculations.    Motor  Normal muscle bulk throughout. Normal muscle tone. Strength is 5/5 throughout all four extremities.    Sensory  Light touch is normal in upper and lower extremities.     Coordination  Right: Finger-to-nose normal. Rapid alternating movement normal.Left: Finger-to-nose normal. Rapid alternating movement normal.    Gait  Casual gait is normal including stance, stride, and arm swing.    Physical Exam  Constitutional:       General: She is not in acute distress.  HENT:      Head: Normocephalic and atraumatic.      Mouth/Throat:      Mouth: Mucous membranes are moist.   Eyes:      Extraocular Movements: Extraocular movements intact.      Pupils: Pupils are equal, round, and reactive to light.   Cardiovascular:      Rate and Rhythm: Normal rate and regular rhythm.   Pulmonary:      Effort: Pulmonary effort is normal.   Musculoskeletal:         General: No swelling or deformity.   Skin:     General: Skin is warm and dry.   Neurological:      Mental Status: She is alert.      Motor: Motor strength is normal.  Psychiatric:         Mood and Affect: Mood normal.         Speech: Speech normal.         Behavior: Behavior normal.     Temp:  [99.8 °F (37.7 °C)] 99.8 °F (37.7 °C)  Heart Rate:   [70] 70  Resp:  [18] 18  BP: (198)/(137) 198/137    Past Medical History:   Diagnosis Date    Arthritis     Back pain     Bilateral kidney stones     Chronic fatigue     Chronic pain     Dental bridge present     Fibromyalgia     Hypertension     Hypothyroidism     Insomnia     Muscle weakness (generalized)     Palpitations     Persistent atrial fibrillation 1/23/2017    A) Diagnosed 2016 by PCP when presenting with extreme fatigue B) CHADS-VASc = 3 (age, female, HTN)  C) 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 Changed to flecainide. F) ECV 11/29/16 on flecainide. Recurrence of afib about 24hrs later. F) TTE 10/20/16 bin    PONV (postoperative nausea and vomiting)     Sciatica of right side     SOB (shortness of breath)     Urination frequency     Wears eyeglasses     reading     Past Surgical History:   Procedure Laterality Date    CARDIAC CATHETERIZATION      NO STENTS    CARDIAC ELECTROPHYSIOLOGY PROCEDURE N/A 02/22/2017    Procedure: PVA. DNS meds.;  Surgeon: Miguelito Owusu DO;  Location:  [facility];  Service:     CARDIOVERSION      X 2    CARPAL TUNNEL RELEASE Bilateral     COLONOSCOPY      2013    ENDOSCOPY  05/2022    GALLBLADDER SURGERY      GASTRIC BYPASS      REPLACEMENT TOTAL KNEE BILATERAL Bilateral     SINUS SURGERY      X 2    TOTAL HIP ARTHROPLASTY Right 06/10/2021    Procedure: TOTAL HIP ARTHROPLASTY ANTERIOR MODIFIED RIGHT;  Surgeon: Silas Ravi MD;  Location: [facility] OR;  Service: Orthopedics;  Laterality: Right;    UTERINE SUSPENSION LAPAROSCOPIC       Family History   Problem Relation Age of Onset    Heart attack Mother     Heart attack Father     Heart disease Brother     Diabetes Brother     Kidney disease Brother      Social History     Socioeconomic History    Marital status:    Tobacco Use    Smoking status: Never     Smokeless tobacco: Never   Vaping Use    Vaping status: Never Used   Substance and Sexual Activity    Alcohol use: No    Drug use: No    Sexual activity: Defer     Allergies   Allergen Reactions    Accupril [Quinapril Hcl] Cough    Cephalexin Itching    Ciprofloxacin Itching    Triamcinolone Other (See Comments)     Burning sensation     Prior to Admission medications    Medication Sig Start Date End Date Taking? Authorizing Provider   apixaban (ELIQUIS) 5 MG tablet tablet Take 1 tablet by mouth Every 12 (Twelve) Hours. Take a dose of 2.5 mg every 12 hours till 6/16/21 evening dose, then resume 5 mg every 12 hours. 6/17/21   Yanely Singh MD   cyclobenzaprine (FLEXERIL) 10 MG tablet Take 1 tablet by mouth At Night As Needed for Muscle Spasms.    Radha Gooden MD   Diclofenac Sodium (VOLTAREN) 1 % gel gel APPLY TOPICALLY TO THE AFFECTED AREA FOUR TIMES DAILY AS DIRECTED 5/19/22   Radha Gooden MD   DULoxetine (CYMBALTA) 30 MG capsule Take 1 capsule by mouth Daily. 5/13/24   Radha Gooden MD   flecainide (TAMBOCOR) 50 MG tablet TAKE 1 TABLET BY MOUTH EVERY 12 (TWELVE) HOURS. 10/10/18   Miguelito Owusu,    fluticasone (FLONASE) 50 MCG/ACT nasal spray 2 sprays by Each Nare route Daily. 5/17/21   Radha Gooden MD   furosemide (LASIX) 40 MG tablet Take 1 tablet by mouth Daily As Needed (swelling).    Radha Gooden MD   HYDROcodone-acetaminophen (NORCO) 7.5-325 MG per tablet Take 1 tablet by mouth Every 6 (Six) Hours As Needed for Moderate Pain.    Radha Gooden MD   levothyroxine (SYNTHROID, LEVOTHROID) 50 MCG tablet Take 1 tablet by mouth Daily. 5/24/20   Radha Gooden MD   metoprolol tartrate (LOPRESSOR) 50 MG tablet TAKE 1 TABLET BY MOUTH TWICE A DAY 7/19/19   Declan Figueroa MD   potassium chloride (K-DUR,KLOR-CON) 20 MEQ CR tablet Take 1 tablet by mouth As Needed (with lasix).    Radha Gooden MD   traZODone (DESYREL) 100 MG tablet Take 1  tablet by mouth Daily. 3/19/24   ProviderRadha MD   Xiidra 5 % ophthalmic solution  5/5/24   ProviderRadha MD     Acute Stroke Data  Alteplase (tPA) Inclusion / Exclusion Criteria  Person Administering Scale: Clint Morales PA-C    Inclusion Criteria  [x]   18 years of age or greater   []   Onset of symptoms < 4.5 hours before beginning treatment (stroke onset = time patient was last seen well or without symptoms).   []   Diagnosis of acute ischemic stroke causing measurable disabling deficit (Complete Hemianopia, Any Aphasia, Visual or Sensory Extinction, Any weakness limiting sustained effort against gravity)   []   Any remaining deficit considered potentially disabling in view of patient and practitioner   Exclusion criteria (Do not proceed with Alteplase if any are checked under exclusion criteria)  []   Onset unknown or GREATER than 4.5 hours   []   ICH on CT/MRI   []   CT demonstrates hypodensity representing acute or subacute infarct   []   Significant head trauma or prior stroke in the previous 3 months   []   Symptoms suggestive of subarachnoid hemorrhage   []   History of un-ruptured intracranial aneurysm GREATER than 10 mm   []   Recent intracranial or intraspinal surgery within the last 3 months   []   Arterial puncture at a non-compressible site in the previous 7 days   []   Active internal bleeding   []   Acute bleeding tendency   []   Platelet count LESS than 100,000 for known hematological diseases such as leukemia, thrombocytopenia or chronic cirrhosis   []   Current use of anticoagulant with INR GREATER than 1.7 or PT GREATER than 15 seconds, aPTT GREATER than 40 seconds   []   Heparin received within 48 hours, resulting in abnormally elevated aPTT GREATER than upper limit of normal   [x]   Current use of direct thrombin inhibitors or direct factor Xa inhibitors in the past 48 hours   []   Elevated blood pressure refractory to treatment (systolic GREATER than 185 mm/Hg or  diastolic  GREATER than 110 mm/Hg   []   Suspected infective endocarditis and aortic arch dissection   []   Current use of therapeutic treatment dose of low-molecular-weight heparin (LMWH) within the previous 24 hours   []   Structural GI malignancy or bleed   Relative exclusion for all patients  []   Only minor non-disabling symptoms   []   Pregnancy   []   Seizure at onset with postictal residual neurological impairments   []   Major surgery or previous trauma within past 14 days   []   History of previous spontaneous ICH, intracranial neoplasm, or AV malformation   []   Postpartum (within previous 14 days)   []   Recent GI or urinary tract hemorrhage (within previous 21 days)   []   Recent acute MI (within previous 3 months)   []   History of un-ruptured intracranial aneurysm LESS than 10 mm   []   History of ruptured intracranial aneurysm   []   Blood glucose LESS than 50 mg/dL (2.7 mmol/L)   []   Dural puncture within the last 7 days   []   Known GREATER than 10 cerebral microbleeds   Additional exclusions for patients with symptoms onset between 3 and 4.5 hours.  []   Age > 80.   []   On any anticoagulants regardless of INR  >>> Warfarin (Coumadin), Heparin, Enoxaparin (Lovenox), fondaparinux (Arixtra), bivalirudin (Angiomax), Argatroban, dabigatran (Pradaxa), rivaroxaban (Xarelto), or apixaban (Eliquis)   []   Severe stroke (NIHSS > 25).   []   History of BOTH diabetes and previous ischemic stroke.   []   The risks and benefits have been discussed with the patient or family related to the administration of IV Alteplase for stroke symptoms.   []   I have discussed and reviewed the patient's case and imaging with the attending prior to IV Alteplase.   N/A Time Alteplase administered     MODIFIED BLANCA SCALE (to be assessed for each patient having history of stroke) []Stroke history but not assessed  [x]0: No symptoms at all  []1: No significant disability despite symptoms  []2: Slight disability  []3: Moderate  disability  []4: Moderately severe disability  []5: Severe disability  []6: Death      NIH Stroke Scale  Time:   Person Administering Scale: Clint Morales PA-C    1a  Level of consciousness: 0=alert; keenly responsive   1b. LOC questions:  0=Performs both tasks correctly   1c. LOC commands: 0=Performs both tasks correctly   2.  Best Gaze: 0=normal   3.  Visual: 0=No visual loss   4. Facial Palsy: 0=Normal symmetric movement   5a.  Motor left arm: 0=No drift, limb holds 90 (or 45) degrees for full 10 seconds   5b.  Motor right arm: 0=No drift, limb holds 90 (or 45) degrees for full 10 seconds   6a. motor left le=No drift, limb holds 90 (or 45) degrees for full 10 seconds   6b  Motor right le=No drift, limb holds 90 (or 45) degrees for full 10 seconds   7. Limb Ataxia: 0=Absent   8.  Sensory: 0=Normal; no sensory loss   9. Best Language:  1=Mild to moderate aphasia; some obvious loss of fluency or facility of comprehension without significant limitation on ideas expressed or form of expression.   10. Dysarthria: 0=Normal   11. Extinction and Inattention: 0=No abnormality    Total:    1     Hospital Meds  Scheduled-    Infusions-     PRNs-   sodium chloride    Results Reviewed  I have personally reviewed current lab, radiology, and data and agree with results.    CT Angiogram Head w AI Analysis of LVO  Result Date: 2024  1.No hemodynamically significant stenosis, large vessel cut off or aneurysms in the intracranial circulation 2.No hemodynamically significant stenosis, dissection or aneurysms in the extracranial circulation. Electronically Signed: Mikal John MD  2024 9:44 PM EST  Workstation ID: [id number]    CT Angiogram Neck  Result Date: 2024  1.No hemodynamically significant stenosis, large vessel cut off or aneurysms in the intracranial circulation 2.No hemodynamically significant stenosis, dissection or aneurysms in the extracranial circulation. Electronically Signed: Mikla  MD Dora  12/13/2024 9:44 PM EST  Workstation ID: [id number]    CT CEREBRAL PERFUSION WITH & WITHOUT CONTRAST  Result Date: 12/13/2024   1. No evidence of core infarct nor ischemic brain at risk. Electronically Signed: Mikal John MD  12/13/2024 9:39 PM EST  Workstation ID: [id number]    CT Head Without Contrast Stroke Protocol  Result Date: 12/13/2024  Impression: No acute intracranial abnormality. Electronically Signed: Saltykatrin Saini DO  12/13/2024 9:18 PM EST  Workstation ID: [id number]      Significant Labs  Sodium: 138  Creatinine: 0.70  Glucose: 137  WBC: 11.99  Hgb/HCT: 11.7/35.8  Platelets: 250    Assessment and Plan  This patient is a 74-year-old female with risk factors significant for AF (on Eliquis), HTN, and hypothyroid who presented to [facility] ED from home via scene flight with concern for worst headache of her life.  NIH 1 for mild expressive aphasia.  EMS crew reported SBP to the 210s.  Code stroke CT imaging is unrevealing.  Patient was not a candidate for IV thrombolytic therapy or emergent neurointervention.    Antiplatelet PTA: None  Anticoagulant PTA: Eliquis    Headache  Hypertension  -Differentials include hypertensive urgency, TME in setting of possible infection, less likely to be acute ischemic event of the brain  -MRI brain without contrast pending  -Patient s/p Tylenol 650 mg and magnesium 1 g in ED for headache  -Okay to continue home Eliquis  -Holding additional antiplatelet therapy at this time, pending MRI brain results  -Recommend slow return to normotension, SBP goal 180-200 overnight  -NPO, okay for regular diet if passes bedside swallow eval  -TTE pending  -LDL pending, start atorvastatin 80 mg nightly  -A1c pending  -Serial neurochecks per policy, stat Cth for any acute neurological change  -PT/OT/SLP as appropriate    Disposition: Admit to hospital medicine service      Case discussed with the patient, and Dr. Serna.  Thank you for the consult. Stroke neurology will continue to  follow.       Clint Morales PA-C  [facility] [place] [facility]

## 2024-12-14 NOTE — THERAPY EVALUATION
Patient Name: Chelsey Herrera  : 1950    MRN: [id number]                              Today's Date: 2024       Admit Date: 2024    Visit Dx:     ICD-10-CM ICD-9-CM   1. Acute nonintractable headache, unspecified headache type  R51.9 784.0   2. Difficulty with speech  R47.9 784.59   3. Hypertensive emergency  I16.1 401.9     Patient Active Problem List   Diagnosis    Persistent atrial fibrillation    Hypertension    Obesity, Class II, BMI 35-39.9    Fibromyalgia    Nephrolithiasis    History of surgery    Primary osteoarthritis of right hip    Status post total replacement of right hip    Chronic anticoagulation    Elevated hemoglobin A1c    Postoperative pain    Anemia    Hypertensive emergency     Past Medical History:   Diagnosis Date    Arthritis     Back pain     Bilateral kidney stones     Chronic fatigue     Chronic pain     Dental bridge present     Fibromyalgia     Hypertension     Hypothyroidism     Insomnia     Muscle weakness (generalized)     Palpitations     Persistent atrial fibrillation 2017    A) Diagnosed  by PCP when presenting with extreme fatigue B) CHADS-VASc = 3 (age, female, HTN)  C) 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 Changed to flecainide. F) ECV 16 on flecainide. Recurrence of afib about 24hrs later. F) TTE 10/20/16 bin    PONV (postoperative nausea and vomiting)     Sciatica of right side     SOB (shortness of breath)     Urination frequency     Wears eyeglasses     reading     Past Surgical History:   Procedure Laterality Date    CARDIAC CATHETERIZATION      NO STENTS    CARDIAC ELECTROPHYSIOLOGY PROCEDURE N/A 2017    Procedure: PVA. DNS meds.;  Surgeon: Miguelito Owusu DO;  Location: [facility] INVASIVE LOCATION;  Service:     CARDIOVERSION      X 2    CARPAL TUNNEL RELEASE Bilateral     COLONOSCOPY       2013    ENDOSCOPY  05/2022    GALLBLADDER SURGERY      GASTRIC BYPASS      REPLACEMENT TOTAL KNEE BILATERAL Bilateral     SINUS SURGERY      X 2    TOTAL HIP ARTHROPLASTY Right 06/10/2021    Procedure: TOTAL HIP ARTHROPLASTY ANTERIOR MODIFIED RIGHT;  Surgeon: Silas Ravi MD;  Location: [facility];  Service: Orthopedics;  Laterality: Right;    UTERINE SUSPENSION LAPAROSCOPIC        General Information       Row Name 12/14/24 1137          OT Time and Intention    Document Type evaluation  -AN       Row Name 12/14/24 1137          General Information    Patient Profile Reviewed yes  -AN     Prior Level of Function independent:;all household mobility;community mobility;gait;ADL's  Pt reports 2 recent falls OOB  -AN     Existing Precautions/Restrictions fall;other (see comments)  word finding difficulties, expressive aphasia  -AN     Barriers to Rehab none identified  -AN       Row Name 12/14/24 1137          Occupational Profile    Environmental Supports and Barriers (Occupational Profile) walk in shower  -AN       Row Name 12/14/24 1137          Living Environment    People in Home spouse  -AN       Row Name 12/14/24 1137          Home Main Entrance    Number of Stairs, Main Entrance three  -AN     Stair Railings, Main Entrance railing on left side (ascending)  -AN       Row Name 12/14/24 1137          Stairs Within Home, Primary    Number of Stairs, Within Home, Primary none  -AN       Row Name 12/14/24 1137          Cognition    Orientation Status (Cognition) oriented x 3  -AN       Row Name 12/14/24 1137          Safety Issues/Impairments Affecting Functional Mobility    Safety Issues Affecting Function (Mobility) safety precaution awareness;safety precautions follow-through/compliance  -AN     Impairments Affecting Function (Mobility) balance;endurance/activity tolerance;strength  -AN               User Key  (r) = Recorded By, (t) = Taken By, (c) = Cosigned By      Initials Name Provider Type    AN  Enriqueta Andujar OT Occupational Therapist                     Mobility/ADL's       Row Name 12/14/24 1139          Bed Mobility    Bed Mobility supine-sit  -AN     Supine-Sit [place] (Bed Mobility) supervision  -AN     Assistive Device (Bed Mobility) head of bed elevated  -AN       Row Name 12/14/24 1139          Transfers    Transfers sit-stand transfer;stand-sit transfer;toilet transfer  -AN       Row Name 12/14/24 1139          Sit-Stand Transfer    Sit-Stand [place] (Transfers) supervision  -AN       Row Name 12/14/24 1139          Stand-Sit Transfer    Stand-Sit [place] (Transfers) supervision  -AN       Row Name 12/14/24 1139          Toilet Transfer    Type (Toilet Transfer) sit-stand;stand-sit  -AN     [place] Level (Toilet Transfer) standby assist  -AN     Assistive Device (Toilet Transfer) commode;grab bars/safety frame  -AN       Row Name 12/14/24 1139          Functional Mobility    Functional Mobility- Ind. Level standby assist  -AN     Functional Mobility- Comment Pt ambulated short distance within the room in prep for ADLs with stand by assist due to mild instability. Appears to be at baseline.  -AN       Row Name 12/14/24 1139          Activities of Daily Living    BADL Assessment/Intervention upper body dressing;lower body dressing;toileting  -AN       Row Name 12/14/24 1139          Upper Body Dressing Assessment/Training    [place] Level (Upper Body Dressing) don;pajama/robe;set up  -AN     Position (Upper Body Dressing) edge of bed sitting  -AN       Row Name 12/14/24 1139          Lower Body Dressing Assessment/Training    [place] Level (Lower Body Dressing) don;socks;set up  -AN     Position (Lower Body Dressing) edge of bed sitting  -AN       Row Name 12/14/24 1139          Toileting Assessment/Training    [place] Level (Toileting) adjust/manage clothing;standby assist  -AN     Assistive Devices (Toileting) commode  -AN     Position (Toileting) unsupported  sitting;unsupported standing  -AN               User Key  (r) = Recorded By, (t) = Taken By, (c) = Cosigned By      Initials Name Provider Type    Enriqueta Dominguez OT Occupational Therapist                   Obj/Interventions       Row Name 12/14/24 1140          Sensory Assessment (Somatosensory)    Sensory Assessment (Somatosensory) UE sensation intact  -AN       Row Name 12/14/24 1140          Vision Assessment/Intervention    Visual Impairment/Limitations WFL  -AN       Row Name 12/14/24 1140          Range of Motion Comprehensive    General Range of Motion no range of motion deficits identified  -AN       Row Name 12/14/24 1140          Strength Comprehensive (MMT)    General Manual Muscle Testing (MMT) Assessment no strength deficits identified  -AN       Row Name 12/14/24 1140          Motor Skills    Motor Skills coordination  -AN     Coordination WFL;bilateral;upper extremity  -AN       Row Name 12/14/24 1140          Balance    Balance Assessment sitting static balance;sitting dynamic balance;sit to stand dynamic balance;standing dynamic balance;standing static balance  -AN     Static Sitting Balance independent  -AN     Dynamic Sitting Balance independent  -AN     Position, Sitting Balance sitting edge of bed  -AN     Sit to Stand Dynamic Balance verbal cues;standby assist  -AN     Static Standing Balance standby assist  -AN     Dynamic Standing Balance standby assist  -AN               User Key  (r) = Recorded By, (t) = Taken By, (c) = Cosigned By      Initials Name Provider Type    Enriqueta Dominguez OT Occupational Therapist                   Goals/Plan       Row Name 12/14/24 1143          Transfer Goal 1 (OT)    Activity/Assistive Device (Transfer Goal 1, OT) sit-to-stand/stand-to-sit;toilet  -AN     [place] Level/Cues Needed (Transfer Goal 1, OT) modified independence  -AN     Time Frame (Transfer Goal 1, OT) short term goal (STG);3 days  -AN       Row Name 12/14/24 1144           Dressing Goal 1 (OT)    Activity/Device (Dressing Goal 1, OT) upper body dressing;lower body dressing  -AN     [place]/Cues Needed (Dressing Goal 1, OT) modified independence  -AN     Time Frame (Dressing Goal 1, OT) short term goal (STG);3 days  -AN       Row Name 12/14/24 1143          Toileting Goal 1 (OT)    Activity/Device (Toileting Goal 1, OT) adjust/manage clothing;perform perineal hygiene  -AN     [place] Level/Cues Needed (Toileting Goal 1, OT) modified independence  -AN     Time Frame (Toileting Goal 1, OT) long term goal (LTG);1 week  -AN       Row Name 12/14/24 1143          Therapy Assessment/Plan (OT)    Planned Therapy Interventions (OT) activity tolerance training;BADL retraining;functional balance retraining;transfer/mobility retraining;occupation/activity based interventions;patient/caregiver education/training  -AN               User Key  (r) = Recorded By, (t) = Taken By, (c) = Cosigned By      Initials Name Provider Type    AN Enriqueta Andujar OT Occupational Therapist                   Clinical Impression       Row Name 12/14/24 1141          Pain Assessment    Pretreatment Pain Rating 4/10  -AN     Posttreatment Pain Rating 4/10  -AN     Pain Location head  -AN     Pain Side/Orientation posterior  -AN     Pain Management Interventions activity modification encouraged;nursing notified  -AN     Response to Pain Interventions activity participation with tolerable pain  -AN       Row Name 12/14/24 1141          Plan of Care Review    Plan of Care Reviewed With patient  -AN     Progress no change  -AN     Outcome Evaluation Pt presents close to her functional baseline with all ADLs and mobility at this time, but appears to be having mild word finding difficulties with mild balance deficits. Rec home with assist at MD.  -AN       Row Name 12/14/24 1141          Therapy Assessment/Plan (OT)    Patient/Family Therapy Goal Statement (OT) Return to PLOF  -AN     Rehab Potential (OT) good   -AN     Criteria for Skilled Therapeutic Interventions Met (OT) yes;skilled treatment is necessary  -AN     Therapy Frequency (OT) daily  -AN     Predicted Duration of Therapy Intervention (OT) 5 days  -AN       Row Name 12/14/24 1141          Therapy Plan Review/Discharge Plan (OT)    Anticipated Discharge Disposition (OT) home with assist  -AN       Row Name 12/14/24 1141          Vital Signs    Pre Systolic BP Rehab 145  -AN     Pre Treatment Diastolic BP 68  -AN     Post Systolic BP Rehab 191  -AN     Post Treatment Diastolic BP 92  -AN     Pretreatment Heart Rate (beats/min) 75  -AN     Posttreatment Heart Rate (beats/min) 78  -AN     Pre SpO2 (%) 95  -AN     O2 Delivery Pre Treatment room air  -AN     Post SpO2 (%) 95  -AN     O2 Delivery Post Treatment room air  -AN     Pre Patient Position Supine  -AN     Intra Patient Position Standing  -AN     Post Patient Position Sitting  -AN       [facility] Name 12/14/24 1141          Positioning and Restraints    Pre-Treatment Position in bed  -AN     Post Treatment Position chair  -AN     In Chair notified nsg;reclined;call light within reach;encouraged to call for assist;exit alarm on;waffle cushion;legs elevated  -AN               User Key  (r) = Recorded By, (t) = Taken By, (c) = Cosigned By      Initials Name Provider Type    AN Enriqueta Andujar, RONNY Occupational Therapist                   Outcome Measures       Row Name 12/14/24 1144          How much help from another is currently needed...    Putting on and taking off regular lower body clothing? 3  -AN     Bathing (including washing, rinsing, and drying) 3  -AN     Toileting (which includes using toilet bed pan or urinal) 3  -AN     Putting on and taking off regular upper body clothing 3  -AN     Taking care of personal grooming (such as brushing teeth) 4  -AN     Eating meals 4  -AN     AM-PAC 6 Clicks Score (OT) 20  -AN       Row Name 12/14/24 1034          How much help from another person do you currently  need...    Turning from your back to your side while in flat bed without using bedrails? 4  -KR     Moving from lying on back to sitting on the side of a flat bed without bedrails? 3  -KR     Moving to and from a bed to a chair (including a wheelchair)? 3  -KR     Standing up from a chair using your arms (e.g., wheelchair, bedside chair)? 3  -KR     Climbing 3-5 steps with a railing? 3  -KR     To walk in hospital room? 3  -KR     AM-PAC 6 Clicks Score (PT) 19  -KR     Highest Level of Mobility Goal 6 --> Walk 10 steps or more  -KR       Row Name 12/14/24 1144 12/14/24 1034       Modified [place] Scale    Pre-Stroke Modified [place] Scale 6 - Unable to determine (UTD) from the medical record documentation  -AN 6 - Unable to determine (UTD) from the medical record documentation  -KR    Modified [place] Scale 1 - No significant disability despite symptoms.  Able to carry out all usual duties and activities.  -AN 1 - No significant disability despite symptoms.  Able to carry out all usual duties and activities.  -KR      Row Name 12/14/24 1144 12/14/24 1034       Functional Assessment    Outcome Measure Options AM-PAC 6 Clicks Daily Activity (OT);Modified [place]  -AN AM-PAC 6 Clicks Basic Mobility (PT);Modified [place]  -KR              User Key  (r) = Recorded By, (t) = Taken By, (c) = Cosigned By      Initials Name Provider Type    Enriqueta Dominguez OT Occupational Therapist    Shital Ny PT Physical Therapist                    Occupational Therapy Education       Title: PT OT SLP Therapies (In Progress)       Topic: Occupational Therapy (In Progress)       Point: ADL training (Done)       Description:   Instruct learner(s) on proper safety adaptation and remediation techniques during self care or transfers.   Instruct in proper use of assistive devices.                  Learning Progress Summary            Patient Acceptance, E, VU by MAURO at 12/14/2024 3930                      Point: Home exercise program  (Not Started)       Description:   Instruct learner(s) on appropriate technique for monitoring, assisting and/or progressing therapeutic exercises/activities.                  Learner Progress:  Not documented in this visit.              Point: Precautions (Done)       Description:   Instruct learner(s) on prescribed precautions during self-care and functional transfers.                  Learning Progress Summary            Patient Acceptance, E, VU by AN at 12/14/2024 1145                      Point: Body mechanics (Done)       Description:   Instruct learner(s) on proper positioning and spine alignment during self-care, functional mobility activities and/or exercises.                  Learning Progress Summary            Patient Acceptance, E, VU by AN at 12/14/2024 1145                                      User Key       Initials Effective Dates Name Provider Type Discipline    AN 09/21/21 -  Enriqueta Andujar, OT Occupational Therapist OT                  OT Recommendation and Plan  Planned Therapy Interventions (OT): activity tolerance training, BADL retraining, functional balance retraining, transfer/mobility retraining, occupation/activity based interventions, patient/caregiver education/training  Therapy Frequency (OT): daily  Plan of Care Review  Plan of Care Reviewed With: patient  Progress: no change  Outcome Evaluation: Pt presents close to her functional baseline with all ADLs and mobility at this time, but appears to be having mild word finding difficulties with mild balance deficits. Rec home with assist at CT.     Time Calculation:   Evaluation Complexity (OT)  Review Occupational Profile/Medical/Therapy History Complexity: brief/low complexity  Assessment, Occupational Performance/Identification of Deficit Complexity: 1-3 performance deficits  Clinical Decision Making Complexity (OT): problem focused assessment/low complexity  Overall Complexity of Evaluation (OT): low complexity     Time  Calculation- OT       Row Name 12/14/24 1145             Time Calculation- OT    OT Start Time 0914  -AN      OT Received On 12/14/24  -AN      OT Goal Re-Cert Due Date 12/24/24  -AN         Untimed Charges    OT Eval/Re-eval Minutes 46  -AN         Total Minutes    Untimed Charges Total Minutes 46  -AN       Total Minutes 46  -AN                User Key  (r) = Recorded By, (t) = Taken By, (c) = Cosigned By      Initials Name Provider Type    AN Enriqueta Andujar OT Occupational Therapist                  Therapy Charges for Today       Code Description Service Date Service Provider Modifiers Qty    [id number]  OT EVAL LOW COMPLEXITY 4 12/14/2024 Enriqueta Andujar OT GO 1                 Enriqueta Andujar OT  12/14/2024

## 2024-12-14 NOTE — PROGRESS NOTES
Stroke Progress Note       Chief Complaint: Severe headache    Subjective    Subjective     Subjective:  The patient is lying down in the bed in [facility].  No family were at the bedside. The patient stated that her headache has improved compared to yesterday.  She stated that her headache yesterday was 10 and 10 in severity aching in nature and affecting mainly the back of her head and radiate to the front of the head.  She stated that the headache severity is 2-3 out of 10 today.  She denies having any stroke or strokelike symptoms including unilateral weakness, numbness, visual loss, speech disturbances.  I updated the patient regarding the MRI of the brain findings together with what possibly can cause her symptoms and what can be done to prevent similar episodes from happening    No other acute complains at this time    Review of Systems   Constitutional: No fatigue        Objective      Temp:  [98.2 °F (36.8 °C)-99.8 °F (37.7 °C)] 98.2 °F (36.8 °C)  Heart Rate:  [67-99] 78  Resp:  [18] 18  BP: (145-198)/() 145/68    Objective    GEN: lying in bed; in NAD  HENT: normocephalic, non-erythematous oropharynx  CV: no LE edema    NEURO:  Mental Status: A&O x 3, interactive, able to follow commands  Speech: Intact Articulation  CN 2-12:  II - PERRLA  III, IV, VI - EOMI  V - Facial sensation intact  VII -no gross facial asymmetry  VIII - Auditory acuity intact  XII - Tongue protrudes midline    Motor: Patient can move all 4 extremities against gravity  Sensory: intact light touch throughout  Reflexes: negative Mccarty's sign BL  Coordination: no ataxia with finger-to-nose testing  Gait/Station: deferred     Results Review:    I reviewed the patient's new clinical results.    WBC   Date Value Ref Range Status   12/13/2024 11.99 (H) 3.40 - 10.80 10*3/mm3 Final     RBC   Date Value Ref Range Status   12/13/2024 3.98 3.77 - 5.28 10*6/mm3 Final     Hemoglobin   Date Value Ref Range Status   12/13/2024 12.2 12.0 - 17.0 g/dL     12/13/2024 11.7 (L) 12.0 - 15.9 g/dL Final     Hematocrit   Date Value Ref Range Status   12/13/2024 36 (A) 38 - 51 %    12/13/2024 35.8 34.0 - 46.6 % Final     MCV   Date Value Ref Range Status   12/13/2024 89.9 79.0 - 97.0 fL Final     MCH   Date Value Ref Range Status   12/13/2024 29.4 26.6 - 33.0 pg Final     MCHC   Date Value Ref Range Status   12/13/2024 32.7 31.5 - 35.7 g/dL Final     RDW   Date Value Ref Range Status   12/13/2024 13.2 12.3 - 15.4 % Final     RDW-SD   Date Value Ref Range Status   12/13/2024 43.8 37.0 - 54.0 fl Final     MPV   Date Value Ref Range Status   12/13/2024 9.7 6.0 - 12.0 fL Final     Platelets   Date Value Ref Range Status   12/13/2024 250 140 - 450 10*3/mm3 Final     Neutrophil %   Date Value Ref Range Status   12/13/2024 81.3 (H) 42.7 - 76.0 % Final     Lymphocyte %   Date Value Ref Range Status   12/13/2024 10.5 (L) 19.6 - 45.3 % Final     Monocyte %   Date Value Ref Range Status   12/13/2024 6.3 5.0 - 12.0 % Final     Eosinophil %   Date Value Ref Range Status   12/13/2024 0.8 0.3 - 6.2 % Final     Basophil %   Date Value Ref Range Status   12/13/2024 0.8 0.0 - 1.5 % Final     Immature Grans %   Date Value Ref Range Status   12/13/2024 0.3 0.0 - 0.5 % Final     Neutrophils, Absolute   Date Value Ref Range Status   12/13/2024 9.74 (H) 1.70 - 7.00 10*3/mm3 Final     Lymphocytes, Absolute   Date Value Ref Range Status   12/13/2024 1.26 0.70 - 3.10 10*3/mm3 Final     Monocytes, Absolute   Date Value Ref Range Status   12/13/2024 0.76 0.10 - 0.90 10*3/mm3 Final     Eosinophils, Absolute   Date Value Ref Range Status   12/13/2024 0.10 0.00 - 0.40 10*3/mm3 Final     Basophils, Absolute   Date Value Ref Range Status   12/13/2024 0.09 0.00 - 0.20 10*3/mm3 Final     Immature Grans, Absolute   Date Value Ref Range Status   12/13/2024 0.04 0.00 - 0.05 10*3/mm3 Final     nRBC   Date Value Ref Range Status   12/13/2024 0.0 0.0 - 0.2 /100 WBC Final       Lab Results   Component Value Date     GLUCOSE 131 (H) 06/11/2021    BUN 9 06/11/2021    CREATININE 0.70 12/13/2024     06/11/2021    K 4.0 06/11/2021     06/11/2021    CALCIUM 8.9 06/11/2021    PROTEINTOT 7.4 02/21/2017    ALBUMIN 4.60 02/21/2017    ALT 6 12/13/2024    AST 15 12/13/2024    ALKPHOS 121 (H) 02/21/2017    BILITOT 0.9 02/21/2017    GLOB 2.8 02/21/2017    AGRATIO 1.6 02/21/2017    BCR 12.7 06/11/2021    ANIONGAP 10.0 06/11/2021     XR Chest 1 View    Result Date: 12/13/2024  Impression: Borderline cardiomegaly Electronically Signed: Tirso Prather MD  12/13/2024 9:57 PM EST  Workstation ID: [id number]    CT Angiogram Head w AI Analysis of LVO    Result Date: 12/13/2024  1.No hemodynamically significant stenosis, large vessel cut off or aneurysms in the intracranial circulation 2.No hemodynamically significant stenosis, dissection or aneurysms in the extracranial circulation. Electronically Signed: Mikal John MD  12/13/2024 9:44 PM EST  Workstation ID: [id number]    CT Angiogram Neck    Result Date: 12/13/2024  1.No hemodynamically significant stenosis, large vessel cut off or aneurysms in the intracranial circulation 2.No hemodynamically significant stenosis, dissection or aneurysms in the extracranial circulation. Electronically Signed: Mikal John MD  12/13/2024 9:44 PM EST  Workstation ID: [id number]    CT CEREBRAL PERFUSION WITH & WITHOUT CONTRAST    Result Date: 12/13/2024   1. No evidence of core infarct nor ischemic brain at risk. Electronically Signed: Mikal John MD  12/13/2024 9:39 PM EST  Workstation ID: [id number]    CT Head Without Contrast Stroke Protocol    Result Date: 12/13/2024  Impression: No acute intracranial abnormality. Electronically Signed: Salty Saini DO  12/13/2024 9:18 PM EST  Workstation ID: [id number]   Results for orders placed during the hospital encounter of 02/22/17    Adult Transesophageal Echo    Interpretation Summary  · Left atrial cavity size is moderately dilated.  · Left ventricular  function is normal. Estimated EF = 55%.  · Mild mitral valve regurgitation is present  · Mild tricuspid valve regurgitation is present.  · Mild aortic valve regurgitation is present.  · No evidence of intracardiac thrombus or mass, clear left atrial appendage.    -MRI of the brain from 12- images were personally reviewed and showed no acute ischemic or hemorrhagic strokes  -CTA angio of the head and neck from 12- images were personally reviewed and showed no flow limiting stenosis or LVO  -A1c and lipid panel are ordered and still pending  -Transthoracic echocardiogram is ordered and still pending    Assessment/Plan     This patient is a 74-year-old female with risk factors significant for AF (on Eliquis), HTN, and hypothyroid who presented to [facility] ED from home via scene flight with concern for worst headache of her life.  NIH 1 for mild expressive aphasia.  EMS crew reported SBP to the 210s.  Code stroke CT imaging is unrevealing.  Patient was not a candidate for IV thrombolytic therapy or emergent neurointervention.     Antiplatelet PTA: None  Anticoagulant PTA: Eliquis     #Headache  #Hypertensive emergency  #A-fib on Eliquis  -Etiology of patient's symptoms likely due to hypertensive emergency  -MRI of the brain from 12- images were personally reviewed and showed no acute ischemic or hemorrhagic strokes  -CTA angio of the head and neck from 12- images were personally reviewed and showed no flow limiting stenosis or LVO  -A1c and lipid panel are ordered and still pending  -Transthoracic echocardiogram is ordered and still pending    Recommendations  -Management of hypertensive emergency per the hospitalist team.  Given patient is on anticoagulation recommend bringing patient blood pressure under 140/90 as appropriate  -Okay to continue home Eliquis  -LDL pending, start atorvastatin 80 mg nightly.  It can be discontinued if low-density lipoprotein level is less than 70  -TTE  pending  -Serial neurochecks per policy, stat Cth for any acute neurological change  -PT/OT/SLP as appropriate    Patient education: call 911 or present to emergency department with any stroke symptom, including unilateral face, arm, or leg weakness, numbness, or paresthesias, unilateral facial droop, speech deficits, dizziness with nausea, vomiting, nystagmus, and incoordination, visual deficits, or severe onset headache.    Stroke will sign off. Please call for any further questions or concerns  =================================  Matias Walsh MD, Msc, PhD  Vascular Neurologist  [facility]

## 2024-12-14 NOTE — PROGRESS NOTES
[facility] Medicine Services  PROGRESS NOTE    Patient Name: Chelsey Herrera  : 1950  MRN: [id number]    Date of Admission: 2024  Primary Care Physician: Everardo Mccormack MD    Subjective   Subjective     CC:  Headache, aphasia    HPI:  Patient seen resting comfortably in bedside chair in no acute distress.  Patient states her headache and aphasia completely resolved.  No acute complaints or concerns at this time.      Objective   Objective     Vital Signs:   Temp:  [98.2 °F (36.8 °C)-99.8 °F (37.7 °C)] 98.5 °F (36.9 °C)  Heart Rate:  [] 91  Resp:  [18] 18  BP: (145-200)/() 149/98     Physical Exam  Constitutional:       General: She is not in acute distress.  Eyes:      Extraocular Movements: Extraocular movements intact.      Pupils: Pupils are equal, round, and reactive to light.   Cardiovascular:      Rate and Rhythm: Normal rate.      Pulses: Normal pulses.   Pulmonary:      Effort: Pulmonary effort is normal. No respiratory distress.   Abdominal:      General: There is no distension.      Palpations: Abdomen is soft.      Tenderness: There is no abdominal tenderness. There is no guarding.   Musculoskeletal:         General: Normal range of motion.      Right lower leg: No edema.      Left lower leg: No edema.   Skin:     General: Skin is warm.   Neurological:      General: No focal deficit present.      Mental Status: She is alert and oriented to person, place, and time.      Cranial Nerves: No cranial nerve deficit.      Sensory: No sensory deficit.      Motor: No weakness.   Psychiatric:         Mood and Affect: Mood normal.         Behavior: Behavior normal.           Results Reviewed:  LAB RESULTS:      Lab 24   WBC  --   --  11.99*   HEMOGLOBIN  --   --  11.7*   HEMOGLOBIN, POC 12.2 12.2  --    HEMATOCRIT  --   --  35.8   HEMATOCRIT POC 36* 36*  --    PLATELETS  --   --  250   NEUTROS ABS  --   --  9.74*    IMMATURE GRANS (ABS)  --   --  0.04   LYMPHS ABS  --   --  1.26   MONOS ABS  --   --  0.76   EOS ABS  --   --  0.10   MCV  --   --  89.9   PROTIME  --   --  13.9   APTT  --   --  33.8         Lab 12/13/24 2126 12/13/24 2122   CREATININE 0.70 0.70   EGFR  --  89.8         Lab 12/13/24 2120   ALT (SGPT) 6   AST (SGOT) 15         Lab 12/13/24 2120   PROTIME 13.9   INR 1.06                 Brief Urine Lab Results  (Last result in the past 365 days)        Color   Clarity   Blood   Leuk Est   Nitrite   Protein   CREAT   Urine HCG        12/13/24 2301 Yellow   Clear   Negative   Negative   Negative   Negative                   Microbiology Results Abnormal       None            MRI Brain Without Contrast    Result Date: 12/14/2024  MRI BRAIN WO CONTRAST Date of Exam: 12/14/2024 4:22 AM EST Indication: Stroke, follow up Headache with mild expressive aphasia, acute stroke suspected.  Comparison: Stroke CT 12/13/2024 Technique:  Routine multiplanar/multisequence sequence images of the brain were obtained without contrast administration. Findings: Parenchyma: No evidence of infarct. No evidence of parenchymal hemorrhage. Few areas of punctate susceptibility may reflect prior microhemorrhage. Scattered periventricular and subcortical FLAIR hyperintensities are nonspecific but most often associated with chronic small vessel ischemic changes. Mild parenchymal volume loss. Midline structures appear intact. No midline shift or herniation. Ventricles and extra-axial spaces: Prominent ventricles sulci secondary to volume loss. No extra-axial fluid collection seen. Other: Calvarial marrow signal is intact. Lens replacements. Scattered paranasal sinus mucosal thickening. Mastoid air cells are clear. Major intracranial arterial flow voids appear grossly intact.     Impression: Impression: 1.No evidence of acute infarct. 2.Chronic changes as above. Electronically Signed: Roddy Garcia MD  12/14/2024 9:48 AM EST  Workstation  ID: [id number]    XR Chest 1 View    Result Date: 12/13/2024  XR CHEST 1 VW Date of Exam: 12/13/2024 9:22 PM EST Indication: Acute Stroke Protocol (onset < 12 hrs) Comparison: None available. Findings: No acute infiltrate is seen. Cardiomegaly is noted. No effusion is seen.     Impression: Impression: Borderline cardiomegaly Electronically Signed: Tirso Prather MD  12/13/2024 9:57 PM EST  Workstation ID: [id number]    CT Angiogram Head w AI Analysis of LVO    Result Date: 12/13/2024  CT ANGIOGRAM HEAD W AI ANALYSIS OF LVO, CT ANGIOGRAM NECK Date of Exam: 12/13/2024 9:11 PM EST Indication: Neuro Deficit, acute, Stroke suspected Neuro deficit, acute stroke suspected. Comparison: None available. Technique: CTA of the head and neck was performed after the uneventful intravenous administration of 115 cc Isovue-370. Reconstructed coronal and sagittal images were also obtained. In addition, a 3-D volume rendered image was created for interpretation.  Automated exposure control and iterative reconstruction methods were used. Findings: CTA NECK: *Aortic arch: No aneurysm. No significant stenosis or occlusion of the major arch vessels. *Left carotid system: Mild atherosclerotic disease at the bulb. No aneurysm, significant stenosis or occlusion. *Right carotid system: Mild atherosclerotic disease at the bulb. No aneurysm, significant stenosis or occlusion. *Vertebrobasilar system: The vertebral arteries arise from their respective subclavian arteries. Left vertebral artery is dominant. No aneurysm, significant stenosis or occlusion. CTA HEAD: *Anterior circulation: No aneurysm, significant stenosis or occlusion. *Posterior circulation: No aneurysm, significant stenosis or occlusion. *Anatomic variants: Fetal origin of the right PCA. *Venous sinuses: Patent.     Impression: 1.No hemodynamically significant stenosis, large vessel cut off or aneurysms in the intracranial circulation 2.No hemodynamically significant stenosis,  dissection or aneurysms in the extracranial circulation. Electronically Signed: Mikal John MD  12/13/2024 9:44 PM EST  Workstation ID: [id number]    CT Angiogram Neck    Result Date: 12/13/2024  CT ANGIOGRAM HEAD W AI ANALYSIS OF LVO, CT ANGIOGRAM NECK Date of Exam: 12/13/2024 9:11 PM EST Indication: Neuro Deficit, acute, Stroke suspected Neuro deficit, acute stroke suspected. Comparison: None available. Technique: CTA of the head and neck was performed after the uneventful intravenous administration of 115 cc Isovue-370. Reconstructed coronal and sagittal images were also obtained. In addition, a 3-D volume rendered image was created for interpretation.  Automated exposure control and iterative reconstruction methods were used. Findings: CTA NECK: *Aortic arch: No aneurysm. No significant stenosis or occlusion of the major arch vessels. *Left carotid system: Mild atherosclerotic disease at the bulb. No aneurysm, significant stenosis or occlusion. *Right carotid system: Mild atherosclerotic disease at the bulb. No aneurysm, significant stenosis or occlusion. *Vertebrobasilar system: The vertebral arteries arise from their respective subclavian arteries. Left vertebral artery is dominant. No aneurysm, significant stenosis or occlusion. CTA HEAD: *Anterior circulation: No aneurysm, significant stenosis or occlusion. *Posterior circulation: No aneurysm, significant stenosis or occlusion. *Anatomic variants: Fetal origin of the right PCA. *Venous sinuses: Patent.     Impression: 1.No hemodynamically significant stenosis, large vessel cut off or aneurysms in the intracranial circulation 2.No hemodynamically significant stenosis, dissection or aneurysms in the extracranial circulation. Electronically Signed: Mikal John MD  12/13/2024 9:44 PM EST  Workstation ID: [id number]    CT CEREBRAL PERFUSION WITH & WITHOUT CONTRAST    Result Date: 12/13/2024  CT CEREBRAL PERFUSION W WO CONTRAST Date of Exam: 12/13/2024 9:11 PM EST  Indication: Neuro Deficit, acute, Stroke suspected Neuro deficit, acute stroke suspected.  Comparison: None available. Technique: Axial CT images of the brain were obtained prior to and after the administration of 115 cc Isovue-370. Core blood volume, core blood flow, mean transit time, and Tmax images were obtained utilizing the Rapid software protocol. A limited CT angiogram of the head was also performed to measure the blood vessel density. The radiation dose reduction device was turned on for each scan per the ALARA (As Low as Reasonably Achievable) protocol. Findings: Cerebral blood flow, blood volume, and mean transit time maps are symmetric without large perfusion defect.  CBF<30% volume: 0mL Tmax>6sec volume: 0 mL Mismatch volume: 0mL Mismatch ratio: None        Impression:  1. No evidence of core infarct nor ischemic brain at risk. Electronically Signed: Mikal John MD  12/13/2024 9:39 PM EST  Workstation ID: [id number]    CT Head Without Contrast Stroke Protocol    Result Date: 12/13/2024  CT HEAD WO CONTRAST STROKE PROTOCOL Date of Exam: 12/13/2024 9:05 PM EST Indication: Neuro deficit, acute, worst headache of life) Comparison: None available. Technique: Axial CT images were obtained of the head without contrast administration.  Reconstructed coronal images were also obtained. Automated exposure control and iterative construction methods were used. Scan Time: 2109 Results discussed with Rene stroke navigator at 2117. Findings: There is no CT evidence of acute hemorrhage, infarct, mass, mass effect, or midline shift. The gray-white matter differentiation is preserved. There is no hydrocephalus. The sulci and ventricles are symmetric.  No extraaxial fluid collections. The globes  and orbital contents are normal and symmetric. The mastoid air cells and visualized paranasal sinuses are clear. No skull fractures or suspicious calvarial lesions.     Impression: Impression: No acute intracranial abnormality.  Electronically Signed: Salty Saini, DO  12/13/2024 9:18 PM EST  Workstation ID: [id number]     Results for orders placed during the hospital encounter of 02/22/17    Adult Transesophageal Echo    Interpretation Summary  · Left atrial cavity size is moderately dilated.  · Left ventricular function is normal. Estimated EF = 55%.  · Mild mitral valve regurgitation is present  · Mild tricuspid valve regurgitation is present.  · Mild aortic valve regurgitation is present.  · No evidence of intracardiac thrombus or mass, clear left atrial appendage.      Current medications:  Scheduled Meds:apixaban, 5 mg, Oral, Q12H  atorvastatin, 80 mg, Oral, Nightly  DULoxetine, 30 mg, Oral, Daily  flecainide, 50 mg, Oral, Q12H  fluticasone, 2 spray, Each Nare, Daily  levothyroxine, 50 mcg, Oral, Daily  [Held by provider] metoprolol tartrate, 50 mg, Oral, BID  sodium chloride, 10 mL, Intravenous, Q12H  sodium chloride, 10 mL, Intravenous, Q12H  traZODone, 100 mg, Oral, Daily      Continuous Infusions:niCARdipine, 5-15 mg/hr, Last Rate: Stopped (12/14/24 1212)      PRN Meds:.  acetaminophen **OR** acetaminophen **OR** acetaminophen    senna-docusate sodium **AND** polyethylene glycol **AND** bisacodyl **AND** bisacodyl    Calcium Replacement - Follow Nurse / BPA Driven Protocol    [Held by provider] furosemide    HYDROcodone-acetaminophen    Magnesium Standard Dose Replacement - Follow Nurse / BPA Driven Protocol    ondansetron ODT **OR** ondansetron    Phosphorus Replacement - Follow Nurse / BPA Driven Protocol    Potassium Replacement - Follow Nurse / BPA Driven Protocol    sodium chloride    sodium chloride    sodium chloride    sodium chloride    sodium chloride    Assessment & Plan   Assessment & Plan     Active Hospital Problems    Diagnosis  POA    **Hypertensive emergency [I16.1]  Yes    Obesity, Class II, BMI 35-39.9 [E66.812]  Yes    Fibromyalgia [M79.7]  Yes    Persistent atrial fibrillation [I48.19]  Yes      Resolved  Hospital Problems   No resolved problems to display.        Brief Hospital Course to date:  Chelsey Herrera is a 74 y.o. female with past medical history of A-fib on anticoagulation, fibromyalgia, hypothyroidism, mood disorder presenting with headache and aphasia.  Patient called EMS and was found to be hypertensive with systolic blood pressure in the 210s.  Patient is not on any home antihypertensives.  Stroke neurology was consulted upon presentation.  CT head, CTA head and neck, CT perfusion without acute infarct, MRI brain without acute infarct.  Symptoms likely secondary to hypertensive emergency.    Hypertensive emergency  -Systolic blood pressures in 210s prior to arrival  -No antihypertensive medications at home, though allergy listed to Accupril  -Presented with headache and aphasia, though both have resolved completely  -CT head, CTA head and neck, CT perfusion, MRI brain without acute infarct  -Will start Cardene drip to allow normalization of blood pressure before starting oral antihypertensives (likely nifedipine or losartan)   -PT/OT recommend home with assist    Leukocytosis  - Likely reactive   - No signs/symptoms of infection     A-fib  -Continue home Eliquis and flecainide    Mood disorder  -Continue home duloxetine and trazodone    Hypothyroidism  -Continue home levothyroxine    Expected Discharge Location and Transportation: TBD  Expected Discharge   Expected discharge date/ time has not been documented.     VTE Prophylaxis:  Pharmacologic & mechanical VTE prophylaxis orders are present.         AM-PAC 6 Clicks Score (PT): 19 (12/14/24 1034)    CODE STATUS:   Code Status and Medical Interventions: CPR (Attempt to Resuscitate); Full Support   Ordered at: 12/13/24 9727     Code Status (Patient has no pulse and is not breathing):    CPR (Attempt to Resuscitate)     Medical Interventions (Patient has pulse or is breathing):    Full Support       Deon Alcazar, DO  12/14/24

## 2024-12-14 NOTE — THERAPY EVALUATION
Acute Care - Speech Language Pathology Initial Evaluation  [facility]  Cognitive-Communication Evaluation       Patient Name: Chelsey Herrera  : 1950  MRN: [id number]  Today's Date: 2024               Admit Date: 2024     Visit Dx:    ICD-10-CM ICD-9-CM   1. Acute nonintractable headache, unspecified headache type  R51.9 784.0   2. Difficulty with speech  R47.9 784.59   3. Hypertensive emergency  I16.1 401.9   4. Cognitive communication deficit  R41.841 799.52     Patient Active Problem List   Diagnosis    Persistent atrial fibrillation    Hypertension    Obesity, Class II, BMI 35-39.9    Fibromyalgia    Nephrolithiasis    History of surgery    Primary osteoarthritis of right hip    Status post total replacement of right hip    Chronic anticoagulation    Elevated hemoglobin A1c    Postoperative pain    Anemia    Hypertensive emergency     Past Medical History:   Diagnosis Date    Arthritis     Back pain     Bilateral kidney stones     Chronic fatigue     Chronic pain     Dental bridge present     Fibromyalgia     Hypertension     Hypothyroidism     Insomnia     Muscle weakness (generalized)     Palpitations     Persistent atrial fibrillation 2017    A) Diagnosed  by PCP when presenting with extreme fatigue B) CHADS-VASc = 3 (age, female, HTN)  C) 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 Changed to flecainide. F) ECV 16 on flecainide. Recurrence of afib about 24hrs later. F) TTE 10/20/16 bin    PONV (postoperative nausea and vomiting)     Sciatica of right side     SOB (shortness of breath)     Urination frequency     Wears eyeglasses     reading     Past Surgical History:   Procedure Laterality Date    CARDIAC CATHETERIZATION      NO STENTS    CARDIAC ELECTROPHYSIOLOGY PROCEDURE N/A 2017    Procedure: PVA. DNS meds.;  Surgeon:  Miguelito Owusu DO;  Location:  [facility];  Service:     CARDIOVERSION      X 2    CARPAL TUNNEL RELEASE Bilateral     COLONOSCOPY      2013    ENDOSCOPY  05/2022    GALLBLADDER SURGERY      GASTRIC BYPASS      REPLACEMENT TOTAL KNEE BILATERAL Bilateral     SINUS SURGERY      X 2    TOTAL HIP ARTHROPLASTY Right 06/10/2021    Procedure: TOTAL HIP ARTHROPLASTY ANTERIOR MODIFIED RIGHT;  Surgeon: Silas Ravi MD;  Location: [facility] OR;  Service: Orthopedics;  Laterality: Right;    UTERINE SUSPENSION LAPAROSCOPIC         SLP Recommendation and Plan  SLP Diagnosis: functional speech/language skills, mild, cognitive-linguistic disorder (12/14/24 1115)  SLP Diagnosis Comments: Speech and language abilties are grossly functional per this eval. Pt presents w/ mild cognitive linguistic deficits. Pt reports overall level of fx is acutely worse than baseline. SLP will f/u for tx as appropriate (12/14/24 1115)           SLC Criteria for Skilled Therapy Interventions Met: yes (12/14/24 1115)  Anticipated Discharge Disposition (SLP): home with assist (12/14/24 1115)        Therapy Frequency (SLP SLC): 3 days per week (12/14/24 1115)  Predicted Duration Therapy Intervention (Days): 1 week (12/14/24 1115)                                    SLP EVALUATION (Last 72 Hours)       SLP SLC Evaluation       Row Name 12/14/24 1115                   Communication Assessment/Intervention    Document Type evaluation  -MH        Subjective Information no complaints  -        Patient Observations alert;cooperative  -        Patient/Family/Caregiver Comments/Observations family present  -MH        Patient Effort good  -MH        Symptoms Noted During/After Treatment none  -MH           General Information    Patient Profile Reviewed yes  -MH        Pertinent History Of Current Problem Adm w/ aphasia & c/o headache. Hx: HTN, HLD, a-fib, arthritis, fibromyalgia. MRI negative for acute infarct  -MH         Precautions/Limitations, Vision [facility] with corrective lenses  -        Precautions/Limitations, Hearing [facility]        Prior Level of Function-Communication unknown  -        Plans/Goals Discussed with patient;family;agreed upon  [facility]        Barriers to Rehab none identified  -        Patient's Goals for Discharge patient did not state  -        Family Goals for Discharge family did not state  -           Pain    Additional Documentation Pain Scale: FACES Pre/Post-Treatment (Group)  [facility]           Pain Scale: FACES Pre/Post-Treatment    Pain: FACES Scale, Pretreatment 0-->no hurt  -        Posttreatment Pain Rating 0-->no hurt  -           Comprehension Assessment/Intervention    Comprehension Assessment/Intervention Auditory Comprehension  -           Auditory Comprehension Assessment/Intervention    Auditory Comprehension (Communication) [facility]        Able to Identify Objects/Pictures (Communication) familiar objects;body part;pictures of common objects;[facility]        Answers Questions (Communication) yes/no; questions;personal;simple;[facility]        Able to Follow Commands (Communication) 1-step;2-step;[facility]           Expression Assessment/Intervention    Expression Assessment/Intervention verbal expression  -           Verbal Expression Assessment/Intervention    Verbal Expression [facility]        Automatic Speech (Communication) response to greeting;days of week;months of year;[facility]        Repetition phrases;[facility]        Phrase Completion automatic/predictable;unpredictable;[facility]        Responsive Naming simple;[facility]        Confrontational Naming high frequency;[facility]        Spontaneous/Functional Words simple;[facility]        Sentence Formulation simple;[facility]        Conversational Discourse/Fluency [facility]           Motor Speech Assessment/Intervention    Motor Speech Function [facility]           Cognitive Assessment Intervention- SLP    Cognitive Function (Cognition) mild  impairment  -MH        Orientation Status (Cognition) awareness of basic personal information;person;place;time;situation;WFL  -        Memory (Cognitive) simple;immediate;mild impairment  -MH        Attention (Cognitive) selective;sustained;mild impairment  -MH        Thought Organization (Cognitive) concrete divergent;concrete convergent;mild impairment  -MH        Reasoning (Cognitive) simple;mild impairment  -MH        Problem Solving (Cognitive) simple;temporal;mild impairment  -MH        Functional Math (Cognitive) simple;word problems;mild impairment  -MH        Executive Function (Cognition) deficit awareness;WFL  -           SLP Evaluation Clinical Impressions    SLP Diagnosis functional speech/language skills;mild;cognitive-linguistic disorder  -        SLP Diagnosis Comments Speech and language abilties are grossly functional per this eval. Pt presents w/ mild cognitive linguistic deficits. Pt reports overall level of fx is acutely worse than baseline. SLP will f/u for tx as appropriate  -        Rehab Potential/Prognosis good  -        SLC Criteria for Skilled Therapy Interventions Met yes  -        Functional Impact functional impact in ADLs;difficulty in expressing complex messages  -           Recommendations    Therapy Frequency (SLP SLC) 3 days per week  -        Predicted Duration Therapy Intervention (Days) 1 week  -        Anticipated Discharge Disposition (SLP) home with assist  -                  User Key  (r) = Recorded By, (t) = Taken By, (c) = Cosigned By      Initials Name Effective Dates    Yoli Henley, MS [facility]-SLP 05/12/23 -                        EDUCATION  The patient has been educated in the following areas:     Cognitive Impairment Communication Impairment].           SLP GOALS       Row Name 12/14/24 3705             Patient will demonstrate functional cognitive-linguistic skills for return to discharge environment    [place] Ind[facility]  -       Time frame 1 week  -MH      Progress/Outcomes new goal  -         SLP Diagnostic Treatment     Patient will participate in further assessment in the following areas reading comprehension;graphic expression  -      Time Frame (Diagnostic) 1 week  -MH      Progress/Outcomes (Additional Goal 1, SLP) new goal  -MH         Attention Goal 1 (SLP)    Improve Attention by Goal 1 (SLP) looking at speaker;attending to task;80%;with minimal cues (75-90%)  -      Time Frame (Attention Goal 1, SLP) 1 week  -MH      Progress/Outcomes (Attention Goal 1, SLP) new goal  -MH         Memory Skills Goal 1 (SLP)    Improve Memory Skills Through Goal 1 (SLP) recalling related word lists immediately;recalling unrelated word lists immediately;80%;with minimal cues (75-90%)  -      Time Frame (Memory Skills Goal 1, SLP) 1 week  -MH      Progress/Outcomes (Memory Skills Goal 1, SLP) new goal  -MH         Organizational Skills Goal 1 (SLP)    Improve Thought Organization Through Goal 1 (SLP) completing a divergent naming task;completing a convergent naming task;naming similarities and differences;completing a verbal sequencing task;80%;with minimal cues (75-90%)  -      Time Frame (Thought Organization Skills Goal 1, SLP) 1 week  -MH      Progress/Outcomes (Thought Organization Skills Goal 1, SLP) new goal  -         Functional Problem Solving Skills Goal 1 (SLP)    Improve Problem Solving Through Goal 1 (SLP) answer questions about ADL problems;determine solutions to simple ADL/safety problems;tell similarity between items;sequence steps in a task;80%;with minimal cues (75-90%)  -      Time Frame (Problem Solving Goal 1, SLP) 1 week  -      Progress/Outcomes (Problem Solving Goal 1, SLP) new goal  -                User Key  (r) = Recorded By, (t) = Taken By, (c) = Cosigned By      Initials Name Provider Type    Yoli Henley MS CCC-SLP Speech and Language Pathologist                              Time Calculation:       Time Calculation- SLP       Row Name 12/14/24 1202             Time Calculation- SLP    SLP Start Time 1115  -MH      SLP Received On 12/14/24  -         Untimed Charges    [id number]-AJ Eval Speech and Production w/ Language Minutes 41  -MH         Total Minutes    Untimed Charges Total Minutes 41  -       Total Minutes 41  -MH                User Key  (r) = Recorded By, (t) = Taken By, (c) = Cosigned By      Initials Name Provider Type     Yoli Willoughby, MS CCC-SLP Speech and Language Pathologist                    Therapy Charges for Today       Code Description Service Date Service Provider Modifiers Qty    [id number]  ST EVAL SPEECH AND PROD W LANG  3 12/14/2024 Yoli Willoughby, MS PAMELA-SLP GN 1                       Yoli Willoughby MS CCC-SLP  12/14/2024

## 2024-12-14 NOTE — THERAPY EVALUATION
Patient Name: Chelsey Herrera  : 1950    MRN: [id number]                              Today's Date: 2024       Admit Date: 2024    Visit Dx:     ICD-10-CM ICD-9-CM   1. Acute nonintractable headache, unspecified headache type  R51.9 784.0   2. Difficulty with speech  R47.9 784.59   3. Hypertensive emergency  I16.1 401.9     Patient Active Problem List   Diagnosis    Persistent atrial fibrillation    Hypertension    Obesity, Class II, BMI 35-39.9    Fibromyalgia    Nephrolithiasis    History of surgery    Primary osteoarthritis of right hip    Status post total replacement of right hip    Chronic anticoagulation    Elevated hemoglobin A1c    Postoperative pain    Anemia    Hypertensive emergency     Past Medical History:   Diagnosis Date    Arthritis     Back pain     Bilateral kidney stones     Chronic fatigue     Chronic pain     Dental bridge present     Fibromyalgia     Hypertension     Hypothyroidism     Insomnia     Muscle weakness (generalized)     Palpitations     Persistent atrial fibrillation 2017    A) Diagnosed  by PCP when presenting with extreme fatigue B) CHADS-VASc = 3 (age, female, HTN)  C) 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 Changed to flecainide. F) ECV 16 on flecainide. Recurrence of afib about 24hrs later. F) TTE 10/20/16 bin    PONV (postoperative nausea and vomiting)     Sciatica of right side     SOB (shortness of breath)     Urination frequency     Wears eyeglasses     reading     Past Surgical History:   Procedure Laterality Date    CARDIAC CATHETERIZATION      NO STENTS    CARDIAC ELECTROPHYSIOLOGY PROCEDURE N/A 2017    Procedure: PVA. DNS meds.;  Surgeon: Miguelito Owusu DO;  Location: [facility] INVASIVE LOCATION;  Service:     CARDIOVERSION      X 2    CARPAL TUNNEL RELEASE Bilateral     COLONOSCOPY       2013    ENDOSCOPY  05/2022    GALLBLADDER SURGERY      GASTRIC BYPASS      REPLACEMENT TOTAL KNEE BILATERAL Bilateral     SINUS SURGERY      X 2    TOTAL HIP ARTHROPLASTY Right 06/10/2021    Procedure: TOTAL HIP ARTHROPLASTY ANTERIOR MODIFIED RIGHT;  Surgeon: Silas Ravi MD;  Location[facility];  Service: Orthopedics;  Laterality: Right;    UTERINE SUSPENSION LAPAROSCOPIC        General Information       Row Name 12/14/24 1030          Physical Therapy Time and Intention    Document Type evaluation  -KR     Mode of Treatment physical therapy;individual therapy  -KR       Row Name 12/14/24 1030          General Information    Patient Profile Reviewed yes  -KR     Prior Level of Function independent:;all household mobility;community mobility;ADL's  pt reports 2 falls out of bed in the last 6 months  -KR     Existing Precautions/Restrictions fall  -KR     Barriers to Rehab none identified  -KR       Row Name 12/14/24 1030          Living Environment    People in Home spouse  -KR       Row Name 12/14/24 1030          Home Main Entrance    Number of Stairs, Main Entrance three  -KR     Stair Railings, Main Entrance railing on left side (ascending)  -KR       Row Name 12/14/24 1030          Stairs Within Home, Primary    Number of Stairs, Within Home, Primary none  -KR       Row Name 12/14/24 1030          Cognition    Orientation Status (Cognition) oriented x 3  -KR       Row Name 12/14/24 1030          Safety Issues/Impairments Affecting Functional Mobility    Impairments Affecting Function (Mobility) balance;endurance/activity tolerance;strength  -KR               User Key  (r) = Recorded By, (t) = Taken By, (c) = Cosigned By      Initials Name Provider Type    KR Shital Goel, PT Physical Therapist                   Mobility       Row Name 12/14/24 1030          Sit-Stand Transfer    Sit-Stand [place] (Transfers) supervision  -KR     Comment, (Sit-Stand Transfer) 1x from chair  -KR       Row Name  12/14/24 1030          Gait/Stairs (Locomotion)    [place] Level (Gait) contact guard  -KR     Distance in Feet (Gait) 60  60 + 60  -KR     Deviations/Abnormal Patterns (Gait) bilateral deviations  -KR     Bilateral Gait Deviations forward flexed posture  -KR     [place] Level (Stairs) contact guard  -KR     Handrail Location (Stairs) left side (ascending);left side (descending)  -KR     Number of Steps (Stairs) 4  -KR     Ascending Technique (Stairs) step-to-step  -KR     Descending Technique (Stairs) step-to-step  -KR     Comment, (Gait/Stairs) pt with increased mediolateral sway and lateral excursion when performing head turns while ambulating.  -KR               User Key  (r) = Recorded By, (t) = Taken By, (c) = Cosigned By      Initials Name Provider Type    Shital Ny, PT Physical Therapist                   Obj/Interventions       Row Name 12/14/24 1031          Range of Motion Comprehensive    General Range of Motion no range of motion deficits identified  -KR       Row Name 12/14/24 1031          Strength Comprehensive (MMT)    General Manual Muscle Testing (MMT) Assessment lower extremity strength deficits identified  -KR     Comment, General Manual Muscle Testing (MMT) Assessment BLEs grossly 4/5  -KR       Row Name 12/14/24 1031          Balance    Balance Assessment sitting static balance;sitting dynamic balance;standing static balance;standing dynamic balance  -KR     Static Sitting Balance independent  -KR     Dynamic Sitting Balance standby assist  -KR     Position, Sitting Balance unsupported;sitting in chair  -KR     Static Standing Balance standby assist  -KR     Dynamic Standing Balance contact guard  -KR     Position/Device Used, Standing Balance unsupported  -KR     Balance Interventions sitting;standing;sit to stand;static;dynamic  -KR       Row Name 12/14/24 1031          Sensory Assessment (Somatosensory)    Sensory Assessment (Somatosensory) LE sensation intact  -KR                User Key  (r) = Recorded By, (t) = Taken By, (c) = Cosigned By      Initials Name Provider Type    Shital Ny, PT Physical Therapist                   Goals/Plan       Row Name 12/14/24 1033          Bed Mobility Goal 1 (PT)    Activity/Assistive Device (Bed Mobility Goal 1, PT) bed mobility activities, all  -KR     [place] Level/Cues Needed (Bed Mobility Goal 1, PT) independent  -KR     Time Frame (Bed Mobility Goal 1, PT) short term goal (STG);2 days  -KR       Row Name 12/14/24 1033          Transfer Goal 1 (PT)    Activity/Assistive Device (Transfer Goal 1, PT) bed-to-chair/chair-to-bed;sit-to-stand/stand-to-sit  -KR     [place] Level/Cues Needed (Transfer Goal 1, PT) independent  -KR     Time Frame (Transfer Goal 1, PT) long term goal (LTG);3 days  -KR       Row Name 12/14/24 1033          Gait Training Goal 1 (PT)    Activity/Assistive Device (Gait Training Goal 1, PT) gait (walking locomotion);improve balance and speed;increase endurance/gait distance  -KR     [place] Level (Gait Training Goal 1, PT) independent  -KR     Distance (Gait Training Goal 1, PT) 150  -KR     Time Frame (Gait Training Goal 1, PT) long term goal (LTG);3 days  -KR       Row Name 12/14/24 1033          Stairs Goal 1 (PT)    Activity/Assistive Device (Stairs Goal 1, PT) stairs, all skills;using handrail, left  -KR     [place] Level/Cues Needed (Stairs Goal 1, PT) modified independence  -KR     Number of Stairs (Stairs Goal 1, PT) 3  -KR     Time Frame (Stairs Goal 1, PT) long term goal (LTG);3 days  -KR       Row Name 12/14/24 1033          Therapy Assessment/Plan (PT)    Planned Therapy Interventions (PT) balance training;bed mobility training;gait training;home exercise program;postural re-education;patient/family education;neuromuscular re-education;ROM (range of motion);stair training;strengthening;stretching;transfer training  -KR               User Key  (r) = Recorded By, (t) = Taken By, (c) =  Cosigned By      Initials Name Provider Type    Shital Ny, PT Physical Therapist                   Clinical Impression       Row Name 12/14/24 1032          Pain    Pretreatment Pain Rating 3/10  -KR     Posttreatment Pain Rating 3/10  -KR     Pain Location head  -KR     Pain Side/Orientation posterior  -KR     Pain Management Interventions positioning techniques utilized;exercise or physical activity utilized  -KR     Response to Pain Interventions no change per patient report  -KR       Row Name 12/14/24 1032          Plan of Care Review    Plan of Care Reviewed With patient  -KR     Outcome Evaluation Pt presents likely at recent functional baseline with mild strength, balance, and endurance deficits. Pt will benefit from PT to address these deficits. Rec home with assist upon dc.  -KR       Row Name 12/14/24 1032          Therapy Assessment/Plan (PT)    Patient/Family Therapy Goals Statement (PT) to go home  -KR     Rehab Potential (PT) good  -KR     Criteria for Skilled Interventions Met (PT) skilled treatment is necessary;yes  -KR     Therapy Frequency (PT) daily  -KR     Predicted Duration of Therapy Intervention (PT) 3 days  -KR       Row Name 12/14/24 1032          Vital Signs    Pre Systolic BP Rehab 191  -KR     Pre Treatment Diastolic BP 92  -KR     Post Systolic BP Rehab 200  -KR     Post Treatment Diastolic BP 73  -KR     Pre Patient Position Sitting  -KR     Intra Patient Position Standing  -KR     Post Patient Position Sitting  -KR       Row Name 12/14/24 1032          Positioning and Restraints    Pre-Treatment Position sitting in chair/recliner  -KR     Post Treatment Position chair  -KR     In Chair notified nsg;reclined;call light within reach;encouraged to call for assist;exit alarm on;waffle cushion;legs elevated  -KR               User Key  (r) = Recorded By, (t) = Taken By, (c) = Cosigned By      Initials Name Provider Type    Shital Ny, PT Physical Therapist                    Outcome Measures       Row Name 12/14/24 1034 12/13/24 8540       How much help from another person do you currently need...    Turning from your back to your side while in flat bed without using bedrails? 4  -KR 4  -KE    Moving from lying on back to sitting on the side of a flat bed without bedrails? 3  -KR 3  -KE    Moving to and from a bed to a chair (including a wheelchair)? 3  -KR 3  -KE    Standing up from a chair using your arms (e.g., wheelchair, bedside chair)? 3  -KR 3  -KE    Climbing 3-5 steps with a railing? 3  -KR 2  -KE    To walk in hospital room? 3  -KR 2  -KE    AM-PAC 6 Clicks Score (PT) 19  -KR 17  -KE    Highest Level of Mobility Goal 6 --> Walk 10 steps or more  -KR 5 --> Static standing  -KE      Row Name 12/14/24 1034          Modified [place] Scale    Pre-Stroke Modified [place] Scale 6 - Unable to determine (UTD) from the medical record documentation  -KR     Modified [place] Scale 1 - No significant disability despite symptoms.  Able to carry out all usual duties and activities.  -KR       Row Name 12/14/24 1034          Functional Assessment    Outcome Measure Options AM-PAC 6 Clicks Basic Mobility (PT);Modified [place]  -KR               User Key  (r) = Recorded By, (t) = Taken By, (c) = Cosigned By      Initials Name Provider Type    Mayi Mcfarlane, RN Registered Nurse    Shital Ny, PT Physical Therapist                                 Physical Therapy Education       Title: PT OT SLP Therapies (In Progress)       Topic: Physical Therapy (In Progress)       Point: Mobility training (Done)       Learning Progress Summary            Patient Acceptance, E, VU by CHRISTOPHER at 12/14/2024 1034                      Point: Home exercise program (Not Started)       Learner Progress:  Not documented in this visit.              Point: Body mechanics (Done)       Learning Progress Summary            Patient Acceptance, E, VU by CHRISTOPHER at 12/14/2024 1034                      Point: Precautions  (Done)       Learning Progress Summary            Patient Acceptance, E, VU by KR at 12/14/2024 1034                                      User Key       Initials Effective Dates Name Provider Type Discipline     12/30/22 -  Shital Goel PT Physical Therapist PT                  PT Recommendation and Plan  Planned Therapy Interventions (PT): balance training, bed mobility training, gait training, home exercise program, postural re-education, patient/family education, neuromuscular re-education, ROM (range of motion), stair training, strengthening, stretching, transfer training  Outcome Evaluation: Pt presents likely at recent functional baseline with mild strength, balance, and endurance deficits. Pt will benefit from PT to address these deficits. Rec home with assist upon dc.     Time Calculation:   PT Evaluation Complexity  History, PT Evaluation Complexity: 3 or more personal factors and/or comorbidities  Examination of Body Systems (PT Eval Complexity): total of 4 or more elements  Clinical Presentation (PT Evaluation Complexity): stable  Clinical Decision Making (PT Evaluation Complexity): low complexity  Overall Complexity (PT Evaluation Complexity): low complexity     PT Charges       Row Name 12/14/24 1035             Time Calculation    Start Time 1017  -KR      PT Received On 12/14/24  -KR      PT Goal Re-Cert Due Date 12/24/24  -KR         Untimed Charges    PT Eval/Re-eval Minutes 31  -KR         Total Minutes    Untimed Charges Total Minutes 31  -KR       Total Minutes 31  -KR                User Key  (r) = Recorded By, (t) = Taken By, (c) = Cosigned By      Initials Name Provider Type    Shital Ny PT Physical Therapist                  Therapy Charges for Today       Code Description Service Date Service Provider Modifiers Qty    82249469776 HC PT EVAL LOW COMPLEXITY 3 12/14/2024 Shital Goel PT GP 1            PT G-Codes  Outcome Measure Options: AM-PAC 6 Clicks Basic Mobility (PT),  Modified [place]  AM-PAC 6 Clicks Score (PT): 19  Modified [place] Scale: 1 - No significant disability despite symptoms.  Able to carry out all usual duties and activities.  PT Discharge Summary  Anticipated Discharge Disposition (PT): home with assist    Shital Goel, PT  12/14/2024

## 2024-12-14 NOTE — PLAN OF CARE
Goal Outcome Evaluation:  Plan of Care Reviewed With: patient           Outcome Evaluation: Pt presents likely at recent functional baseline with mild strength, balance, and endurance deficits. Pt will benefit from PT to address these deficits. Rec home with assist upon dc.    Anticipated Discharge Disposition (PT): home with assist

## 2024-12-14 NOTE — PLAN OF CARE
Goal Outcome Evaluation:                   Anticipated Discharge Disposition (SLP): home with assist

## 2024-12-14 NOTE — H&P
[facility]  HISTORY AND PHYSICAL    Patient Name: Chelsey Herrera  : 1950  MRN: [id number]  Primary Care Physician: Everardo Mccormack MD  Date of admission: 2024      Subjective   Subjective     Chief Complaint: JANE    HPI:  Chelsey Herrera is a 74 y.o. female flown in for word finding difficulty and HA. Symptoms began around 1400 this afternoon when she was on phone trying to get a medication refill.  said he was unable to understand anything she was saying at that time. On EMS arrival c/o terrible HA and . At time of my arrival says her speech is better but still with 5/10 HA. She and her  told me several times that she has no history of HTN despite being on metoprolol and despite her blood pressure being 160's systolic at her last visit here in .      Personal History     Past Medical History:   Diagnosis Date    Arthritis     Back pain     Bilateral kidney stones     Chronic fatigue     Chronic pain     Dental bridge present     Fibromyalgia     Hypertension     Hypothyroidism     Insomnia     Muscle weakness (generalized)     Palpitations     Persistent atrial fibrillation 2017    A) Diagnosed  by PCP when presenting with extreme fatigue B) CHADS-VASc = 3 (age, female, HTN)  C) 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 Changed to flecainide. F) ECV 16 on flecainide. Recurrence of afib about 24hrs later. F) TTE 10/20/16 bin    PONV (postoperative nausea and vomiting)     Sciatica of right side     SOB (shortness of breath)     Urination frequency     Wears eyeglasses     reading           Past Surgical History:   Procedure Laterality Date    CARDIAC CATHETERIZATION      NO STENTS    CARDIAC ELECTROPHYSIOLOGY PROCEDURE N/A 2017    Procedure: PVA. DNS meds.;   Surgeon: Miguelito Owusu DO;  Location:  [facility];  Service:     CARDIOVERSION      X 2    CARPAL TUNNEL RELEASE Bilateral     COLONOSCOPY      2013    ENDOSCOPY  05/2022    GALLBLADDER SURGERY      GASTRIC BYPASS      REPLACEMENT TOTAL KNEE BILATERAL Bilateral     SINUS SURGERY      X 2    TOTAL HIP ARTHROPLASTY Right 06/10/2021    Procedure: TOTAL HIP ARTHROPLASTY ANTERIOR MODIFIED RIGHT;  Surgeon: Silas Ravi MD;  [facility];  Service: Orthopedics;  Laterality: Right;    UTERINE SUSPENSION LAPAROSCOPIC         Family History: family history includes Diabetes in her brother; Heart attack in her father and mother; Heart disease in her brother; Kidney disease in her brother.     Social History:  reports that she has never smoked. She has never used smokeless tobacco. She reports that she does not drink alcohol and does not use drugs.  Social History     Social History Narrative    Patient consumes 2  servings coffee daily.     Patient lives at home with , Yash.            Medications:  Available home medication information reviewed.  DULoxetine, Diclofenac Sodium, HYDROcodone-acetaminophen, Lifitegrast, apixaban, cyclobenzaprine, flecainide, fluticasone, furosemide, levothyroxine, metoprolol tartrate, potassium chloride, and traZODone    Allergies   Allergen Reactions    Accupril [Quinapril Hcl] Cough    Cephalexin Itching    Ciprofloxacin Itching    Triamcinolone Other (See Comments)     Burning sensation       Objective   Objective     Vital Signs:   Temp:  [99.8 °F (37.7 °C)] 99.8 °F (37.7 °C)  Heart Rate:  [70] 70  Resp:  [18] 18  BP: (198)/(137) 198/137       Physical Exam   Constitutional: Awake, alert, up in bed  Eyes: PERRLA, sclerae anicteric, no conjunctival injection  HENT: NCAT, mucous membranes moist  Neck: Supple, no thyromegaly, no lymphadenopathy, trachea midline  Respiratory: Clear to auscultation bilaterally, nonlabored respirations   Cardiovascular: RRR, no  murmurs, rubs, or gallops, palpable pedal pulses bilaterally  Gastrointestinal: Positive bowel sounds, soft, nontender, nondistended, obese  Musculoskeletal: No bilateral ankle edema, no clubbing or cyanosis to extremities  Psychiatric: Appropriate affect, cooperative  Neurologic: Oriented x 3, strength symmetric in all extremities, Cranial Nerves grossly intact to confrontation, speech clear  Skin: No rashes     Result Review:  I have personally reviewed the results from the time of this admission to 12/13/2024 23:05 EST and agree with these findings:  []  Laboratory list / accordion  []  Microbiology  []  Radiology  []  EKG/Telemetry   []  Cardiology/Vascular   []  Pathology  []  Old records  []  Other:  Most notable findings include:       LAB RESULTS:      Lab 12/13/24 2126 12/13/24 2120   WBC  --  11.99*   HEMOGLOBIN  --  11.7*   HEMOGLOBIN, POC 12.2  --    HEMATOCRIT  --  35.8   HEMATOCRIT POC 36*  --    PLATELETS  --  250   NEUTROS ABS  --  9.74*   IMMATURE GRANS (ABS)  --  0.04   LYMPHS ABS  --  1.26   MONOS ABS  --  0.76   EOS ABS  --  0.10   MCV  --  89.9   PROTIME  --  13.9   INR  --  1.06   APTT  --  33.8         Lab 12/13/24 2126   CREATININE 0.70         Lab 12/13/24 2120   ALT (SGPT) 6   AST (SGOT) 15                         Microbiology Results (last 10 days)       ** No results found for the last 240 hours. **            XR Chest 1 View    Result Date: 12/13/2024  XR CHEST 1 VW Date of Exam: 12/13/2024 9:22 PM EST Indication: Acute Stroke Protocol (onset < 12 hrs) Comparison: None available. Findings: No acute infiltrate is seen. Cardiomegaly is noted. No effusion is seen.     Impression: Impression: Borderline cardiomegaly Electronically Signed: Tirso Prather MD  12/13/2024 9:57 PM EST  Workstation ID: [id number]    CT Angiogram Head w AI Analysis of LVO    Result Date: 12/13/2024  CT ANGIOGRAM HEAD W AI ANALYSIS OF LVO, CT ANGIOGRAM NECK Date of Exam: 12/13/2024 9:11 PM EST Indication: Neuro  Deficit, acute, Stroke suspected Neuro deficit, acute stroke suspected. Comparison: None available. Technique: CTA of the head and neck was performed after the uneventful intravenous administration of 115 cc Isovue-370. Reconstructed coronal and sagittal images were also obtained. In addition, a 3-D volume rendered image was created for interpretation.  Automated exposure control and iterative reconstruction methods were used. Findings: CTA NECK: *Aortic arch: No aneurysm. No significant stenosis or occlusion of the major arch vessels. *Left carotid system: Mild atherosclerotic disease at the bulb. No aneurysm, significant stenosis or occlusion. *Right carotid system: Mild atherosclerotic disease at the bulb. No aneurysm, significant stenosis or occlusion. *Vertebrobasilar system: The vertebral arteries arise from their respective subclavian arteries. Left vertebral artery is dominant. No aneurysm, significant stenosis or occlusion. CTA HEAD: *Anterior circulation: No aneurysm, significant stenosis or occlusion. *Posterior circulation: No aneurysm, significant stenosis or occlusion. *Anatomic variants: Fetal origin of the right PCA. *Venous sinuses: Patent.     Impression: 1.No hemodynamically significant stenosis, large vessel cut off or aneurysms in the intracranial circulation 2.No hemodynamically significant stenosis, dissection or aneurysms in the extracranial circulation. Electronically Signed: Mikal John MD  12/13/2024 9:44 PM EST  Workstation ID: [id number]    CT Angiogram Neck    Result Date: 12/13/2024  CT ANGIOGRAM HEAD W AI ANALYSIS OF LVO, CT ANGIOGRAM NECK Date of Exam: 12/13/2024 9:11 PM EST Indication: Neuro Deficit, acute, Stroke suspected Neuro deficit, acute stroke suspected. Comparison: None available. Technique: CTA of the head and neck was performed after the uneventful intravenous administration of 115 cc Isovue-370. Reconstructed coronal and sagittal images were also obtained. In addition, a  3-D volume rendered image was created for interpretation.  Automated exposure control and iterative reconstruction methods were used. Findings: CTA NECK: *Aortic arch: No aneurysm. No significant stenosis or occlusion of the major arch vessels. *Left carotid system: Mild atherosclerotic disease at the bulb. No aneurysm, significant stenosis or occlusion. *Right carotid system: Mild atherosclerotic disease at the bulb. No aneurysm, significant stenosis or occlusion. *Vertebrobasilar system: The vertebral arteries arise from their respective subclavian arteries. Left vertebral artery is dominant. No aneurysm, significant stenosis or occlusion. CTA HEAD: *Anterior circulation: No aneurysm, significant stenosis or occlusion. *Posterior circulation: No aneurysm, significant stenosis or occlusion. *Anatomic variants: Fetal origin of the right PCA. *Venous sinuses: Patent.     Impression: 1.No hemodynamically significant stenosis, large vessel cut off or aneurysms in the intracranial circulation 2.No hemodynamically significant stenosis, dissection or aneurysms in the extracranial circulation. Electronically Signed: Mikal John MD  12/13/2024 9:44 PM EST  Workstation ID: [id number]    CT CEREBRAL PERFUSION WITH & WITHOUT CONTRAST    Result Date: 12/13/2024  CT CEREBRAL PERFUSION W WO CONTRAST Date of Exam: 12/13/2024 9:11 PM EST Indication: Neuro Deficit, acute, Stroke suspected Neuro deficit, acute stroke suspected.  Comparison: None available. Technique: Axial CT images of the brain were obtained prior to and after the administration of 115 cc Isovue-370. Core blood volume, core blood flow, mean transit time, and Tmax images were obtained utilizing the Rapid software protocol. A limited CT angiogram of the head was also performed to measure the blood vessel density. The radiation dose reduction device was turned on for each scan per the ALARA (As Low as Reasonably Achievable) protocol. Findings: Cerebral blood flow,  blood volume, and mean transit time maps are symmetric without large perfusion defect.  CBF<30% volume: 0mL Tmax>6sec volume: 0 mL Mismatch volume: 0mL Mismatch ratio: None        Impression:  1. No evidence of core infarct nor ischemic brain at risk. Electronically Signed: Mikal John MD  12/13/2024 9:39 PM EST  Workstation ID: [id number]    CT Head Without Contrast Stroke Protocol    Result Date: 12/13/2024  CT HEAD WO CONTRAST STROKE PROTOCOL Date of Exam: 12/13/2024 9:05 PM EST Indication: Neuro deficit, acute, worst headache of life) Comparison: None available. Technique: Axial CT images were obtained of the head without contrast administration.  Reconstructed coronal images were also obtained. Automated exposure control and iterative construction methods were used. Scan Time: 2109 Results discussed with Rene stroke navigator at 2117. Findings: There is no CT evidence of acute hemorrhage, infarct, mass, mass effect, or midline shift. The gray-white matter differentiation is preserved. There is no hydrocephalus. The sulci and ventricles are symmetric.  No extraaxial fluid collections. The globes  and orbital contents are normal and symmetric. The mastoid air cells and visualized paranasal sinuses are clear. No skull fractures or suspicious calvarial lesions.     Impression: Impression: No acute intracranial abnormality. Electronically Signed: Salty Saini DO  12/13/2024 9:18 PM EST  Workstation ID: [id number]     Results for orders placed during the hospital encounter of 02/22/17    Adult Transesophageal Echo    Interpretation Summary  · Left atrial cavity size is moderately dilated.  · Left ventricular function is normal. Estimated EF = 55%.  · Mild mitral valve regurgitation is present  · Mild tricuspid valve regurgitation is present.  · Mild aortic valve regurgitation is present.  · No evidence of intracardiac thrombus or mass, clear left atrial appendage.      Assessment & Plan   Assessment & Plan        Hypertensive emergency    Persistent atrial fibrillation    Obesity, Class II, BMI 35-39.9    Fibromyalgia    HTN emergency r/o CVA  -stroke team following, imaging neg thus far. Mri, echo pending. Eliquis/statin  -holding her metoprolol and lasix for now - target bp 180-200.  -pt/ot/slp    Atrial fibrillation  -resume eliquis and flecainide.    Fibromyalgia  Obesity      VTE Prophylaxis:  Mechanical & pharmacologic VTE prophylaxis orders are signed & held.           CODE STATUS:    Code Status and Medical Interventions: CPR (Attempt to Resuscitate); Full Support   Ordered at: 12/13/24 5046     Code Status (Patient has no pulse and is not breathing):    CPR (Attempt to Resuscitate)     Medical Interventions (Patient has pulse or is breathing):    Full Support       Expected Discharge   Expected discharge date/ time has not been documented.     Brii Eagle II, DO  12/13/24

## 2024-12-14 NOTE — ED PROVIDER NOTES
"  [place]    EMERGENCY DEPARTMENT ENCOUNTER      Pt Name: Chelsey Herrera  MRN: [id number]  YOB: 1950  Date of evaluation: 12/13/2024  Provider: Joon Serna MD    CHIEF COMPLAINT       Chief Complaint   Patient presents with    Headache         HISTORY OF PRESENT ILLNESS   Chelsey Herrera is a 74 y.o. female who presents to the emergency department with complaint of severe headache.  Patient takes Eliquis.  Patient had some \"word salad\" per EMS.  Patient is slightly altered on arrival.      Nursing notes were reviewed.    REVIEW OF SYSTEMS     ROS:  A chief complaint appropriate review of systems was completed and is negative except as noted in the HPI.      PAST MEDICAL HISTORY     Past Medical History:   Diagnosis Date    Arthritis     Back pain     Bilateral kidney stones     Chronic fatigue     Chronic pain     Dental bridge present     Fibromyalgia     Hypertension     Hypothyroidism     Insomnia     Muscle weakness (generalized)     Palpitations     Persistent atrial fibrillation 1/23/2017    A) Diagnosed 2016 by PCP when presenting with extreme fatigue B) CHADS-VASc = 3 (age, female, HTN)  C) 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 Changed to flecainide. F) ECV 11/29/16 on flecainide. Recurrence of afib about 24hrs later. F) TTE 10/20/16 bin    PONV (postoperative nausea and vomiting)     Sciatica of right side     SOB (shortness of breath)     Urination frequency     Wears eyeglasses     reading         SURGICAL HISTORY       Past Surgical History:   Procedure Laterality Date    CARDIAC CATHETERIZATION      NO STENTS    CARDIAC ELECTROPHYSIOLOGY PROCEDURE N/A 02/22/2017    Procedure: PVA. DNS meds.;  Surgeon: Miguelito Owusu DO;  Location: [facility] INVASIVE LOCATION;  Service:     CARDIOVERSION      X 2    CARPAL TUNNEL RELEASE Bilateral     " COLONOSCOPY      2013    ENDOSCOPY  05/2022    GALLBLADDER SURGERY      GASTRIC BYPASS      REPLACEMENT TOTAL KNEE BILATERAL Bilateral     SINUS SURGERY      X 2    TOTAL HIP ARTHROPLASTY Right 06/10/2021    Procedure: TOTAL HIP ARTHROPLASTY ANTERIOR MODIFIED RIGHT;  Surgeon: Silas Ravi MD;  Location: [facility];  Service: Orthopedics;  Laterality: Right;    UTERINE SUSPENSION LAPAROSCOPIC           CURRENT MEDICATIONS     No current facility-administered medications for this encounter.    Current Outpatient Medications:     apixaban (ELIQUIS) 5 MG tablet tablet, Take 1 tablet by mouth Every 12 (Twelve) Hours. Take a dose of 2.5 mg every 12 hours till 6/16/21 evening dose, then resume 5 mg every 12 hours., Disp: 60 tablet, Rfl:     atorvastatin (LIPITOR) 80 MG tablet, Take 1 tablet by mouth Every Night for 30 days., Disp: 30 tablet, Rfl: 0    cyclobenzaprine (FLEXERIL) 10 MG tablet, Take 1 tablet by mouth At Night As Needed for Muscle Spasms., Disp: , Rfl:     Diclofenac Sodium (VOLTAREN) 1 % gel gel, APPLY TOPICALLY TO THE AFFECTED AREA FOUR TIMES DAILY AS DIRECTED, Disp: , Rfl:     DULoxetine (CYMBALTA) 30 MG capsule, Take 1 capsule by mouth Daily., Disp: , Rfl:     flecainide (TAMBOCOR) 50 MG tablet, TAKE 1 TABLET BY MOUTH EVERY 12 (TWELVE) HOURS., Disp: 180 tablet, Rfl: 3    fluticasone (FLONASE) 50 MCG/ACT nasal spray, 2 sprays by Each Nare route Daily., Disp: , Rfl:     furosemide (LASIX) 40 MG tablet, Take 1 tablet by mouth Daily As Needed (swelling)., Disp: , Rfl:     HYDROcodone-acetaminophen (NORCO) 7.5-325 MG per tablet, Take 1 tablet by mouth Every 6 (Six) Hours As Needed for Moderate Pain., Disp: , Rfl:     levothyroxine (SYNTHROID, LEVOTHROID) 50 MCG tablet, Take 1 tablet by mouth Daily., Disp: , Rfl:     losartan (COZAAR) 25 MG tablet, Take 1 tablet by mouth Daily for 30 days., Disp: 30 tablet, Rfl: 0    potassium chloride (K-DUR,KLOR-CON) 20 MEQ CR tablet, Take 1 tablet by mouth As Needed (with  "lasix)., Disp: , Rfl:     traZODone (DESYREL) 100 MG tablet, Take 1 tablet by mouth Daily., Disp: , Rfl:     Xiidra 5 % ophthalmic solution, , Disp: , Rfl:     metoprolol tartrate (LOPRESSOR) 50 MG tablet, TAKE 1 TABLET BY MOUTH TWICE A DAY, Disp: 180 tablet, Rfl: 0    ALLERGIES     Accupril [quinapril hcl], Cephalexin, Ciprofloxacin, and Triamcinolone    FAMILY HISTORY       Family History   Problem Relation Age of Onset    Heart attack Mother     Heart attack Father     Heart disease Brother     Diabetes Brother     Kidney disease Brother           SOCIAL HISTORY       Social History     Socioeconomic History    Marital status:    Tobacco Use    Smoking status: Never    Smokeless tobacco: Never   Vaping Use    Vaping status: Never Used   Substance and Sexual Activity    Alcohol use: No    Drug use: No    Sexual activity: Defer         PHYSICAL EXAM    (up to 7 for level 4, 8 or more for level 5)     Vitals:    12/15/24 0820 12/15/24 0826 12/15/24 1013 12/15/24 1115   BP: 164/91  164/91 133/78   BP Location:    Left arm   Patient Position:    Lying   Pulse: 109 111  103   Resp:    16   Temp:    98.8 °F (37.1 °C)   TempSrc:    Oral   SpO2:    95%   Weight:   88.5 kg (195 lb)    Height:   162.6 cm (64.02\")        General: Awake, alert, no acute distress.  HEENT: Conjunctivae normal.  Neck: Trachea midline.  Cardiac: Heart regular rate, rhythm, no murmurs, rubs, or gallops  Lungs: Lungs are clear to auscultation, there is no wheezing, rhonchi, or rales. There is no use of accessory muscles.  Chest wall: There is no tenderness to palpation over the chest wall or over ribs  Abdomen: Abdomen is soft, nontender, nondistended. There are no firm or pulsatile masses, no rebound rigidity or guarding.   Musculoskeletal: No deformity.  Neuro: Alert   Dermatology: Skin is warm and dry  Psych: Mentation is grossly normal, cognition is grossly normal. Affect is appropriate.        DIAGNOSTIC RESULTS     EKG: All EKGs are " interpreted by the Emergency Department Physician who either signs or Co-signs this chart in the absence of a cardiologist.    ECG 12 Lead ED Triage Standing Order; Acute Stroke (Onset <24 hrs)   Final Result   Test Reason : ED Triage Standing Order~   Blood Pressure :   */*   mmHG   Vent. Rate : 140 BPM     Atrial Rate : 192 BPM      P-R Int :   * ms          QRS Dur : 108 ms       QT Int : 322 ms       P-R-T Axes :   * -10 225 degrees     QTcB Int : 491 ms      Atrial fibrillation with rapid ventricular response   Incomplete left bundle branch block   Marked ST abnormality, possible inferolateral subendocardial injury   Abnormal ECG   When compared with ECG of 14-Dec-2024 14:05, (Unconfirmed)   Atrial fibrillation has replaced Sinus rhythm   Vent. rate has increased by  48 bpm   ST depression has replaced ST elevation in Inferior leads   ST more depressed in Anterolateral leads   T wave inversion more evident in Lateral leads   Confirmed by TERESITA ANNE ([id number]) on 12/15/2024 3:43:32 PM      Referred By: ZARI SHELBY           Confirmed By: TERESITA ANNE      ECG 12 Lead Rhythm Change   Final Result   Test Reason : Rhythm Change   Blood Pressure :   */*   mmHG   Vent. Rate :  92 BPM     Atrial Rate :  92 BPM      P-R Int : 206 ms          QRS Dur :  92 ms       QT Int : 422 ms       P-R-T Axes :  57  -3  23 degrees     QTcB Int : 521 ms      Sinus rhythm with frequent premature ventricular complexes and premature   atrial complexes   Left ventricular hypertrophy with repolarization abnormality   Cannot rule out Septal infarct Inferior injury pattern   ** ** ACUTE MI / STEMI ** **   Consider right ventricular involvement in acute inferior infarct   Abnormal ECG   When compared with ECG of 27-May-2021 13:28,   Significant changes have occurred   Confirmed by TERESITA ANNE ([id number]) on 12/15/2024 3:43:32 PM      Referred By: JUAN SHELBY           Confirmed By: TERESITA ANNE            RADIOLOGY:   [x] Radiologist's Report  Reviewed:  MRI Brain Without Contrast   Final Result   Impression:   1.No evidence of acute infarct.   2.Chronic changes as above.            Electronically Signed: Roddy Garcia MD     12/14/2024 9:48 AM EST     Workstation ID: [id number]      XR Chest 1 View   Final Result   Impression:   Borderline cardiomegaly         Electronically Signed: Tirso Prather MD     12/13/2024 9:57 PM EST     Workstation ID: [id number]      CT Angiogram Head w AI Analysis of LVO   Final Result   1.No hemodynamically significant stenosis, large vessel cut off or aneurysms in the intracranial circulation   2.No hemodynamically significant stenosis, dissection or aneurysms in the extracranial circulation.                Electronically Signed: Mikal John MD     12/13/2024 9:44 PM EST     Workstation ID: [id number]      CT Angiogram Neck   Final Result   1.No hemodynamically significant stenosis, large vessel cut off or aneurysms in the intracranial circulation   2.No hemodynamically significant stenosis, dissection or aneurysms in the extracranial circulation.                Electronically Signed: Mikal John MD     12/13/2024 9:44 PM EST     Workstation ID: [id number]      CT CEREBRAL PERFUSION WITH & WITHOUT CONTRAST   Final Result       1. No evidence of core infarct nor ischemic brain at risk.               Electronically Signed: Mikal John MD     12/13/2024 9:39 PM EST     Workstation ID: [id number]      CT Head Without Contrast Stroke Protocol   Final Result   Impression:   No acute intracranial abnormality.            Electronically Signed: Salty Saini DO     12/13/2024 9:18 PM EST     Workstation ID: [id number]          I ordered and independently reviewed the above noted radiographic studies.        LABS:    I have reviewed and interpreted all of the currently available lab results from this visit (if applicable):  Results for orders placed or performed during the hospital encounter of 12/13/24   Protime-INR    Collection  Time: 12/13/24  9:20 PM    Specimen: Blood   Result Value Ref Range    Protime 13.9 12.2 - 14.5 Seconds    INR 1.06 0.89 - 1.12   aPTT    Collection Time: 12/13/24  9:20 PM    Specimen: Blood   Result Value Ref Range    PTT 33.8 22.0 - 39.0 seconds   AST    Collection Time: 12/13/24  9:20 PM    Specimen: Blood   Result Value Ref Range    AST (SGOT) 15 1 - 32 U/L   ALT    Collection Time: 12/13/24  9:20 PM    Specimen: Blood   Result Value Ref Range    ALT (SGPT) 6 1 - 33 U/L   CBC Auto Differential    Collection Time: 12/13/24  9:20 PM    Specimen: Blood   Result Value Ref Range    WBC 11.99 (H) 3.40 - 10.80 10*3/mm3    RBC 3.98 3.77 - 5.28 10*6/mm3    Hemoglobin 11.7 (L) 12.0 - 15.9 g/dL    Hematocrit 35.8 34.0 - 46.6 %    MCV 89.9 79.0 - 97.0 fL    MCH 29.4 26.6 - 33.0 pg    MCHC 32.7 31.5 - 35.7 g/dL    RDW 13.2 12.3 - 15.4 %    RDW-SD 43.8 37.0 - 54.0 fl    MPV 9.7 6.0 - 12.0 fL    Platelets 250 140 - 450 10*3/mm3    Neutrophil % 81.3 (H) 42.7 - 76.0 %    Lymphocyte % 10.5 (L) 19.6 - 45.3 %    Monocyte % 6.3 5.0 - 12.0 %    Eosinophil % 0.8 0.3 - 6.2 %    Basophil % 0.8 0.0 - 1.5 %    Immature Grans % 0.3 0.0 - 0.5 %    Neutrophils, Absolute 9.74 (H) 1.70 - 7.00 10*3/mm3    Lymphocytes, Absolute 1.26 0.70 - 3.10 10*3/mm3    Monocytes, Absolute 0.76 0.10 - 0.90 10*3/mm3    Eosinophils, Absolute 0.10 0.00 - 0.40 10*3/mm3    Basophils, Absolute 0.09 0.00 - 0.20 10*3/mm3    Immature Grans, Absolute 0.04 0.00 - 0.05 10*3/mm3    nRBC 0.0 0.0 - 0.2 /100 WBC   Green Top (Gel)    Collection Time: 12/13/24  9:20 PM   Result Value Ref Range    Extra Tube Hold for add-ons.    Lavender Top    Collection Time: 12/13/24  9:20 PM   Result Value Ref Range    Extra Tube hold for add-on    Gold Top - SST    Collection Time: 12/13/24  9:20 PM   Result Value Ref Range    Extra Tube Hold for add-ons.    Gray Top    Collection Time: 12/13/24  9:20 PM   Result Value Ref Range    Extra Tube Hold for add-ons.    Light Blue Top     Collection Time: 12/13/24  9:20 PM   Result Value Ref Range    Extra Tube Hold for add-ons.    POC CHEM 8    Collection Time: 12/13/24  9:22 PM    Specimen: Blood   Result Value Ref Range    Glucose 137 (H) 70 - 130 mg/dL    BUN 13 8 - 26 mg/dL    Creatinine 0.70 0.60 - 1.30 mg/dL    Sodium 138 138 - 146 mmol/L    POC Potassium 3.9 3.5 - 4.9 mmol/L    Chloride 97 (L) 98 - 109 mmol/L    Total CO2 29 24 - 29 mmol/L    Hemoglobin 12.2 12.0 - 17.0 g/dL    Hematocrit 36 (L) 38 - 51 %    Ionized Calcium 1.08 (L) 1.20 - 1.32 mmol/L    eGFR 89.8 >60.0 mL/min/1.73   POC CHEM 8    Collection Time: 12/13/24  9:26 PM    Specimen: Blood   Result Value Ref Range    Sodium 138 138 - 146 mmol/L    POC Potassium 3.9 3.5 - 4.9 mmol/L    Chloride 97 (A) 98 - 109 mmol/L    Calcium, POC 1.08 mg/dL    Glucose 137 (A) 70 - 130 mg/dL    BUN 13 mg/dL    Creatinine 0.70 0.60 - 1.30 mg/dL    Hemoglobin 12.2 12.0 - 17.0 g/dL    Hematocrit 36 (A) 38 - 51 %   Urinalysis With Microscopic If Indicated (No Culture) - Urine, Clean Catch    Collection Time: 12/13/24 11:01 PM    Specimen: Urine, Clean Catch   Result Value Ref Range    Color, UA Yellow Yellow, Straw    Appearance, UA Clear Clear    pH, UA 7.5 5.0 - 8.0    Specific Gravity, UA 1.023 1.001 - 1.030    Glucose, UA Negative Negative    Ketones, UA Negative Negative    Bilirubin, UA Negative Negative    Blood, UA Negative Negative    Protein, UA Negative Negative    Leuk Esterase, UA Negative Negative    Nitrite, UA Negative Negative    Urobilinogen, UA 0.2 E.U./dL 0.2 - 1.0 E.U./dL   POC Glucose Once    Collection Time: 12/13/24 11:48 PM    Specimen: Blood   Result Value Ref Range    Glucose 177 (H) 70 - 130 mg/dL   POC Glucose Once    Collection Time: 12/14/24  6:11 AM    Specimen: Blood   Result Value Ref Range    Glucose 134 (H) 70 - 130 mg/dL   POC Glucose Once    Collection Time: 12/14/24 11:24 AM    Specimen: Blood   Result Value Ref Range    Glucose 173 (H) 70 - 130 mg/dL   ECG 12  Lead Rhythm Change    Collection Time: 12/14/24  2:05 PM   Result Value Ref Range    QT Interval 422 ms    QTC Interval 521 ms   ECG 12 Lead ED Triage Standing Order; Acute Stroke (Onset <24 hrs)    Collection Time: 12/14/24  2:38 PM   Result Value Ref Range    QT Interval 322 ms    QTC Interval 491 ms   POC Glucose Once    Collection Time: 12/14/24  2:39 PM    Specimen: Blood   Result Value Ref Range    Glucose 159 (H) 70 - 130 mg/dL   Hemoglobin A1c    Collection Time: 12/14/24  2:50 PM    Specimen: Blood   Result Value Ref Range    Hemoglobin A1C 6.00 (H) 4.80 - 5.60 %   Lipid Panel    Collection Time: 12/14/24  2:50 PM    Specimen: Blood   Result Value Ref Range    Total Cholesterol 176 0 - 200 mg/dL    Triglycerides 82 0 - 150 mg/dL    HDL Cholesterol 72 (H) 40 - 60 mg/dL    LDL Cholesterol  89 0 - 100 mg/dL    VLDL Cholesterol 15 5 - 40 mg/dL    LDL/HDL Ratio 1.22    Magnesium    Collection Time: 12/14/24  2:50 PM    Specimen: Blood   Result Value Ref Range    Magnesium 2.4 1.6 - 2.4 mg/dL   POC Glucose Once    Collection Time: 12/14/24  5:17 PM    Specimen: Blood   Result Value Ref Range    Glucose 171 (H) 70 - 130 mg/dL   POC Glucose Once    Collection Time: 12/15/24 12:22 AM    Specimen: Blood   Result Value Ref Range    Glucose 147 (H) 70 - 130 mg/dL   POC Glucose Once    Collection Time: 12/15/24  5:24 AM    Specimen: Blood   Result Value Ref Range    Glucose 116 70 - 130 mg/dL   Basic Metabolic Panel    Collection Time: 12/15/24  9:18 AM    Specimen: Blood   Result Value Ref Range    Glucose 166 (H) 65 - 99 mg/dL    BUN 10 8 - 23 mg/dL    Creatinine 0.71 0.57 - 1.00 mg/dL    Sodium 140 136 - 145 mmol/L    Potassium 3.7 3.5 - 5.2 mmol/L    Chloride 97 (L) 98 - 107 mmol/L    CO2 28.0 22.0 - 29.0 mmol/L    Calcium 8.9 8.6 - 10.5 mg/dL    BUN/Creatinine Ratio 14.1 7.0 - 25.0    Anion Gap 15.0 5.0 - 15.0 mmol/L    eGFR 88.2 >60.0 mL/min/1.73   Adult Transthoracic Echo Complete W/ Cont if Necessary Per  Protocol (With Agitated Saline)    Collection Time: 12/15/24 10:05 AM   Result Value Ref Range    EF(MOD-bp) 53.5 %    LVIDd 4.8 cm    LVIDs 3.5 cm    IVSd 1.10 cm    LVPWd 1.10 cm    FS 27.1 %    IVS/LVPW 1.00 cm    ESV(cubed) 42.9 ml    EDV(cubed) 110.6 ml    LV mass(C)d 194.0 grams    LVOT area 3.1 cm2    LVOT diam 2.00 cm    EDV(MOD-sp2) 83.6 ml    EDV(MOD-sp4) 109.0 ml    ESV(MOD-sp2) 38.1 ml    ESV(MOD-sp4) 50.7 ml    SV(MOD-sp2) 45.5 ml    SV(MOD-sp4) 58.3 ml    EF(MOD-sp2) 54.4 %    EF(MOD-sp4) 53.5 %    MV E max joaquin 63.6 cm/sec    MV A max joaquin 35.7 cm/sec    MV dec time 0.13 sec    MV E/A 1.78     LA ESV Index (BP) 40.3 ml/m2    Med Peak E' Joaquin 6.3 cm/sec    Lat Peak E' Joaquin 7.9 cm/sec    Avg E/e' ratio 8.96     SV(LVOT) 45.4 ml    RV Base 3.0 cm    RV Mid 2.00 cm    RV Length 6.2 cm    TAPSE (>1.6) 1.92 cm    RV S' 12.5 cm/sec    LA dimension (2D)  4.2 cm    LV V1 max 85.5 cm/sec    LV V1 max PG 2.9 mmHg    LV V1 mean PG 1.75 mmHg    LV V1 VTI 14.5 cm    Ao pk joaquin 154.8 cm/sec    Ao max PG 9.6 mmHg    Ao mean PG 5.0 mmHg    Ao V2 VTI 24.4 cm    MERI(I,D) 1.86 cm2    AI P1/2t 577.1 msec    MV max PG 3.7 mmHg    MV mean PG 1.00 mmHg    MV V2 VTI 19.1 cm    MV P1/2t 41.5 msec    MVA(P1/2t) 5.3 cm2    MVA(VTI) 2.38 cm2    MV dec slope 640.0 cm/sec2    PA V2 max 80.6 cm/sec    PA acc time 0.08 sec    Ao root diam 3.3 cm    BH CV ECHO SHUNT ASSESSMENT PERFORMED (HIDDEN SCRIPTING) 1     BH CV VAS BP LEFT /91 mmHg    Ascending aorta 3.3 cm    Echo EF Estimated 50.0 %   POC Glucose Once    Collection Time: 12/15/24 11:33 AM    Specimen: Blood   Result Value Ref Range    Glucose 228 (H) 70 - 130 mg/dL        If labs were ordered, I independently reviewed the results and considered them in treating the patient.      EMERGENCY DEPARTMENT COURSE and DIFFERENTIAL DIAGNOSIS/MDM:   Vitals:  AS OF 20:51 EST    BP - 133/78  HR - 103  TEMP - 98.8 °F (37.1 °C) (Oral)  O2 SATS - 95%        Discussion below represents my  analysis of pertinent findings related to patient's condition, differential diagnosis, treatment plan and final disposition.      Differential diagnosis:  The differential diagnosis associated with the patient's presentation includes: CVA, intracranial hemorrhage, cranial mass, migraine, seizure, hypertensive emergency      Independent interpretations (ECG/rhythm strip/X-ray/US/CT scan): I independently interpreted the patient's head CT and cardiac monitor.  No evidence of ICH and patient is in atrial fibrillation      Additional sources:  Discussed/obtained information from independent historians:   [] Spouse:   [] Parent:   [] Friend:   [x] EMS: Report was taken from EMS.  Vital signs stable during transport.   [] Other:  External (non-ED) record review:   [] Inpatient record:   [] Office record:   [] Outpatient record:   [] Prior Outpatient labs:   [] Prior Outpatient radiology:   [] Primary Care record:   [] Outside ED record:   [] Other:       Patient's care impacted by:   [] Diabetes   [x] Hypertension   [] Coronary Artery Disease   [] Cancer   [] Other:     Care significantly affected by Social Determinants of Health (housing and economic circumstances, unemployment)    [] Yes     [x] No   If yes, Patient's care significantly limited by  Social Determinants of Health including:    [] Inadequate housing    [] Low income    [] Alcoholism and drug addiction in family    [] Problems related to primary support group    [] Unemployment    [] Problems related to employment    [] Other Social Determinants of Health:       Consideration of admission/observation vs discharge: Patient presents with neurologic symptoms associated with severe hypertension and warrants admission for further management      I considered prescription management with:    [] Pain medication:   [] Antiviral:   [] Antibiotic:   [x] Other: Considered TNK, or patient is on Eliquis and therefore is contraindicated    ED Course:    ED Course as of  12/17/24 2051   Fri Dec 13, 2024   2235 Patient presents with stroke versus hypertensive emergency. Patient began having headache this evening along with some difficulty with speech. She was a fly in from [facility] as a stroke alert. No ongoing neurologic deficits on arrival but has been persistently hypertensive to around 220/110. Initial CT imaging looks okay and stroke team is following. She is on Eliquis for A-fib and is not a TNK candidate. [NS]   2235 I discussed case with hospitalist Dr. Eagle.  Discussed history, presentation, workup.  Accepts patient for admission. [NS]      ED Course User Index  [NS] Joon Serna MD         PROCEDURES:  Procedures    CRITICAL CARE TIME        FINAL IMPRESSION      1. Acute nonintractable headache, unspecified headache type    2. Difficulty with speech    3. Hypertensive emergency    4. Cognitive communication deficit          DISPOSITION/PLAN     ED Disposition       ED Disposition   Decision to Admit    Condition   --    Comment   Level of Care: Telemetry [5]   Diagnosis: Hypertensive emergency [[id number]]   Bed Request Comments: stroke r/o                   Comment: Please note this report has been produced using speech recognition software.      Joon Serna MD  Attending Emergency Physician             Joon Serna MD  12/17/24 2053

## 2024-12-15 ENCOUNTER — APPOINTMENT (OUTPATIENT)
Dept: CARDIOLOGY | Facility: HOSPITAL | Age: 74
DRG: 305 | End: 2024-12-15
Payer: MEDICARE

## 2024-12-15 VITALS
WEIGHT: 195 LBS | DIASTOLIC BLOOD PRESSURE: 78 MMHG | HEIGHT: 64 IN | RESPIRATION RATE: 16 BRPM | OXYGEN SATURATION: 95 % | SYSTOLIC BLOOD PRESSURE: 133 MMHG | TEMPERATURE: 98.8 F | HEART RATE: 103 BPM | BODY MASS INDEX: 33.29 KG/M2

## 2024-12-15 PROBLEM — I16.1 HYPERTENSIVE EMERGENCY: Status: RESOLVED | Noted: 2024-12-13 | Resolved: 2024-12-15

## 2024-12-15 PROBLEM — E66.812 OBESITY, CLASS II, BMI 35-39.9: Status: RESOLVED | Noted: 2017-02-17 | Resolved: 2024-12-15

## 2024-12-15 LAB
ANION GAP SERPL CALCULATED.3IONS-SCNC: 15 MMOL/L (ref 5–15)
ASCENDING AORTA: 3.3 CM
BH CV ECHO MEAS - AI P1/2T: 577.1 MSEC
BH CV ECHO MEAS - AO MAX PG: 9.6 MMHG
BH CV ECHO MEAS - AO MEAN PG: 5 MMHG
BH CV ECHO MEAS - AO ROOT DIAM: 3.3 CM
BH CV ECHO MEAS - AO V2 MAX: 154.8 CM/SEC
BH CV ECHO MEAS - AO V2 VTI: 24.4 CM
BH CV ECHO MEAS - AVA(I,D): 1.86 CM2
BH CV ECHO MEAS - EDV(CUBED): 110.6 ML
BH CV ECHO MEAS - EDV(MOD-SP2): 83.6 ML
BH CV ECHO MEAS - EDV(MOD-SP4): 109 ML
BH CV ECHO MEAS - EF(MOD-BP): 53.5 %
BH CV ECHO MEAS - EF(MOD-SP2): 54.4 %
BH CV ECHO MEAS - EF(MOD-SP4): 53.5 %
BH CV ECHO MEAS - ESV(CUBED): 42.9 ML
BH CV ECHO MEAS - ESV(MOD-SP2): 38.1 ML
BH CV ECHO MEAS - ESV(MOD-SP4): 50.7 ML
BH CV ECHO MEAS - FS: 27.1 %
BH CV ECHO MEAS - IVS/LVPW: 1 CM
BH CV ECHO MEAS - IVSD: 1.1 CM
BH CV ECHO MEAS - LA DIMENSION: 4.2 CM
BH CV ECHO MEAS - LAT PEAK E' VEL: 7.9 CM/SEC
BH CV ECHO MEAS - LV MASS(C)D: 194 GRAMS
BH CV ECHO MEAS - LV MAX PG: 2.9 MMHG
BH CV ECHO MEAS - LV MEAN PG: 1.75 MMHG
BH CV ECHO MEAS - LV V1 MAX: 85.5 CM/SEC
BH CV ECHO MEAS - LV V1 VTI: 14.5 CM
BH CV ECHO MEAS - LVIDD: 4.8 CM
BH CV ECHO MEAS - LVIDS: 3.5 CM
BH CV ECHO MEAS - LVOT AREA: 3.1 CM2
BH CV ECHO MEAS - LVOT DIAM: 2 CM
BH CV ECHO MEAS - LVPWD: 1.1 CM
BH CV ECHO MEAS - MED PEAK E' VEL: 6.3 CM/SEC
BH CV ECHO MEAS - MV A MAX VEL: 35.7 CM/SEC
BH CV ECHO MEAS - MV DEC SLOPE: 640 CM/SEC2
BH CV ECHO MEAS - MV DEC TIME: 0.13 SEC
BH CV ECHO MEAS - MV E MAX VEL: 63.6 CM/SEC
BH CV ECHO MEAS - MV E/A: 1.78
BH CV ECHO MEAS - MV MAX PG: 3.7 MMHG
BH CV ECHO MEAS - MV MEAN PG: 1 MMHG
BH CV ECHO MEAS - MV P1/2T: 41.5 MSEC
BH CV ECHO MEAS - MV V2 VTI: 19.1 CM
BH CV ECHO MEAS - MVA(P1/2T): 5.3 CM2
BH CV ECHO MEAS - MVA(VTI): 2.38 CM2
BH CV ECHO MEAS - PA ACC TIME: 0.08 SEC
BH CV ECHO MEAS - PA V2 MAX: 80.6 CM/SEC
BH CV ECHO MEAS - SV(LVOT): 45.4 ML
BH CV ECHO MEAS - SV(MOD-SP2): 45.5 ML
BH CV ECHO MEAS - SV(MOD-SP4): 58.3 ML
BH CV ECHO MEAS - TAPSE (>1.6): 1.92 CM
BH CV ECHO MEASUREMENTS AVERAGE E/E' RATIO: 8.96
BH CV ECHO SHUNT ASSESSMENT PERFORMED (HIDDEN SCRIPTING): 1
BH CV VAS BP LEFT ARM: NORMAL MMHG
BH CV XLRA - RV BASE: 3 CM
BH CV XLRA - RV LENGTH: 6.2 CM
BH CV XLRA - RV MID: 2 CM
BH CV XLRA - TDI S': 12.5 CM/SEC
BUN SERPL-MCNC: 10 MG/DL (ref 8–23)
BUN/CREAT SERPL: 14.1 (ref 7–25)
CALCIUM SPEC-SCNC: 8.9 MG/DL (ref 8.6–10.5)
CHLORIDE SERPL-SCNC: 97 MMOL/L (ref 98–107)
CO2 SERPL-SCNC: 28 MMOL/L (ref 22–29)
CREAT SERPL-MCNC: 0.71 MG/DL (ref 0.57–1)
EGFRCR SERPLBLD CKD-EPI 2021: 88.2 ML/MIN/1.73
GLUCOSE BLDC GLUCOMTR-MCNC: 116 MG/DL (ref 70–130)
GLUCOSE BLDC GLUCOMTR-MCNC: 147 MG/DL (ref 70–130)
GLUCOSE BLDC GLUCOMTR-MCNC: 228 MG/DL (ref 70–130)
GLUCOSE SERPL-MCNC: 166 MG/DL (ref 65–99)
LEFT ATRIUM VOLUME INDEX: 40.3 ML/M2
LV EF 2D ECHO EST: 50 %
POTASSIUM SERPL-SCNC: 3.7 MMOL/L (ref 3.5–5.2)
QT INTERVAL: 322 MS
QT INTERVAL: 422 MS
QTC INTERVAL: 491 MS
QTC INTERVAL: 521 MS
SODIUM SERPL-SCNC: 140 MMOL/L (ref 136–145)

## 2024-12-15 PROCEDURE — 25010000002 SULFUR HEXAFLUORIDE MICROSPH 60.7-25 MG RECONSTITUTED SUSPENSION

## 2024-12-15 PROCEDURE — 80048 BASIC METABOLIC PNL TOTAL CA: CPT | Performed by: STUDENT IN AN ORGANIZED HEALTH CARE EDUCATION/TRAINING PROGRAM

## 2024-12-15 PROCEDURE — 93306 TTE W/DOPPLER COMPLETE: CPT

## 2024-12-15 PROCEDURE — 99239 HOSP IP/OBS DSCHRG MGMT >30: CPT | Performed by: STUDENT IN AN ORGANIZED HEALTH CARE EDUCATION/TRAINING PROGRAM

## 2024-12-15 PROCEDURE — 82948 REAGENT STRIP/BLOOD GLUCOSE: CPT

## 2024-12-15 RX ORDER — ATORVASTATIN CALCIUM 80 MG/1
80 TABLET, FILM COATED ORAL NIGHTLY
Qty: 30 TABLET | Refills: 0 | Status: SHIPPED | OUTPATIENT
Start: 2024-12-15 | End: 2024-12-15

## 2024-12-15 RX ORDER — LOSARTAN POTASSIUM 25 MG/1
25 TABLET ORAL
Qty: 30 TABLET | Refills: 0 | Status: SHIPPED | OUTPATIENT
Start: 2024-12-16 | End: 2025-01-15

## 2024-12-15 RX ORDER — ATORVASTATIN CALCIUM 80 MG/1
80 TABLET, FILM COATED ORAL NIGHTLY
Qty: 30 TABLET | Refills: 0 | Status: SHIPPED | OUTPATIENT
Start: 2024-12-15 | End: 2025-01-14

## 2024-12-15 RX ORDER — LOSARTAN POTASSIUM 25 MG/1
25 TABLET ORAL
Qty: 30 TABLET | Refills: 0 | Status: SHIPPED | OUTPATIENT
Start: 2024-12-16 | End: 2024-12-15

## 2024-12-15 RX ADMIN — Medication 10 ML: at 08:20

## 2024-12-15 RX ADMIN — APIXABAN 5 MG: 5 TABLET, FILM COATED ORAL at 08:20

## 2024-12-15 RX ADMIN — DULOXETINE HYDROCHLORIDE 30 MG: 30 CAPSULE, DELAYED RELEASE ORAL at 08:20

## 2024-12-15 RX ADMIN — METOPROLOL TARTRATE 50 MG: 50 TABLET, FILM COATED ORAL at 08:20

## 2024-12-15 RX ADMIN — HYDROCODONE BITARTRATE AND ACETAMINOPHEN 1 TABLET: 7.5; 325 TABLET ORAL at 11:18

## 2024-12-15 RX ADMIN — FLECAINIDE ACETATE 50 MG: 50 TABLET ORAL at 08:20

## 2024-12-15 RX ADMIN — SULFUR HEXAFLUORIDE 2 ML: KIT at 10:14

## 2024-12-15 RX ADMIN — LEVOTHYROXINE SODIUM 50 MCG: 0.05 TABLET ORAL at 05:26

## 2024-12-15 RX ADMIN — FLUTICASONE PROPIONATE 2 SPRAY: 50 SPRAY, METERED NASAL at 08:20

## 2024-12-15 RX ADMIN — LOSARTAN POTASSIUM 25 MG: 25 TABLET, FILM COATED ORAL at 08:20

## 2024-12-15 NOTE — NURSING NOTE
AX04; ST-NSR; RA; no complaints of pain or concerns    Tele box removed, cleaned, and placed in proper placement.    Iv removed per order and intact.    Discharge teaching completed w/ pt and spouse at bedside.    Pt and spouse aware to make PCP appointment and [facility] will call to set up appointment.

## 2024-12-15 NOTE — DISCHARGE SUMMARY
[facility] Medicine Services  DISCHARGE SUMMARY    Patient Name: Chelsey Herrera  : 1950  MRN: [id number]    Date of Admission: 2024  9:10 PM  Date of Discharge:  12/15/24  Primary Care Physician: Everardo Mccormack MD    Consults       Date and Time Order Name Status Description    2024  9:10 PM Inpatient Neurology Consult Stroke Completed             Hospital Course     Presenting Problem: Headache, aphasia    Active Hospital Problems    Diagnosis  POA    Fibromyalgia [M79.7]  Yes    Persistent atrial fibrillation [I48.19]  Yes      Resolved Hospital Problems    Diagnosis Date Resolved POA    **Hypertensive emergency [I16.1] 12/15/2024 Yes    Obesity, Class II, BMI 35-39.9 [E66.812] 12/15/2024 Yes          Hospital Course:  Chelsey Herrera is a 74 y.o. female with past medical history of A-fib on anticoagulation, fibromyalgia, hypothyroidism, mood disorder presenting with headache and aphasia.  Patient called EMS and was found to be hypertensive with systolic blood pressure in the 210s.  Patient is not on any home antihypertensives.  Stroke neurology was consulted upon presentation.  CT head, CTA head and neck, CT perfusion without acute infarct, MRI brain without acute infarct.  Symptoms likely secondary to hypertensive emergency.  Patient was started on Cardene drip with normalization of blood pressure.  Patient was started on losartan 25 mg daily with normalization of blood pressure.  Encourage patient to take blood pressure 1-2 times per day for the next week and follow-up with primary care physician for continued hypertension management.  Keep blood pressure log.  Encouraged healthy eating and healthy activity.  Follow-up with cardiology as scheduled.     Hypertensive emergency  -Systolic blood pressures in 210s prior to arrival  -No antihypertensive medications at home, though allergy listed to Accupril (cough)  -Presented with headache and aphasia, though  both have resolved completely  -CT head, CTA head and neck, CT perfusion, MRI brain without acute infarct  -Echo with EF of 50 to 55%, borderline concentric hypertrophy, grade 2 diastolic dysfunction with pseudonormalization, negative saline test  -Stroke neurology consulted.  Recommended increased dose of atorvastatin to 80 mg nightly.  -Status post Cardene drip  -Continue losartan 25 mg daily  -Keep blood pressure log and monitor blood pressure 1-2 times per day.  Follow-up with primary care physician for repeat BMP for continued hypertension management.  Encouraged healthy eating and healthy activity  -Follow-up with cardiology as scheduled  -PT/OT recommend home with assist     Leukocytosis  - Likely reactive   - No signs/symptoms of infection      A-fib  -Continue home Eliquis and flecainide     Mood disorder  -Continue home duloxetine and trazodone     Hypothyroidism  -Continue home levothyroxine      Discharge Follow Up Recommendations for outpatient labs/diagnostics:  Continue losartan 25 mg daily.  Keep blood pressure log and monitor blood pressure 1-2 times daily prior to follow-up with primary care physician.  Notify primary care physician if systolic blood pressure sustained over 140.  Follow-up with PCP in 1 week for repeat BMP and further hypertension management.  Follow-up with cardiology as scheduled.  Encouraged healthy eating and healthy activity    Day of Discharge     HPI:   Patient seen resting comfortably in bed no acute distress.  Blood pressure and heart rate currently well-controlled.  Patient denies any chest pain, shortness of breath, palpitations.  Plan, as documented above, discussed with patient.  All questions answered.        Vital Signs:   Temp:  [97.7 °F (36.5 °C)-98.8 °F (37.1 °C)] 98.8 °F (37.1 °C)  Heart Rate:  [0-138] 103  Resp:  [16-18] 16  BP: (103-172)/() 133/78    Physical Exam  Constitutional:       General: She is not in acute distress.  Eyes:      Extraocular  Movements: Extraocular movements intact.      Pupils: Pupils are equal, round, and reactive to light.   Cardiovascular:      Rate and Rhythm: Tachycardia, irregular     Pulses: Normal pulses.   Pulmonary:      Effort: Pulmonary effort is normal. No respiratory distress.   Abdominal:      General: There is no distension.      Palpations: Abdomen is soft.      Tenderness: There is no abdominal tenderness. There is no guarding.   Musculoskeletal:         General: Normal range of motion.      Right lower leg: No edema.      Left lower leg: No edema.   Skin:     General: Skin is warm.   Neurological:      General: No focal deficit present.      Mental Status: She is alert and oriented to person, place, and time.      Cranial Nerves: No cranial nerve deficit.      Sensory: No sensory deficit.      Motor: No weakness.   Psychiatric:         Mood and Affect: Mood normal.         Behavior: Behavior normal.     Pertinent  and/or Most Recent Results     LAB RESULTS:      Lab 12/13/24 2126 12/13/24 2122 12/13/24 2120   WBC  --   --  11.99*   HEMOGLOBIN  --   --  11.7*   HEMOGLOBIN, POC 12.2 12.2  --    HEMATOCRIT  --   --  35.8   HEMATOCRIT POC 36* 36*  --    PLATELETS  --   --  250   NEUTROS ABS  --   --  9.74*   IMMATURE GRANS (ABS)  --   --  0.04   LYMPHS ABS  --   --  1.26   MONOS ABS  --   --  0.76   EOS ABS  --   --  0.10   MCV  --   --  89.9   PROTIME  --   --  13.9   APTT  --   --  33.8         Lab 12/15/24  0918 12/14/24  1450 12/13/24 2126 12/13/24 2122   SODIUM 140  --   --   --    POTASSIUM 3.7  --   --   --    CHLORIDE 97*  --   --   --    CO2 28.0  --   --   --    ANION GAP 15.0  --   --   --    BUN 10  --   --   --    CREATININE 0.71  --  0.70 0.70   EGFR 88.2  --   --  89.8   GLUCOSE 166*  --   --   --    CALCIUM 8.9  --   --   --    MAGNESIUM  --  2.4  --   --    HEMOGLOBIN A1C  --  6.00*  --   --          Lab 12/13/24 2120   ALT (SGPT) 6   AST (SGOT) 15         Lab 12/13/24 2120   PROTIME 13.9   INR  1.06         Lab 12/14/24  1450   CHOLESTEROL 176   LDL CHOL 89   HDL CHOL 72*   TRIGLYCERIDES 82             Brief Urine Lab Results  (Last result in the past 365 days)        Color   Clarity   Blood   Leuk Est   Nitrite   Protein   CREAT   Urine HCG        12/13/24 2301 Yellow   Clear   Negative   Negative   Negative   Negative                 Microbiology Results (last 10 days)       ** No results found for the last 240 hours. **            MRI Brain Without Contrast    Result Date: 12/14/2024  MRI BRAIN WO CONTRAST Date of Exam: 12/14/2024 4:22 AM EST Indication: Stroke, follow up Headache with mild expressive aphasia, acute stroke suspected.  Comparison: Stroke CT 12/13/2024 Technique:  Routine multiplanar/multisequence sequence images of the brain were obtained without contrast administration. Findings: Parenchyma: No evidence of infarct. No evidence of parenchymal hemorrhage. Few areas of punctate susceptibility may reflect prior microhemorrhage. Scattered periventricular and subcortical FLAIR hyperintensities are nonspecific but most often associated with chronic small vessel ischemic changes. Mild parenchymal volume loss. Midline structures appear intact. No midline shift or herniation. Ventricles and extra-axial spaces: Prominent ventricles sulci secondary to volume loss. No extra-axial fluid collection seen. Other: Calvarial marrow signal is intact. Lens replacements. Scattered paranasal sinus mucosal thickening. Mastoid air cells are clear. Major intracranial arterial flow voids appear grossly intact.     Impression: 1.No evidence of acute infarct. 2.Chronic changes as above. Electronically Signed: Roddy Garcia MD  12/14/2024 9:48 AM EST  Workstation ID: [id number]    XR Chest 1 View    Result Date: 12/13/2024  XR CHEST 1 VW Date of Exam: 12/13/2024 9:22 PM EST Indication: Acute Stroke Protocol (onset < 12 hrs) Comparison: None available. Findings: No acute infiltrate is seen. Cardiomegaly is noted.  No effusion is seen.     Impression: Borderline cardiomegaly Electronically Signed: Tirso Prather MD  12/13/2024 9:57 PM EST  Workstation ID: [id number]    CT Angiogram Head w AI Analysis of LVO    Result Date: 12/13/2024  CT ANGIOGRAM HEAD W AI ANALYSIS OF LVO, CT ANGIOGRAM NECK Date of Exam: 12/13/2024 9:11 PM EST Indication: Neuro Deficit, acute, Stroke suspected Neuro deficit, acute stroke suspected. Comparison: None available. Technique: CTA of the head and neck was performed after the uneventful intravenous administration of 115 cc Isovue-370. Reconstructed coronal and sagittal images were also obtained. In addition, a 3-D volume rendered image was created for interpretation.  Automated exposure control and iterative reconstruction methods were used. Findings: CTA NECK: *Aortic arch: No aneurysm. No significant stenosis or occlusion of the major arch vessels. *Left carotid system: Mild atherosclerotic disease at the bulb. No aneurysm, significant stenosis or occlusion. *Right carotid system: Mild atherosclerotic disease at the bulb. No aneurysm, significant stenosis or occlusion. *Vertebrobasilar system: The vertebral arteries arise from their respective subclavian arteries. Left vertebral artery is dominant. No aneurysm, significant stenosis or occlusion. CTA HEAD: *Anterior circulation: No aneurysm, significant stenosis or occlusion. *Posterior circulation: No aneurysm, significant stenosis or occlusion. *Anatomic variants: Fetal origin of the right PCA. *Venous sinuses: Patent.     1.No hemodynamically significant stenosis, large vessel cut off or aneurysms in the intracranial circulation 2.No hemodynamically significant stenosis, dissection or aneurysms in the extracranial circulation. Electronically Signed: Mikal John MD  12/13/2024 9:44 PM EST  Workstation ID: [id number]    CT Angiogram Neck    Result Date: 12/13/2024  CT ANGIOGRAM HEAD W AI ANALYSIS OF LVO, CT ANGIOGRAM NECK Date of Exam: 12/13/2024 9:11  PM EST Indication: Neuro Deficit, acute, Stroke suspected Neuro deficit, acute stroke suspected. Comparison: None available. Technique: CTA of the head and neck was performed after the uneventful intravenous administration of 115 cc Isovue-370. Reconstructed coronal and sagittal images were also obtained. In addition, a 3-D volume rendered image was created for interpretation.  Automated exposure control and iterative reconstruction methods were used. Findings: CTA NECK: *Aortic arch: No aneurysm. No significant stenosis or occlusion of the major arch vessels. *Left carotid system: Mild atherosclerotic disease at the bulb. No aneurysm, significant stenosis or occlusion. *Right carotid system: Mild atherosclerotic disease at the bulb. No aneurysm, significant stenosis or occlusion. *Vertebrobasilar system: The vertebral arteries arise from their respective subclavian arteries. Left vertebral artery is dominant. No aneurysm, significant stenosis or occlusion. CTA HEAD: *Anterior circulation: No aneurysm, significant stenosis or occlusion. *Posterior circulation: No aneurysm, significant stenosis or occlusion. *Anatomic variants: Fetal origin of the right PCA. *Venous sinuses: Patent.     1.No hemodynamically significant stenosis, large vessel cut off or aneurysms in the intracranial circulation 2.No hemodynamically significant stenosis, dissection or aneurysms in the extracranial circulation. Electronically Signed: Mikal John MD  12/13/2024 9:44 PM EST  Workstation ID: [id number]    CT CEREBRAL PERFUSION WITH & WITHOUT CONTRAST    Result Date: 12/13/2024  CT CEREBRAL PERFUSION W WO CONTRAST Date of Exam: 12/13/2024 9:11 PM EST Indication: Neuro Deficit, acute, Stroke suspected Neuro deficit, acute stroke suspected.  Comparison: None available. Technique: Axial CT images of the brain were obtained prior to and after the administration of 115 cc Isovue-370. Core blood volume, core blood flow, mean transit time, and  Tmax images were obtained utilizing the Rapid software protocol. A limited CT angiogram of the head was also performed to measure the blood vessel density. The radiation dose reduction device was turned on for each scan per the ALARA (As Low as Reasonably Achievable) protocol. Findings: Cerebral blood flow, blood volume, and mean transit time maps are symmetric without large perfusion defect.  CBF<30% volume: 0mL Tmax>6sec volume: 0 mL Mismatch volume: 0mL Mismatch ratio: None         1. No evidence of core infarct nor ischemic brain at risk. Electronically Signed: Mikal John MD  12/13/2024 9:39 PM EST  Workstation ID: [id number]    CT Head Without Contrast Stroke Protocol    Result Date: 12/13/2024  CT HEAD WO CONTRAST STROKE PROTOCOL Date of Exam: 12/13/2024 9:05 PM EST Indication: Neuro deficit, acute, worst headache of life) Comparison: None available. Technique: Axial CT images were obtained of the head without contrast administration.  Reconstructed coronal images were also obtained. Automated exposure control and iterative construction methods were used. Scan Time: 2109 Results discussed with Rene stroke navigator at 2117. Findings: There is no CT evidence of acute hemorrhage, infarct, mass, mass effect, or midline shift. The gray-white matter differentiation is preserved. There is no hydrocephalus. The sulci and ventricles are symmetric.  No extraaxial fluid collections. The globes  and orbital contents are normal and symmetric. The mastoid air cells and visualized paranasal sinuses are clear. No skull fractures or suspicious calvarial lesions.     Impression: No acute intracranial abnormality. Electronically Signed: Salty Saini DO  12/13/2024 9:18 PM EST  Workstation ID: [id number]             Results for orders placed during the hospital encounter of 12/13/24    Adult Transthoracic Echo Complete W/ Cont if Necessary Per Protocol (With Agitated Saline)    Interpretation Summary    Left ventricular  systolic function is normal. Estimated left ventricular EF = 50-55%    Left ventricular wall thickness is consistent with borderline concentric hypertrophy.    Left ventricular diastolic function is consistent with (grade II w/high LAP) pseudonormalization.    Mild aortic valve regurgitation is present.    Trace to mild mitral valve regurgitation is present.    Trace tricuspid valve regurgitation is present.    Saline test results are negative for intracardiac shunting    No cardiac source for embolus is seen      Plan for Follow-up of Pending Labs/Results: N/A    Discharge Details        Discharge Medications        New Medications        Instructions Start Date   atorvastatin 80 MG tablet  Commonly known as: LIPITOR   80 mg, Oral, Nightly      losartan 25 MG tablet  Commonly known as: COZAAR   25 mg, Oral, Every 24 Hours Scheduled   Start Date: December 16, 2024            Continue These Medications        Instructions Start Date   apixaban 5 MG tablet tablet  Commonly known as: ELIQUIS   5 mg, Oral, Every 12 Hours Scheduled, Take a dose of 2.5 mg every 12 hours till 6/16/21 evening dose, then resume 5 mg every 12 hours.      cyclobenzaprine 10 MG tablet  Commonly known as: FLEXERIL   10 mg, Nightly PRN      Diclofenac Sodium 1 % gel gel  Commonly known as: VOLTAREN   APPLY TOPICALLY TO THE AFFECTED AREA FOUR TIMES DAILY AS DIRECTED      DULoxetine 30 MG capsule  Commonly known as: CYMBALTA   1 capsule, Daily      flecainide 50 MG tablet  Commonly known as: TAMBOCOR   50 mg, Oral, Every 12 Hours Scheduled      fluticasone 50 MCG/ACT nasal spray  Commonly known as: FLONASE   2 sprays, Daily      furosemide 40 MG tablet  Commonly known as: LASIX   40 mg, Daily PRN      HYDROcodone-acetaminophen 7.5-325 MG per tablet  Commonly known as: NORCO   1 tablet, Every 6 Hours PRN      levothyroxine 50 MCG tablet  Commonly known as: SYNTHROID, LEVOTHROID   50 mcg, Daily      metoprolol tartrate 50 MG tablet  Commonly known  as: LOPRESSOR   TAKE 1 TABLET BY MOUTH TWICE A DAY      potassium chloride 20 MEQ CR tablet  Commonly known as: KLOR-CON M20   20 mEq, As Needed      traZODone 100 MG tablet  Commonly known as: DESYREL   100 mg, Daily      Xiidra 5 % ophthalmic solution  Generic drug: Lifitegrast                Allergies   Allergen Reactions    Accupril [Quinapril Hcl] Cough    Cephalexin Itching    Ciprofloxacin Itching    Triamcinolone Other (See Comments)     Burning sensation         Discharge Disposition:  Home or Self Care    Diet:  Hospital:  Diet Order   Procedures    Diet: Regular/House; Fluid Consistency: Thin (IDDSI 0)       Diet Instructions       Diet: Cardiac Diets; Healthy Heart (2-3 Na+); Regular (IDDSI 7); Thin (IDDSI 0)      Discharge Diet: Cardiac Diets    Cardiac Diet: Healthy Heart (2-3 Na+)    Texture: Regular (IDDSI 7)    Fluid Consistency: Thin (IDDSI 0)             Activity:  Activity Instructions       Activity as Tolerated              Restrictions or Other Recommendations:  As tolerated        CODE STATUS:    Code Status and Medical Interventions: CPR (Attempt to Resuscitate); Full Support   Ordered at: 12/13/24 0113     Code Status (Patient has no pulse and is not breathing):    CPR (Attempt to Resuscitate)     Medical Interventions (Patient has pulse or is breathing):    Full Support       Future Appointments   Date Time Provider Department Center   3/10/2025  1:30 PM Silas Ravi MD [facility]       Additional Instructions for the Follow-ups that You Need to Schedule       Discharge Follow-up with PCP   As directed       Currently Documented PCP:    Everardo Mccormack MD    PCP Phone Number:    [phone]     Follow Up Details: 1 week        Discharge Follow-up with Specialty: Cardiology as scheduled.   As directed      Specialty: Cardiology as scheduled.                      Deon Alcazar DO  12/15/24      Time Spent on Discharge:  I spent  38  minutes on this discharge activity which included:  face-to-face encounter with the patient, reviewing the data in the system, coordination of the care with the nursing staff as well as consultants, documentation, and entering orders.

## 2024-12-15 NOTE — PLAN OF CARE
Problem: Adult Inpatient Plan of Care  Goal: Plan of Care Review  Outcome: Progressing  Goal: Patient-Specific Goal (Individualized)  Outcome: Progressing  Goal: Absence of Hospital-Acquired Illness or Injury  Outcome: Progressing  Intervention: Identify and Manage Fall Risk  Intervention: Prevent Skin Injury  Intervention: Prevent Infection  Goal: Optimal Comfort and Wellbeing  Outcome: Progressing  Intervention: Provide Person-Centered Care  Goal: Readiness for Transition of Care  Outcome: Progressing     Problem: Skin Injury Risk Increased  Goal: Skin Health and Integrity  Outcome: Progressing  Intervention: Optimize Skin Protection     Problem: Comorbidity Management  Goal: Blood Glucose Level Within Target Range  Outcome: Progressing  Intervention: Monitor and Manage Glycemia  Goal: Blood Pressure in Desired Range  Outcome: Progressing  Intervention: Maintain Blood Pressure Management   Goal Outcome Evaluation:  Plan of Care Reviewed With: patient

## 2024-12-16 NOTE — MM_ITS
PROCEDURE INFORMATION: [Disease: _____________________] : Disease: [unfilled] [T: ___] : T[unfilled] [N: ___] : N[unfilled] [M: ___] : M[unfilled] [AJCC Stage: ____] : AJCC Stage: [unfilled] [de-identified] : 86 yo M with a PMH DM2 and HTN who presents as an initial consultation for ampullary adenocarcinoma.  He had been feeling well until late December, when his PCP noticed abnormal LFTs. He had a CT and was found to have biliary ductal dilation with abrupt tapering at the distal CBD. He sent him to GI, who performed an EGD/ERCP on 1/27/22, which showed a protuberant major papilla with a mass-like appearance. He had a sphincterotomy, biliary stent, and bile brush bx (showing atypical cells) and bile duct biopsy (suspicious for adenocarcinoma). He then was sent to see surgery, who performed a Whipple on 3/10/22. Biopsy showed ampullary adenocarcinoma.  He has since recovered from his surgery, denying abdominal pain, N/V, diarrhea, constipation, or blood in his stool. He has a slight sour taste in his mouth and has lost 5 lb since the surgery. He says prior to his surgery, he was feeling well, denying any symptoms except for a 5 lb weight loss.  4/5/22: CT CAP: likely post op fluid surrounding SMV, no mets 4/25-7/5/22: C1-C6 Gemzar 7/9/22: CT CAP: no interval change compared to 4/5/22 scans 7/18-9/26/22: C7-C12 Gemzar. 9/27/23: CT CAP: No change from 4/5/23 scans. Follow b/l pulmonary nodules 12/16/23: CT CAP. Imaging unchanged, stable. 5/8/23: CT CAP. Stable exam. No evidence of recurrent or metastatic disease. 12/1/23: CT CAP: CHOCO 6/5/24: CT CAP:  CHOCO 12/10/24: CT CAP: CHOCO [de-identified] : adenocarcinoma, pancreaticobiliary  [FreeTextEntry1] : surveillance [de-identified] : here for f/u. feels well. getting labs with PCP this week. recent scans neg for cancer occasional heart burn

## 2025-02-05 ENCOUNTER — NURSE TRIAGE (OUTPATIENT)
Dept: CALL CENTER | Facility: HOSPITAL | Age: 75
End: 2025-02-05
Payer: MEDICARE

## 2025-02-05 NOTE — TELEPHONE ENCOUNTER
"Called to see if cholesterol med prescibed a month ago inpatient at hospital was to be continued. Advised to contact PCP for further guidance and refill, if needed. Verbalized understanding.     Reason for Disposition   [1] Caller requesting NON-URGENT health information AND [2] PCP's office is the best resource    Additional Information   Negative: [1] Caller is not with the adult (patient) AND [2] reporting urgent symptoms   Negative: Lab result questions   Negative: Medication questions   Negative: Caller can't be reached by phone   Negative: Caller has already spoken to PCP or another triager   Negative: RN needs further essential information from caller in order to complete triage   Negative: Requesting regular office appointment   Negative: Health Information question, no triage required and triager able to answer question   Negative: General information question, no triage required and triager able to answer question   Negative: Question about upcoming scheduled test, no triage required and triager able to answer question   Negative: [1] Caller is not with the adult (patient) AND [2] probable NON-URGENT symptoms   Negative: [1] Follow-up call to recent contact AND [2] information only call, no triage required    Answer Assessment - Initial Assessment Questions  1. REASON FOR CALL or QUESTION: \"What is your reason for calling today?\" or \"How can I best help you?\" or \"What question do you have that I can help answer?\"      Called to see if cholesterol med prescibed a month ago inpatient at hospital was to be continued. Advised to contact PCP for further guidance and refill, if needed. Verbalized understanding.    Protocols used: Information Only Call - No Triage-ADULT-    "

## 2025-02-07 ENCOUNTER — HOSPITAL ENCOUNTER (OUTPATIENT)
Dept: HOSPITAL 22 - RAD | Age: 75
Discharge: HOME | End: 2025-02-07
Payer: MEDICARE

## 2025-02-07 DIAGNOSIS — Z12.31: Primary | ICD-10-CM

## 2025-02-07 PROCEDURE — 77063 BREAST TOMOSYNTHESIS BI: CPT

## 2025-02-07 PROCEDURE — 77067 SCR MAMMO BI INCL CAD: CPT

## 2025-02-07 NOTE — MM_ITS
PROCEDURE INFORMATION:  
Exam: MG Bilateral Screening 3D Mammography  
Exam date and time: 2/7/2025 2:38 PM  
Age: 74 years old  
Clinical indication: Screening examination  
 
TECHNIQUE:  
Imaging protocol: Bilateral Screening tomosynthesis and 2D  
mammography  
including computer-aided detection (CAD) when performed.  
 
COMPARISON:  
1.   MG MM DIG SCREENING MAMM BI W/CAD 11/29/2023 1:08 PM  
2.   MG MM DIG SCREENING MAMM BI W/CAD 10/6/2021 10:51 AM  
 
FINDINGS:  
 
MAMMOGRAPHY:  
Breast composition: There are scattered areas of fibroglandular  
density.  
Mass: None.  
Architectural distortion: None.  
Calcifications: No suspicious calcifications.  
Asymmetric density: None.  
Skin thickening: None.  
Axillary adenopathy: None.  
 
IMPRESSION:  
No mammographic evidence of malignancy. Annual screening is  
recommended unless  
otherwise clinically indicated.  
 
ASSESSMENT:  
BI-RADS Category 1: Negative.  
 
Electronically signed by Mishel Fernandez MD at 02/11/2025 06:28

## 2025-02-26 ENCOUNTER — HOSPITAL ENCOUNTER (OUTPATIENT)
Dept: CARDIOLOGY | Facility: HOSPITAL | Age: 75
Discharge: HOME | End: 2025-02-26
Attending: INTERNAL MEDICINE | Admitting: INTERNAL MEDICINE
Payer: MEDICARE

## 2025-02-26 VITALS
TEMPERATURE: 97.2 F | DIASTOLIC BLOOD PRESSURE: 56 MMHG | OXYGEN SATURATION: 93 % | HEART RATE: 54 BPM | RESPIRATION RATE: 14 BRPM | SYSTOLIC BLOOD PRESSURE: 123 MMHG

## 2025-02-26 DIAGNOSIS — I48.91 ATRIAL FIBRILLATION, UNSPECIFIED TYPE: ICD-10-CM

## 2025-02-26 LAB
QT INTERVAL: 488 MS
QTC INTERVAL: 466 MS

## 2025-02-26 PROCEDURE — 93005 ELECTROCARDIOGRAM TRACING: CPT | Performed by: PHYSICIAN ASSISTANT

## 2025-02-26 PROCEDURE — 92960 CARDIOVERSION ELECTRIC EXT: CPT

## 2025-02-26 RX ORDER — CHOLECALCIFEROL (VITAMIN D3) 25 MCG
2000 TABLET ORAL DAILY
COMMUNITY

## 2025-02-26 RX ORDER — FLECAINIDE ACETATE 100 MG/1
100 TABLET ORAL 2 TIMES DAILY
COMMUNITY

## 2025-02-26 RX ORDER — ATORVASTATIN CALCIUM 80 MG/1
80 TABLET, FILM COATED ORAL DAILY
COMMUNITY

## 2025-02-26 RX ORDER — FLECAINIDE ACETATE 50 MG/1
100 TABLET ORAL EVERY 12 HOURS SCHEDULED
Qty: 60 TABLET | Refills: 6 | Status: SHIPPED | OUTPATIENT
Start: 2025-02-26
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
## 2025-03-10 ENCOUNTER — OFFICE VISIT (OUTPATIENT)
Dept: ORTHOPEDIC SURGERY | Facility: CLINIC | Age: 75
End: 2025-03-10
Payer: MEDICARE

## 2025-03-10 VITALS
DIASTOLIC BLOOD PRESSURE: 64 MMHG | BODY MASS INDEX: 31.62 KG/M2 | HEIGHT: 64 IN | WEIGHT: 185.2 LBS | SYSTOLIC BLOOD PRESSURE: 138 MMHG

## 2025-03-10 DIAGNOSIS — M16.12 PRIMARY OSTEOARTHRITIS OF LEFT HIP: Primary | ICD-10-CM

## 2025-03-10 PROCEDURE — 1160F RVW MEDS BY RX/DR IN RCRD: CPT | Performed by: ORTHOPAEDIC SURGERY

## 2025-03-10 PROCEDURE — 3075F SYST BP GE 130 - 139MM HG: CPT | Performed by: ORTHOPAEDIC SURGERY

## 2025-03-10 PROCEDURE — 99214 OFFICE O/P EST MOD 30 MIN: CPT | Performed by: ORTHOPAEDIC SURGERY

## 2025-03-10 PROCEDURE — 3078F DIAST BP <80 MM HG: CPT | Performed by: ORTHOPAEDIC SURGERY

## 2025-03-10 PROCEDURE — 1159F MED LIST DOCD IN RCRD: CPT | Performed by: ORTHOPAEDIC SURGERY

## 2025-03-10 NOTE — PROGRESS NOTES
[facility] Note    Subjective     Chief Complaint   Patient presents with    Follow-up     6 month follow up -- Primary osteoarthritis of left hip        HPI    It has been 6  month(s) since Ms. Herrera's last visit. She returns to clinic today for follow-up of left hip pain. The issue has been ongoing for 4 year(s). She rates her pain a 5/10 on the pain scale. Previous/current treatments: cane/walker. Current symptoms: pain. The pain is worse with walking, standing, sitting, climbing stairs, working, lying on affected side, rising from seated position, and any movement of the joint; assistive device (cane/walker) improve the pain. Overall, she is doing worse.  She wanted to have her hip checked out.  She is getting ready to move to [place].      I have reviewed the following portions of the patient's history and agree with: History of Present Illness and Review of Systems    Patient Active Problem List   Diagnosis    Persistent atrial fibrillation    Hypertension    Fibromyalgia    Nephrolithiasis    History of surgery    Primary osteoarthritis of right hip    Status post total replacement of right hip    Chronic anticoagulation    Elevated hemoglobin A1c    Postoperative pain    Anemia     Past Medical History:   Diagnosis Date    Arthritis     Back pain     Bilateral kidney stones     Chronic fatigue     Chronic pain     Dental bridge present     Fibromyalgia     Hyperlipidemia     Hypertension     Hypothyroidism     Insomnia     Muscle weakness (generalized)     Palpitations     Persistent atrial fibrillation 01/23/2017    A) Diagnosed 2016 by PCP when presenting with extreme fatigue B) CHADS-VASc = 3 (age, female, HTN)  C) 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 Changed to flecainide. F) ECV 11/29/16 on flecainide. Recurrence of afib about  24hrs later. F) TTE 10/20/16 bin    PONV (postoperative nausea and vomiting)     Sciatica of right side     SOB (shortness of breath)     Urination frequency     Wears eyeglasses     reading      Past Surgical History:   Procedure Laterality Date    CARDIAC CATHETERIZATION      NO STENTS    CARDIAC ELECTROPHYSIOLOGY PROCEDURE N/A 02/22/2017    Procedure: PVA. DNS meds.;  Surgeon: Miguelito Owusu DO;  Location:  [facility] EP INVASIVE LOCATION;  Service:     CARDIOVERSION      X 2    CARPAL TUNNEL RELEASE Bilateral     COLONOSCOPY      2013    ENDOSCOPY  05/2022    GALLBLADDER SURGERY      GASTRIC BYPASS      REPLACEMENT TOTAL KNEE BILATERAL Bilateral     SINUS SURGERY      X 2    TOTAL HIP ARTHROPLASTY Right 06/10/2021    Procedure: TOTAL HIP ARTHROPLASTY ANTERIOR MODIFIED RIGHT;  Surgeon: Silas Ravi MD;  Location:  [facility] OR;  Service: Orthopedics;  Laterality: Right;    UTERINE SUSPENSION LAPAROSCOPIC        Family History   Problem Relation Age of Onset    Heart attack Mother     Heart attack Father     Heart disease Brother     Diabetes Brother     Kidney disease Brother      Social History     Socioeconomic History    Marital status:    Tobacco Use    Smoking status: Never    Smokeless tobacco: Never   Vaping Use    Vaping status: Never Used   Substance and Sexual Activity    Alcohol use: No    Drug use: No    Sexual activity: Defer      Current Outpatient Medications on File Prior to Visit   Medication Sig Dispense Refill    apixaban (ELIQUIS) 5 MG tablet tablet Take 1 tablet by mouth Every 12 (Twelve) Hours. Take a dose of 2.5 mg every 12 hours till 6/16/21 evening dose, then resume 5 mg every 12 hours. 60 tablet     atorvastatin (LIPITOR) 80 MG tablet Take 1 tablet by mouth Daily.      Cholecalciferol 25 MCG (1000 UT) tablet Take 2 tablets by mouth Daily.      cyclobenzaprine (FLEXERIL) 10 MG tablet Take 1 tablet by mouth At Night As Needed for Muscle Spasms.      Diclofenac Sodium (VOLTAREN) 1 % gel  gel APPLY TOPICALLY TO THE AFFECTED AREA FOUR TIMES DAILY AS DIRECTED      DULoxetine (CYMBALTA) 30 MG capsule Take 1 capsule by mouth Daily.      flecainide (TAMBOCOR) 100 MG tablet Take 1 tablet by mouth 2 (Two) Times a Day.      flecainide (TAMBOCOR) 50 MG tablet Take 2 tablets by mouth Every 12 (Twelve) Hours. 60 tablet 6    fluticasone (FLONASE) 50 MCG/ACT nasal spray 2 sprays by Each Nare route Daily.      furosemide (LASIX) 40 MG tablet Take 1 tablet by mouth Daily As Needed (swelling).      HYDROcodone-acetaminophen (NORCO) 7.5-325 MG per tablet Take 1 tablet by mouth Every 6 (Six) Hours As Needed for Moderate Pain.      levothyroxine (SYNTHROID, LEVOTHROID) 50 MCG tablet Take 1 tablet by mouth Daily.      metoprolol tartrate (LOPRESSOR) 50 MG tablet TAKE 1 TABLET BY MOUTH TWICE A DAY (Patient taking differently: Take 2 tablets by mouth 2 (Two) Times a Day.) 180 tablet 0    multivitamin with minerals (PRESERVISION AREDS PO) Take 1 tablet by mouth Daily.      potassium chloride (K-DUR,KLOR-CON) 20 MEQ CR tablet Take 1 tablet by mouth As Needed (with lasix).      traZODone (DESYREL) 100 MG tablet Take 1 tablet by mouth Daily.      Xiidra 5 % ophthalmic solution       losartan (COZAAR) 25 MG tablet Take 1 tablet by mouth Daily for 30 days. 30 tablet 0     No current facility-administered medications on file prior to visit.      Allergies   Allergen Reactions    Accupril [Quinapril Hcl] Cough    Cephalexin Itching    Ciprofloxacin Itching    Triamcinolone Other (See Comments)     Burning sensation        Review of Systems   Constitutional:  Negative for activity change, appetite change, chills, diaphoresis, fatigue, fever and unexpected weight change.   HENT:  Negative for congestion, dental problem, drooling, ear discharge, ear pain, facial swelling, hearing loss, mouth sores, nosebleeds, postnasal drip, rhinorrhea, sinus pressure, sneezing, sore throat, tinnitus, trouble swallowing and voice change.    Eyes:   "Negative for photophobia, pain, discharge, redness, itching and visual disturbance.   Respiratory:  Negative for apnea, cough, choking, chest tightness, shortness of breath, wheezing and stridor.    Cardiovascular:  Negative for chest pain, palpitations and leg swelling.   Gastrointestinal:  Negative for abdominal distention, abdominal pain, anal bleeding, blood in stool, constipation, diarrhea, nausea, rectal pain and vomiting.   Endocrine: Negative for cold intolerance, heat intolerance, polydipsia, polyphagia and polyuria.   Genitourinary:  Negative for decreased urine volume, difficulty urinating, dysuria, enuresis, flank pain, frequency, genital sores, hematuria and urgency.   Musculoskeletal:  Positive for arthralgias. Negative for back pain, gait problem, joint swelling, myalgias, neck pain and neck stiffness.   Skin:  Negative for color change, pallor, rash and wound.   Allergic/Immunologic: Negative for environmental allergies, food allergies and immunocompromised state.   Neurological:  Negative for dizziness, tremors, seizures, syncope, facial asymmetry, speech difficulty, weakness, light-headedness, numbness and headaches.   Hematological:  Negative for adenopathy. Does not bruise/bleed easily.   Psychiatric/Behavioral:  Negative for agitation, behavioral problems, confusion, decreased concentration, dysphoric mood, hallucinations, self-injury, sleep disturbance and suicidal ideas. The patient is not nervous/anxious and is not hyperactive.         Objective      Physical Exam  /64   Ht 162.6 cm (64\")   Wt 84 kg (185 lb 3.2 oz)   LMP  (LMP Unknown)   BMI 31.79 kg/m²     Body mass index is 31.79 kg/m².         General:   Mental Status:  Alert   Appearance: Cooperative, in no acute distress   Build and Nutrition: Obese by BMI female   Orientation: Alert and oriented to person, place and time   Posture: Normal   Gait: Mild limp on the left with a cane    Integument:   Left hip: No skin lesions, no " rash, no ecchymosis    Lower Extremity:   Left Hip:    Tenderness:  None    Swelling:  None    Crepitus:  None    Range of motion:  External Rotation: 30°       Internal Rotation: 20°, with pain       Flexion:  100°       Extension:  0°    Deformities:  None  Functional testing: Mildly positive Stinchfield    No leg length discrepancy      Imaging/Studies  Imaging Results (Last 24 Hours)       Procedure Component Value Units Date/Time    XR Hip With or Without Pelvis 2 - 3 View Left [[id number]] Resulted: 03/10/25 1301     Updated: 03/10/25 1301    Narrative:      Left Hip Radiographs  Indication: left hip pain  Views: low AP pelvis and lateral of the left hip    Comparison: 6/5/2024    Findings:   Advanced arthritis, with bone-on-bone contact, osteophytes at the head   neck junction, acetabular osteophytes, vascular calcifications.  No   unusual bony features.  Worsening arthritis compared to the previous   imaging.                Assessment and Plan     Diagnoses and all orders for this visit:    1. Primary osteoarthritis of left hip (Primary)  -     XR Hip With or Without Pelvis 2 - 3 View Left        1. Primary osteoarthritis of left hip          I reviewed my findings with the patient.  She has advanced left hip arthritis, and is a candidate for hip replacement surgery.  She is getting ready to move to [place], and she is going to seek care in [place].    Return if symptoms worsen or fail to improve.      Silas Ravi MD  03/10/25  13:21 EDT    Dictated Utilizing Dragon Dictation

## (undated) DEVICE — Device: Brand: MEDEX

## (undated) DEVICE — ADULT, W/LG. BACK PAD, RADIOTRANSPARENT ELEMENT AND LEAD WIRE: Brand: DEFIBRILLATION ELECTRODES

## (undated) DEVICE — Device: Brand: THERMOCOOL SMARTTOUCH

## (undated) DEVICE — BLANKT WARM UPPR/BDY ARM/OUT 57X196CM

## (undated) DEVICE — Device: Brand: REFERENCE PATCH CARTO 3

## (undated) DEVICE — DOME MONITORING W BONDED STPCK BIOTRANS2

## (undated) DEVICE — INTRO SWARTZ TRNSEP LAMP90 8.5F 63CM

## (undated) DEVICE — INTRO SHEATH ENGAGE W/50 GW .038 7F12

## (undated) DEVICE — CONTRL CONTRST CHMBRD W/TBG72IN REUS

## (undated) DEVICE — DRAPE,T,LIMB,BILATERAL,STERILE: Brand: MEDLINE

## (undated) DEVICE — TRY EPID SFTY 18G 3.5IN 1T7680

## (undated) DEVICE — INTRO SHEATH FAST/CATH STD 8.5F .038IN 12CM

## (undated) DEVICE — PENCL E/S HNDSWCH ROCKRBTN HOLSTR 10FT

## (undated) DEVICE — SYS CLS SKIN PREMIERPRO EXOFINFUSION 22CM

## (undated) DEVICE — Device: Brand: PENTARAY NAV

## (undated) DEVICE — CATH SHEATH GUIDE SWARTZ 6FR

## (undated) DEVICE — INTRO SHEATH 8F60CM

## (undated) DEVICE — Device: Brand: SOUNDSTAR

## (undated) DEVICE — ST INF PRI SMRTSTE 20DRP 2VLV 24ML 117

## (undated) DEVICE — C-ARM DRAPE: Brand: DEROYAL

## (undated) DEVICE — ST EXT IV LG BORE NDLESS FLTR LL 17IN

## (undated) DEVICE — DECANTER: Brand: UNBRANDED

## (undated) DEVICE — STRYKER PERFORMANCE SERIES SAGITTAL BLADE: Brand: STRYKER PERFORMANCE SERIES

## (undated) DEVICE — STCKNT IMPERV 12IN STRL

## (undated) DEVICE — PATIENT RETURN ELECTRODE, SINGLE-USE, CONTACT QUALITY MONITORING, ADULT, WITH 9FT CORD, FOR PATIENTS WEIGING OVER 33LBS. (15KG): Brand: MEGADYNE

## (undated) DEVICE — ANTIBACTERIAL UNDYED BRAIDED (POLYGLACTIN 910), SYNTHETIC ABSORBABLE SUTURE: Brand: COATED VICRYL

## (undated) DEVICE — DRSNG SURESITE123 4X4.8IN

## (undated) DEVICE — KT MANIFLD EP

## (undated) DEVICE — BNDG ELAS CO-FLEX SLF ADHR 4IN5YD LF STRL

## (undated) DEVICE — ELECTRD BLD EZ CLN STD 6.5IN

## (undated) DEVICE — SPNG GZ WOVN 4X4IN 12PLY 10/BX STRL

## (undated) DEVICE — INTRO SHEATH ENGAGE W/50 GW .038 9F12

## (undated) DEVICE — BNDG ELAS CO-FLEX SLF ADHR 6IN 5YD LF STRL

## (undated) DEVICE — Device: Brand: WEBSTER CS

## (undated) DEVICE — ELECTRD BLD EZ CLN STD 2.5IN

## (undated) DEVICE — SYS TRNSEP ACC BRK ACROSS A/ 71CM

## (undated) DEVICE — GW DIAG .032

## (undated) DEVICE — PULLOVER TOGA, 2X LARGE: Brand: FLYTE, SURGICOOL

## (undated) DEVICE — ST EXT IV SMARTSITE 2VLV SP M LL 5ML IV1

## (undated) DEVICE — DECANT BG O JET

## (undated) DEVICE — SET PRIMARY GRVTY 10DP MALE LL 104IN

## (undated) DEVICE — GLV SURG PREMIERPRO MIC LTX PF SZ8.5 BRN

## (undated) DEVICE — DRAPE,REIN 53X77,STERILE: Brand: MEDLINE

## (undated) DEVICE — PK HIP TOTL UNIV 10

## (undated) DEVICE — Device: Brand: SMARTABLATE

## (undated) DEVICE — GLV SURG SENSICARE W/ALOE PF LF 9 STRL

## (undated) DEVICE — INTRO SHEATH FAST/CATH LG/LUM 11F .038IN 12CM

## (undated) DEVICE — DRAPE,TOP,102X53,STERILE: Brand: MEDLINE

## (undated) DEVICE — PENCL ROCKRSWCH MEGADYNE W/HOLSTR 10FT SS

## (undated) DEVICE — C-ARM: Brand: UNBRANDED

## (undated) DEVICE — SOL NACL 0.9PCT 1000ML

## (undated) DEVICE — 3M™ MEDIPORE™ H SOFT CLOTH SURGICAL TAPE, 2863, 3 IN X 10 YD, 12/CASE: Brand: 3M™ MEDIPORE™

## (undated) DEVICE — TOWEL,OR,DSP,ST,BLUE,STD,4/PK,20PK/CS: Brand: MEDLINE

## (undated) DEVICE — LEX ELECTRO PHYSIOLOGY: Brand: MEDLINE INDUSTRIES, INC.

## (undated) DEVICE — PRESSURE MONITORING SET: Brand: TRUWAVE